# Patient Record
Sex: FEMALE | Race: WHITE | URBAN - METROPOLITAN AREA
[De-identification: names, ages, dates, MRNs, and addresses within clinical notes are randomized per-mention and may not be internally consistent; named-entity substitution may affect disease eponyms.]

---

## 2017-01-09 ENCOUNTER — FOLLOW UP ESTABLISHED (OUTPATIENT)
Dept: URBAN - METROPOLITAN AREA CLINIC 10 | Facility: CLINIC | Age: 82
End: 2017-01-09
Payer: MEDICARE

## 2017-01-09 PROCEDURE — 92012 INTRM OPH EXAM EST PATIENT: CPT | Performed by: OPHTHALMOLOGY

## 2017-01-09 RX ORDER — DORZOLAMIDE HYDROCHLORIDE AND TIMOLOL MALEATE 20; 5 MG/ML; MG/ML
SOLUTION OPHTHALMIC
Qty: 3 | Refills: 1 | Status: INACTIVE
Start: 2017-01-09 | End: 2017-09-25

## 2017-01-09 RX ORDER — LATANOPROST 50 UG/ML
0.005 % SOLUTION OPHTHALMIC
Qty: 3 | Refills: 1 | Status: INACTIVE
Start: 2017-01-09 | End: 2017-09-25

## 2017-01-09 ASSESSMENT — INTRAOCULAR PRESSURE
OS: 12
OD: 12

## 2017-05-08 ENCOUNTER — FOLLOW UP ESTABLISHED (OUTPATIENT)
Dept: URBAN - METROPOLITAN AREA CLINIC 10 | Facility: CLINIC | Age: 82
End: 2017-05-08
Payer: MEDICARE

## 2017-05-08 PROCEDURE — 92083 EXTENDED VISUAL FIELD XM: CPT | Performed by: OPHTHALMOLOGY

## 2017-05-08 PROCEDURE — 92012 INTRM OPH EXAM EST PATIENT: CPT | Performed by: OPHTHALMOLOGY

## 2017-05-08 ASSESSMENT — INTRAOCULAR PRESSURE
OD: 12
OS: 12

## 2017-08-21 PROBLEM — H40.9 GLAUCOMA, UNSPECIFIED GLAUCOMA TYPE, UNSPECIFIED LATERALITY: Status: ACTIVE | Noted: 2017-08-21

## 2017-09-25 ENCOUNTER — FOLLOW UP ESTABLISHED (OUTPATIENT)
Dept: URBAN - METROPOLITAN AREA CLINIC 10 | Facility: CLINIC | Age: 82
End: 2017-09-25
Payer: MEDICARE

## 2017-09-25 PROCEDURE — 92012 INTRM OPH EXAM EST PATIENT: CPT | Performed by: OPHTHALMOLOGY

## 2017-09-25 PROCEDURE — 92133 CPTRZD OPH DX IMG PST SGM ON: CPT | Performed by: OPHTHALMOLOGY

## 2017-09-25 RX ORDER — LATANOPROST 50 UG/ML
0.005 % SOLUTION OPHTHALMIC
Qty: 3 | Refills: 1 | Status: INACTIVE
Start: 2017-09-25 | End: 2018-08-22

## 2017-09-25 RX ORDER — DORZOLAMIDE HYDROCHLORIDE AND TIMOLOL MALEATE 20; 5 MG/ML; MG/ML
SOLUTION/ DROPS OPHTHALMIC
Qty: 3 | Refills: 1 | Status: INACTIVE
Start: 2017-09-25 | End: 2018-03-15

## 2017-09-25 ASSESSMENT — INTRAOCULAR PRESSURE
OD: 13
OS: 12

## 2018-03-02 ENCOUNTER — FOLLOW UP ESTABLISHED (OUTPATIENT)
Dept: URBAN - METROPOLITAN AREA CLINIC 10 | Facility: CLINIC | Age: 83
End: 2018-03-02
Payer: MEDICARE

## 2018-03-02 DIAGNOSIS — H40.1133 PRIMARY OPEN-ANGLE GLAUCOMA, BILATERAL, SEVERE STAGE: Primary | ICD-10-CM

## 2018-03-02 PROCEDURE — 92250 FUNDUS PHOTOGRAPHY W/I&R: CPT | Performed by: OPTOMETRIST

## 2018-03-02 PROCEDURE — 92014 COMPRE OPH EXAM EST PT 1/>: CPT | Performed by: OPTOMETRIST

## 2018-03-02 RX ORDER — BRINZOLAMIDE 10 MG/ML
1 % SUSPENSION/ DROPS OPHTHALMIC
Qty: 1 | Refills: 3 | Status: INACTIVE
Start: 2018-03-02 | End: 2018-03-09

## 2018-03-02 RX ORDER — TIMOLOL MALEATE 5 MG/ML
0.5 % SOLUTION/ DROPS OPHTHALMIC
Qty: 1 | Refills: 3 | Status: INACTIVE
Start: 2018-03-02 | End: 2019-05-09

## 2018-03-02 ASSESSMENT — INTRAOCULAR PRESSURE
OD: 13
OS: 13

## 2018-03-02 ASSESSMENT — VISUAL ACUITY
OD: 20/20
OS: 20/25

## 2019-05-09 ENCOUNTER — FOLLOW UP ESTABLISHED (OUTPATIENT)
Dept: URBAN - METROPOLITAN AREA CLINIC 10 | Facility: CLINIC | Age: 84
End: 2019-05-09
Payer: MEDICARE

## 2019-05-09 DIAGNOSIS — H31.091 CHORIORETINAL SCARS, RIGHT EYE: ICD-10-CM

## 2019-05-09 PROCEDURE — 92083 EXTENDED VISUAL FIELD XM: CPT | Performed by: OPTOMETRIST

## 2019-05-09 PROCEDURE — 92133 CPTRZD OPH DX IMG PST SGM ON: CPT | Performed by: OPTOMETRIST

## 2019-05-09 PROCEDURE — 92014 COMPRE OPH EXAM EST PT 1/>: CPT | Performed by: OPTOMETRIST

## 2019-05-09 PROCEDURE — 92015 DETERMINE REFRACTIVE STATE: CPT | Performed by: OPTOMETRIST

## 2019-05-09 RX ORDER — LATANOPROST 50 UG/ML
0.005 % SOLUTION OPHTHALMIC
Qty: 3 | Refills: 4 | Status: ACTIVE
Start: 2019-05-09

## 2019-05-09 RX ORDER — DORZOLAMIDE HYDROCHLORIDE AND TIMOLOL MALEATE 20; 5 MG/ML; MG/ML
SOLUTION/ DROPS OPHTHALMIC
Qty: 3 | Refills: 4 | Status: ACTIVE
Start: 2019-05-09

## 2019-05-09 RX ORDER — LATANOPROST 50 UG/ML
0.005 % SOLUTION OPHTHALMIC
Qty: 3 | Refills: 4 | Status: INACTIVE
Start: 2019-05-09 | End: 2019-05-09

## 2019-05-09 ASSESSMENT — KERATOMETRY
OD: 41.63
OS: 42.00

## 2019-05-09 ASSESSMENT — INTRAOCULAR PRESSURE
OS: 12
OD: 11

## 2019-05-09 ASSESSMENT — VISUAL ACUITY
OD: 20/20
OS: 20/25

## 2024-03-27 ENCOUNTER — ANESTHESIA EVENT (OUTPATIENT)
Dept: EMERGENCY DEPT | Facility: HOSPITAL | Age: 89
End: 2024-03-27
Payer: MEDICARE

## 2024-03-27 ENCOUNTER — APPOINTMENT (OUTPATIENT)
Dept: GENERAL RADIOLOGY | Facility: HOSPITAL | Age: 89
End: 2024-03-27
Attending: EMERGENCY MEDICINE
Payer: MEDICARE

## 2024-03-27 ENCOUNTER — ANESTHESIA (OUTPATIENT)
Dept: SURGERY | Facility: HOSPITAL | Age: 89
End: 2024-03-27
Payer: MEDICARE

## 2024-03-27 ENCOUNTER — ANESTHESIA EVENT (OUTPATIENT)
Dept: SURGERY | Facility: HOSPITAL | Age: 89
End: 2024-03-27
Payer: MEDICARE

## 2024-03-27 ENCOUNTER — ANESTHESIA (OUTPATIENT)
Dept: EMERGENCY DEPT | Facility: HOSPITAL | Age: 89
End: 2024-03-27
Payer: MEDICARE

## 2024-03-27 ENCOUNTER — APPOINTMENT (OUTPATIENT)
Dept: GENERAL RADIOLOGY | Facility: HOSPITAL | Age: 89
End: 2024-03-27
Attending: ORTHOPAEDIC SURGERY
Payer: MEDICARE

## 2024-03-27 ENCOUNTER — HOSPITAL ENCOUNTER (INPATIENT)
Facility: HOSPITAL | Age: 89
LOS: 8 days | Discharge: NURSING, REHAB OR LONG-TERM CARE | End: 2024-04-04
Attending: EMERGENCY MEDICINE | Admitting: STUDENT IN AN ORGANIZED HEALTH CARE EDUCATION/TRAINING PROGRAM
Payer: MEDICARE

## 2024-03-27 ENCOUNTER — APPOINTMENT (OUTPATIENT)
Dept: CT IMAGING | Facility: HOSPITAL | Age: 89
End: 2024-03-27
Attending: EMERGENCY MEDICINE
Payer: MEDICARE
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
## 2024-03-27 DIAGNOSIS — S72.141A CLOSED DISPLACED INTERTROCHANTERIC FRACTURE OF RIGHT FEMUR, INITIAL ENCOUNTER (HCC): Primary | ICD-10-CM

## 2024-03-27 PROBLEM — F03.90 DEMENTIA (HCC): Status: ACTIVE | Noted: 2024-03-27

## 2024-03-27 PROBLEM — I95.1 ORTHOSTATIC HYPOTENSION: Status: ACTIVE | Noted: 2024-03-27

## 2024-03-27 PROBLEM — S72.009A HIP FRACTURE (HCC): Status: ACTIVE | Noted: 2024-03-27

## 2024-03-27 PROBLEM — F32.A DEPRESSION: Status: ACTIVE | Noted: 2024-03-27

## 2024-03-27 LAB
ALBUMIN SERPL-MCNC: 3.2 G/DL (ref 3.4–5)
ALBUMIN/GLOB SERPL: 0.9 {RATIO} (ref 1–2)
ALP LIVER SERPL-CCNC: 69 U/L
ALT SERPL-CCNC: 20 U/L
ANION GAP SERPL CALC-SCNC: 5 MMOL/L (ref 0–18)
AST SERPL-CCNC: 12 U/L (ref 15–37)
BASOPHILS # BLD AUTO: 0.05 X10(3) UL (ref 0–0.2)
BASOPHILS NFR BLD AUTO: 0.5 %
BILIRUB SERPL-MCNC: 0.2 MG/DL (ref 0.1–2)
BILIRUB UR QL STRIP.AUTO: NEGATIVE
BUN BLD-MCNC: 21 MG/DL (ref 9–23)
CALCIUM BLD-MCNC: 9 MG/DL (ref 8.5–10.1)
CHLORIDE SERPL-SCNC: 107 MMOL/L (ref 98–112)
CO2 SERPL-SCNC: 28 MMOL/L (ref 21–32)
COLOR UR AUTO: YELLOW
CREAT BLD-MCNC: 1.28 MG/DL
EGFRCR SERPLBLD CKD-EPI 2021: 40 ML/MIN/1.73M2 (ref 60–?)
EOSINOPHIL # BLD AUTO: 0.69 X10(3) UL (ref 0–0.7)
EOSINOPHIL NFR BLD AUTO: 7.4 %
ERYTHROCYTE [DISTWIDTH] IN BLOOD BY AUTOMATED COUNT: 14.5 %
GLOBULIN PLAS-MCNC: 3.4 G/DL (ref 2.8–4.4)
GLUCOSE BLD-MCNC: 104 MG/DL (ref 70–99)
GLUCOSE UR STRIP.AUTO-MCNC: NORMAL MG/DL
HCT VFR BLD AUTO: 38 %
HGB BLD-MCNC: 12.1 G/DL
IMM GRANULOCYTES # BLD AUTO: 0.17 X10(3) UL (ref 0–1)
IMM GRANULOCYTES NFR BLD: 1.8 %
KETONES UR STRIP.AUTO-MCNC: NEGATIVE MG/DL
LEUKOCYTE ESTERASE UR QL STRIP.AUTO: 500
LYMPHOCYTES # BLD AUTO: 2.53 X10(3) UL (ref 1–4)
LYMPHOCYTES NFR BLD AUTO: 27.1 %
MCH RBC QN AUTO: 30.9 PG (ref 26–34)
MCHC RBC AUTO-ENTMCNC: 31.8 G/DL (ref 31–37)
MCV RBC AUTO: 97.2 FL
MONOCYTES # BLD AUTO: 0.71 X10(3) UL (ref 0.1–1)
MONOCYTES NFR BLD AUTO: 7.6 %
NEUTROPHILS # BLD AUTO: 5.2 X10 (3) UL (ref 1.5–7.7)
NEUTROPHILS # BLD AUTO: 5.2 X10(3) UL (ref 1.5–7.7)
NEUTROPHILS NFR BLD AUTO: 55.6 %
OSMOLALITY SERPL CALC.SUM OF ELEC: 293 MOSM/KG (ref 275–295)
PH UR STRIP.AUTO: 6.5 [PH] (ref 5–8)
PLATELET # BLD AUTO: 320 10(3)UL (ref 150–450)
POTASSIUM SERPL-SCNC: 4.2 MMOL/L (ref 3.5–5.1)
PROT SERPL-MCNC: 6.6 G/DL (ref 6.4–8.2)
PROT UR STRIP.AUTO-MCNC: 30 MG/DL
RBC # BLD AUTO: 3.91 X10(6)UL
RBC UR QL AUTO: NEGATIVE
SODIUM SERPL-SCNC: 140 MMOL/L (ref 136–145)
SP GR UR STRIP.AUTO: >1.03 (ref 1–1.03)
UROBILINOGEN UR STRIP.AUTO-MCNC: NORMAL MG/DL
WBC # BLD AUTO: 9.4 X10(3) UL (ref 4–11)

## 2024-03-27 PROCEDURE — 0QS636Z REPOSITION RIGHT UPPER FEMUR WITH INTRAMEDULLARY INTERNAL FIXATION DEVICE, PERCUTANEOUS APPROACH: ICD-10-PCS | Performed by: ORTHOPAEDIC SURGERY

## 2024-03-27 PROCEDURE — 72125 CT NECK SPINE W/O DYE: CPT | Performed by: EMERGENCY MEDICINE

## 2024-03-27 PROCEDURE — 73502 X-RAY EXAM HIP UNI 2-3 VIEWS: CPT | Performed by: EMERGENCY MEDICINE

## 2024-03-27 PROCEDURE — 76942 ECHO GUIDE FOR BIOPSY: CPT | Performed by: ANESTHESIOLOGY

## 2024-03-27 PROCEDURE — 3E0T3BZ INTRODUCTION OF ANESTHETIC AGENT INTO PERIPHERAL NERVES AND PLEXI, PERCUTANEOUS APPROACH: ICD-10-PCS | Performed by: ANESTHESIOLOGY

## 2024-03-27 PROCEDURE — 99223 1ST HOSP IP/OBS HIGH 75: CPT | Performed by: STUDENT IN AN ORGANIZED HEALTH CARE EDUCATION/TRAINING PROGRAM

## 2024-03-27 PROCEDURE — 76000 FLUOROSCOPY <1 HR PHYS/QHP: CPT | Performed by: ORTHOPAEDIC SURGERY

## 2024-03-27 PROCEDURE — 71045 X-RAY EXAM CHEST 1 VIEW: CPT | Performed by: EMERGENCY MEDICINE

## 2024-03-27 PROCEDURE — 70450 CT HEAD/BRAIN W/O DYE: CPT | Performed by: EMERGENCY MEDICINE

## 2024-03-27 PROCEDURE — 73501 X-RAY EXAM HIP UNI 1 VIEW: CPT | Performed by: ORTHOPAEDIC SURGERY

## 2024-03-27 DEVICE — ZNN CMN LAG SCREW 10.5X110
Type: IMPLANTABLE DEVICE | Site: HIP | Status: FUNCTIONAL
Brand: ZIMMER® NATURAL NAIL® SYSTEM

## 2024-03-27 DEVICE — IMPLANTABLE DEVICE
Type: IMPLANTABLE DEVICE | Site: HIP | Status: FUNCTIONAL
Brand: NATURAL NAIL®

## 2024-03-27 DEVICE — ZNN CMN NAIL 10MMX21.5CM 130R
Type: IMPLANTABLE DEVICE | Site: HIP | Status: FUNCTIONAL
Brand: ZIMMER® NATURAL NAIL® SYSTEM

## 2024-03-27 RX ORDER — ONDANSETRON 2 MG/ML
4 INJECTION INTRAMUSCULAR; INTRAVENOUS EVERY 6 HOURS PRN
Status: DISCONTINUED | OUTPATIENT
Start: 2024-03-27 | End: 2024-04-04

## 2024-03-27 RX ORDER — METOCLOPRAMIDE HYDROCHLORIDE 5 MG/ML
INJECTION INTRAMUSCULAR; INTRAVENOUS
Status: COMPLETED
Start: 2024-03-27 | End: 2024-03-27

## 2024-03-27 RX ORDER — DORZOLAMIDE HYDROCHLORIDE AND TIMOLOL MALEATE 20; 5 MG/ML; MG/ML
1 SOLUTION/ DROPS OPHTHALMIC
COMMUNITY
End: 2024-04-04

## 2024-03-27 RX ORDER — DEXAMETHASONE SODIUM PHOSPHATE 4 MG/ML
4 VIAL (ML) INJECTION ONCE
Status: COMPLETED | OUTPATIENT
Start: 2024-03-27 | End: 2024-03-27

## 2024-03-27 RX ORDER — HYDROMORPHONE HYDROCHLORIDE 1 MG/ML
0.2 INJECTION, SOLUTION INTRAMUSCULAR; INTRAVENOUS; SUBCUTANEOUS EVERY 5 MIN PRN
Status: DISCONTINUED | OUTPATIENT
Start: 2024-03-27 | End: 2024-03-28 | Stop reason: HOSPADM

## 2024-03-27 RX ORDER — HYDROMORPHONE HYDROCHLORIDE 1 MG/ML
0.4 INJECTION, SOLUTION INTRAMUSCULAR; INTRAVENOUS; SUBCUTANEOUS EVERY 5 MIN PRN
Status: DISPENSED | OUTPATIENT
Start: 2024-03-27 | End: 2024-03-27

## 2024-03-27 RX ORDER — MIDODRINE HYDROCHLORIDE 5 MG/1
5 TABLET ORAL 3 TIMES DAILY PRN
Status: DISCONTINUED | OUTPATIENT
Start: 2024-03-27 | End: 2024-03-29

## 2024-03-27 RX ORDER — ONDANSETRON 2 MG/ML
4 INJECTION INTRAMUSCULAR; INTRAVENOUS ONCE
Status: COMPLETED | OUTPATIENT
Start: 2024-03-27 | End: 2024-03-27

## 2024-03-27 RX ORDER — HYDROMORPHONE HYDROCHLORIDE 1 MG/ML
0.4 INJECTION, SOLUTION INTRAMUSCULAR; INTRAVENOUS; SUBCUTANEOUS EVERY 5 MIN PRN
Status: DISCONTINUED | OUTPATIENT
Start: 2024-03-27 | End: 2024-03-28 | Stop reason: HOSPADM

## 2024-03-27 RX ORDER — HYDROMORPHONE HYDROCHLORIDE 1 MG/ML
0.2 INJECTION, SOLUTION INTRAMUSCULAR; INTRAVENOUS; SUBCUTANEOUS EVERY 5 MIN PRN
Status: ACTIVE | OUTPATIENT
Start: 2024-03-27 | End: 2024-03-27

## 2024-03-27 RX ORDER — ATORVASTATIN CALCIUM 10 MG/1
1 TABLET, FILM COATED ORAL DAILY
COMMUNITY
Start: 2023-08-22

## 2024-03-27 RX ORDER — ONDANSETRON 2 MG/ML
4 INJECTION INTRAMUSCULAR; INTRAVENOUS EVERY 6 HOURS PRN
Status: DISCONTINUED | OUTPATIENT
Start: 2024-03-27 | End: 2024-03-28 | Stop reason: HOSPADM

## 2024-03-27 RX ORDER — ROCURONIUM BROMIDE 10 MG/ML
INJECTION, SOLUTION INTRAVENOUS AS NEEDED
Status: DISCONTINUED | OUTPATIENT
Start: 2024-03-27 | End: 2024-03-27 | Stop reason: SURG

## 2024-03-27 RX ORDER — ESCITALOPRAM OXALATE 10 MG/1
10 TABLET ORAL EVERY EVENING
Status: DISCONTINUED | OUTPATIENT
Start: 2024-03-27 | End: 2024-03-28

## 2024-03-27 RX ORDER — SODIUM CHLORIDE 9 MG/ML
INJECTION, SOLUTION INTRAVENOUS CONTINUOUS PRN
Status: DISCONTINUED | OUTPATIENT
Start: 2024-03-27 | End: 2024-03-27 | Stop reason: SURG

## 2024-03-27 RX ORDER — NALOXONE HYDROCHLORIDE 0.4 MG/ML
0.08 INJECTION, SOLUTION INTRAMUSCULAR; INTRAVENOUS; SUBCUTANEOUS AS NEEDED
Status: DISCONTINUED | OUTPATIENT
Start: 2024-03-27 | End: 2024-03-28 | Stop reason: HOSPADM

## 2024-03-27 RX ORDER — LIDOCAINE HYDROCHLORIDE 10 MG/ML
2 INJECTION, SOLUTION EPIDURAL; INFILTRATION; INTRACAUDAL; PERINEURAL AS NEEDED
Status: DISCONTINUED | OUTPATIENT
Start: 2024-03-27 | End: 2024-03-29 | Stop reason: ALTCHOICE

## 2024-03-27 RX ORDER — DEXAMETHASONE SODIUM PHOSPHATE 4 MG/ML
VIAL (ML) INJECTION AS NEEDED
Status: DISCONTINUED | OUTPATIENT
Start: 2024-03-27 | End: 2024-03-27

## 2024-03-27 RX ORDER — EPHEDRINE SULFATE 50 MG/ML
INJECTION INTRAVENOUS AS NEEDED
Status: DISCONTINUED | OUTPATIENT
Start: 2024-03-27 | End: 2024-03-27 | Stop reason: SURG

## 2024-03-27 RX ORDER — AMITRIPTYLINE HYDROCHLORIDE 25 MG/1
25 TABLET, FILM COATED ORAL NIGHTLY
Status: ON HOLD | COMMUNITY
Start: 2023-09-07 | End: 2024-03-27

## 2024-03-27 RX ORDER — MELATONIN
3 NIGHTLY PRN
Status: DISCONTINUED | OUTPATIENT
Start: 2024-03-27 | End: 2024-03-29

## 2024-03-27 RX ORDER — MIDODRINE HYDROCHLORIDE 5 MG/1
5 TABLET ORAL
COMMUNITY

## 2024-03-27 RX ORDER — HYDROMORPHONE HYDROCHLORIDE 1 MG/ML
0.4 INJECTION, SOLUTION INTRAMUSCULAR; INTRAVENOUS; SUBCUTANEOUS EVERY 2 HOUR PRN
Status: DISCONTINUED | OUTPATIENT
Start: 2024-03-27 | End: 2024-04-04

## 2024-03-27 RX ORDER — HYDROMORPHONE HYDROCHLORIDE 1 MG/ML
0.2 INJECTION, SOLUTION INTRAMUSCULAR; INTRAVENOUS; SUBCUTANEOUS EVERY 2 HOUR PRN
Status: DISCONTINUED | OUTPATIENT
Start: 2024-03-27 | End: 2024-04-04

## 2024-03-27 RX ORDER — DONEPEZIL HYDROCHLORIDE 5 MG/1
5 TABLET, FILM COATED ORAL NIGHTLY
COMMUNITY
Start: 2024-01-04 | End: 2025-01-03

## 2024-03-27 RX ORDER — SENNOSIDES 8.6 MG
17.2 TABLET ORAL NIGHTLY PRN
Status: DISCONTINUED | OUTPATIENT
Start: 2024-03-27 | End: 2024-04-04

## 2024-03-27 RX ORDER — SODIUM CHLORIDE, SODIUM LACTATE, POTASSIUM CHLORIDE, CALCIUM CHLORIDE 600; 310; 30; 20 MG/100ML; MG/100ML; MG/100ML; MG/100ML
INJECTION, SOLUTION INTRAVENOUS CONTINUOUS
Status: DISCONTINUED | OUTPATIENT
Start: 2024-03-27 | End: 2024-03-27

## 2024-03-27 RX ORDER — ECHINACEA PURPUREA EXTRACT 125 MG
1 TABLET ORAL
Status: DISCONTINUED | OUTPATIENT
Start: 2024-03-27 | End: 2024-04-04

## 2024-03-27 RX ORDER — DORZOLAMIDE HYDROCHLORIDE AND TIMOLOL MALEATE 20; 5 MG/ML; MG/ML
1 SOLUTION/ DROPS OPHTHALMIC 2 TIMES DAILY
Status: DISCONTINUED | OUTPATIENT
Start: 2024-03-27 | End: 2024-04-04

## 2024-03-27 RX ORDER — DEXAMETHASONE SODIUM PHOSPHATE 4 MG/ML
VIAL (ML) INJECTION AS NEEDED
Status: DISCONTINUED | OUTPATIENT
Start: 2024-03-27 | End: 2024-03-27 | Stop reason: SURG

## 2024-03-27 RX ORDER — ACETAMINOPHEN 10 MG/ML
1000 INJECTION, SOLUTION INTRAVENOUS ONCE
Status: COMPLETED | OUTPATIENT
Start: 2024-03-27 | End: 2024-03-27

## 2024-03-27 RX ORDER — POLYETHYLENE GLYCOL 3350 17 G/17G
17 POWDER, FOR SOLUTION ORAL DAILY PRN
Status: DISCONTINUED | OUTPATIENT
Start: 2024-03-27 | End: 2024-03-29

## 2024-03-27 RX ORDER — HYDROMORPHONE HYDROCHLORIDE 1 MG/ML
INJECTION, SOLUTION INTRAMUSCULAR; INTRAVENOUS; SUBCUTANEOUS
Status: COMPLETED
Start: 2024-03-27 | End: 2024-03-27

## 2024-03-27 RX ORDER — MEPERIDINE HYDROCHLORIDE 25 MG/ML
INJECTION INTRAMUSCULAR; INTRAVENOUS; SUBCUTANEOUS
Status: COMPLETED
Start: 2024-03-27 | End: 2024-03-27

## 2024-03-27 RX ORDER — BENZONATATE 100 MG/1
200 CAPSULE ORAL 3 TIMES DAILY PRN
Status: DISCONTINUED | OUTPATIENT
Start: 2024-03-27 | End: 2024-04-04

## 2024-03-27 RX ORDER — HYDROCODONE BITARTRATE AND ACETAMINOPHEN 5; 325 MG/1; MG/1
2 TABLET ORAL EVERY 4 HOURS PRN
Status: DISCONTINUED | OUTPATIENT
Start: 2024-03-27 | End: 2024-04-04

## 2024-03-27 RX ORDER — BISACODYL 10 MG
10 SUPPOSITORY, RECTAL RECTAL
Status: DISCONTINUED | OUTPATIENT
Start: 2024-03-27 | End: 2024-04-04

## 2024-03-27 RX ORDER — NALOXONE HYDROCHLORIDE 0.4 MG/ML
0.08 INJECTION, SOLUTION INTRAMUSCULAR; INTRAVENOUS; SUBCUTANEOUS AS NEEDED
Status: ACTIVE | OUTPATIENT
Start: 2024-03-27 | End: 2024-03-27

## 2024-03-27 RX ORDER — LIDOCAINE HYDROCHLORIDE 10 MG/ML
INJECTION, SOLUTION EPIDURAL; INFILTRATION; INTRACAUDAL; PERINEURAL AS NEEDED
Status: DISCONTINUED | OUTPATIENT
Start: 2024-03-27 | End: 2024-03-27 | Stop reason: SURG

## 2024-03-27 RX ORDER — SODIUM CHLORIDE, SODIUM LACTATE, POTASSIUM CHLORIDE, CALCIUM CHLORIDE 600; 310; 30; 20 MG/100ML; MG/100ML; MG/100ML; MG/100ML
INJECTION, SOLUTION INTRAVENOUS CONTINUOUS
Status: DISCONTINUED | OUTPATIENT
Start: 2024-03-27 | End: 2024-03-28 | Stop reason: HOSPADM

## 2024-03-27 RX ORDER — ONDANSETRON 2 MG/ML
INJECTION INTRAMUSCULAR; INTRAVENOUS AS NEEDED
Status: DISCONTINUED | OUTPATIENT
Start: 2024-03-27 | End: 2024-03-27 | Stop reason: SURG

## 2024-03-27 RX ORDER — LATANOPROST 50 UG/ML
1 SOLUTION/ DROPS OPHTHALMIC NIGHTLY
Status: DISCONTINUED | OUTPATIENT
Start: 2024-03-27 | End: 2024-04-04

## 2024-03-27 RX ORDER — DONEPEZIL HYDROCHLORIDE 5 MG/1
5 TABLET, FILM COATED ORAL NIGHTLY
Status: DISCONTINUED | OUTPATIENT
Start: 2024-03-27 | End: 2024-03-28

## 2024-03-27 RX ORDER — HYDROMORPHONE HYDROCHLORIDE 1 MG/ML
0.5 INJECTION, SOLUTION INTRAMUSCULAR; INTRAVENOUS; SUBCUTANEOUS ONCE
Status: COMPLETED | OUTPATIENT
Start: 2024-03-27 | End: 2024-03-27

## 2024-03-27 RX ORDER — HYDROMORPHONE HYDROCHLORIDE 1 MG/ML
0.6 INJECTION, SOLUTION INTRAMUSCULAR; INTRAVENOUS; SUBCUTANEOUS EVERY 5 MIN PRN
Status: DISCONTINUED | OUTPATIENT
Start: 2024-03-27 | End: 2024-03-28 | Stop reason: HOSPADM

## 2024-03-27 RX ORDER — ATORVASTATIN CALCIUM 10 MG/1
10 TABLET, FILM COATED ORAL NIGHTLY
Status: DISCONTINUED | OUTPATIENT
Start: 2024-03-27 | End: 2024-03-28 | Stop reason: SDUPTHER

## 2024-03-27 RX ORDER — AMITRIPTYLINE HYDROCHLORIDE 25 MG/1
25 TABLET, FILM COATED ORAL NIGHTLY
Status: DISCONTINUED | OUTPATIENT
Start: 2024-03-27 | End: 2024-04-04

## 2024-03-27 RX ORDER — ACETAMINOPHEN 10 MG/ML
INJECTION, SOLUTION INTRAVENOUS
Status: COMPLETED
Start: 2024-03-27 | End: 2024-03-27

## 2024-03-27 RX ORDER — LORATADINE 10 MG/1
10 CAPSULE, LIQUID FILLED ORAL
COMMUNITY

## 2024-03-27 RX ORDER — PHENYLEPHRINE HCL 10 MG/ML
VIAL (ML) INJECTION AS NEEDED
Status: DISCONTINUED | OUTPATIENT
Start: 2024-03-27 | End: 2024-03-27 | Stop reason: SURG

## 2024-03-27 RX ORDER — CITALOPRAM 20 MG/1
20 TABLET ORAL DAILY
COMMUNITY

## 2024-03-27 RX ORDER — SODIUM CHLORIDE, SODIUM LACTATE, POTASSIUM CHLORIDE, CALCIUM CHLORIDE 600; 310; 30; 20 MG/100ML; MG/100ML; MG/100ML; MG/100ML
INJECTION, SOLUTION INTRAVENOUS CONTINUOUS PRN
Status: DISCONTINUED | OUTPATIENT
Start: 2024-03-27 | End: 2024-03-27 | Stop reason: SURG

## 2024-03-27 RX ORDER — METOCLOPRAMIDE HYDROCHLORIDE 5 MG/ML
5 INJECTION INTRAMUSCULAR; INTRAVENOUS EVERY 8 HOURS PRN
Status: DISCONTINUED | OUTPATIENT
Start: 2024-03-27 | End: 2024-03-28

## 2024-03-27 RX ORDER — CEFAZOLIN SODIUM 1 G/3ML
INJECTION, POWDER, FOR SOLUTION INTRAMUSCULAR; INTRAVENOUS AS NEEDED
Status: DISCONTINUED | OUTPATIENT
Start: 2024-03-27 | End: 2024-03-27 | Stop reason: SURG

## 2024-03-27 RX ORDER — MAGNESIUM OXIDE 400 MG (241.3 MG MAGNESIUM) TABLET
1.5 TABLET DAILY
COMMUNITY

## 2024-03-27 RX ORDER — ENOXAPARIN SODIUM 100 MG/ML
30 INJECTION SUBCUTANEOUS DAILY
Status: DISCONTINUED | OUTPATIENT
Start: 2024-03-28 | End: 2024-03-28

## 2024-03-27 RX ORDER — ORPHENADRINE CITRATE 30 MG/ML
30 INJECTION INTRAMUSCULAR; INTRAVENOUS EVERY 12 HOURS
Status: DISCONTINUED | OUTPATIENT
Start: 2024-03-27 | End: 2024-03-29

## 2024-03-27 RX ORDER — ACETAMINOPHEN 325 MG/1
650 TABLET ORAL EVERY 4 HOURS PRN
Status: DISCONTINUED | OUTPATIENT
Start: 2024-03-27 | End: 2024-04-04

## 2024-03-27 RX ORDER — SODIUM CHLORIDE 9 MG/ML
10 INJECTION, SOLUTION INTRAMUSCULAR; INTRAVENOUS; SUBCUTANEOUS AS NEEDED
Status: DISCONTINUED | OUTPATIENT
Start: 2024-03-27 | End: 2024-03-29 | Stop reason: ALTCHOICE

## 2024-03-27 RX ORDER — METOCLOPRAMIDE HYDROCHLORIDE 5 MG/ML
5 INJECTION INTRAMUSCULAR; INTRAVENOUS EVERY 8 HOURS PRN
Status: DISCONTINUED | OUTPATIENT
Start: 2024-03-27 | End: 2024-03-28 | Stop reason: HOSPADM

## 2024-03-27 RX ORDER — HYDROMORPHONE HYDROCHLORIDE 1 MG/ML
0.6 INJECTION, SOLUTION INTRAMUSCULAR; INTRAVENOUS; SUBCUTANEOUS EVERY 5 MIN PRN
Status: ACTIVE | OUTPATIENT
Start: 2024-03-27 | End: 2024-03-27

## 2024-03-27 RX ORDER — MEPERIDINE HYDROCHLORIDE 25 MG/ML
12.5 INJECTION INTRAMUSCULAR; INTRAVENOUS; SUBCUTANEOUS AS NEEDED
Status: DISCONTINUED | OUTPATIENT
Start: 2024-03-27 | End: 2024-03-28 | Stop reason: HOSPADM

## 2024-03-27 RX ORDER — CARVEDILOL 3.12 MG/1
3.12 TABLET ORAL
COMMUNITY
End: 2024-04-04

## 2024-03-27 RX ORDER — ROPIVACAINE HYDROCHLORIDE 5 MG/ML
30 INJECTION, SOLUTION EPIDURAL; INFILTRATION; PERINEURAL AS NEEDED
Status: DISCONTINUED | OUTPATIENT
Start: 2024-03-27 | End: 2024-03-29 | Stop reason: ALTCHOICE

## 2024-03-27 RX ORDER — HYDROCODONE BITARTRATE AND ACETAMINOPHEN 5; 325 MG/1; MG/1
1 TABLET ORAL EVERY 4 HOURS PRN
Status: DISCONTINUED | OUTPATIENT
Start: 2024-03-27 | End: 2024-04-04

## 2024-03-27 RX ORDER — HYDROMORPHONE HYDROCHLORIDE 1 MG/ML
0.8 INJECTION, SOLUTION INTRAMUSCULAR; INTRAVENOUS; SUBCUTANEOUS EVERY 2 HOUR PRN
Status: DISCONTINUED | OUTPATIENT
Start: 2024-03-27 | End: 2024-04-04

## 2024-03-27 RX ORDER — MORPHINE SULFATE 4 MG/ML
4 INJECTION, SOLUTION INTRAMUSCULAR; INTRAVENOUS ONCE
Status: COMPLETED | OUTPATIENT
Start: 2024-03-27 | End: 2024-03-27

## 2024-03-27 RX ORDER — ALPRAZOLAM 0.25 MG/1
1 TABLET ORAL 3 TIMES DAILY PRN
COMMUNITY
Start: 2023-08-15 | End: 2024-04-04

## 2024-03-27 RX ADMIN — DEXAMETHASONE SODIUM PHOSPHATE 2 MG: 4 MG/ML VIAL (ML) INJECTION at 04:38:00

## 2024-03-27 RX ADMIN — ROCURONIUM BROMIDE 10 MG: 10 INJECTION, SOLUTION INTRAVENOUS at 21:05:00

## 2024-03-27 RX ADMIN — PHENYLEPHRINE HCL 100 MCG: 10 MG/ML VIAL (ML) INJECTION at 20:35:00

## 2024-03-27 RX ADMIN — EPHEDRINE SULFATE 10 MG: 50 INJECTION INTRAVENOUS at 21:21:00

## 2024-03-27 RX ADMIN — ONDANSETRON 4 MG: 2 INJECTION INTRAMUSCULAR; INTRAVENOUS at 21:21:00

## 2024-03-27 RX ADMIN — PHENYLEPHRINE HCL 100 MCG: 10 MG/ML VIAL (ML) INJECTION at 21:23:00

## 2024-03-27 RX ADMIN — DEXAMETHASONE SODIUM PHOSPHATE 4 MG: 4 MG/ML VIAL (ML) INJECTION at 20:43:00

## 2024-03-27 RX ADMIN — PHENYLEPHRINE HCL 100 MCG: 10 MG/ML VIAL (ML) INJECTION at 20:40:00

## 2024-03-27 RX ADMIN — SODIUM CHLORIDE: 9 INJECTION, SOLUTION INTRAVENOUS at 20:26:00

## 2024-03-27 RX ADMIN — ROCURONIUM BROMIDE 40 MG: 10 INJECTION, SOLUTION INTRAVENOUS at 20:23:00

## 2024-03-27 RX ADMIN — EPHEDRINE SULFATE 10 MG: 50 INJECTION INTRAVENOUS at 20:45:00

## 2024-03-27 RX ADMIN — CEFAZOLIN SODIUM 2 G: 1 INJECTION, POWDER, FOR SOLUTION INTRAMUSCULAR; INTRAVENOUS at 20:28:00

## 2024-03-27 RX ADMIN — PHENYLEPHRINE HCL 100 MCG: 10 MG/ML VIAL (ML) INJECTION at 20:32:00

## 2024-03-27 RX ADMIN — LIDOCAINE HYDROCHLORIDE 50 MG: 10 INJECTION, SOLUTION EPIDURAL; INFILTRATION; INTRACAUDAL; PERINEURAL at 20:22:00

## 2024-03-27 RX ADMIN — EPHEDRINE SULFATE 10 MG: 50 INJECTION INTRAVENOUS at 21:03:00

## 2024-03-27 RX ADMIN — SODIUM CHLORIDE, SODIUM LACTATE, POTASSIUM CHLORIDE, CALCIUM CHLORIDE: 600; 310; 30; 20 INJECTION, SOLUTION INTRAVENOUS at 20:18:00

## 2024-03-27 RX ADMIN — PHENYLEPHRINE HCL 100 MCG: 10 MG/ML VIAL (ML) INJECTION at 21:03:00

## 2024-03-27 NOTE — ED QUICK NOTES
Orders for admission, patient is aware of plan and ready to go upstairs. Any questions, please call ED RN Mary Lou at extension [phone].     Patient Covid vaccination status: Unvaccinated     COVID Test Ordered in ED: None    COVID Suspicion at Admission: N/A    Running Infusions:  None    Mental Status/LOC at time of transport: A&Ox4    Other pertinent information:   CIWA score: N/A   NIH score:  N/A

## 2024-03-27 NOTE — H&P
[facility]     Valeria Ramey Patient Status:  Emergency    1934 MRN [id number]   Location [facility] EMERGENCY DEPARTMENT Attending Mary Vela DO   Hosp Day # 0 PCP JOSIAH VASQUEZ MD     Chief Complaint: Mechanical fall    History of Present Illness: Valeria Ramey is a 89 year old female with past medical history of hypertension, depression, polyarthritis who presents for mechanical fall.    History obtained from daughter at bedside.  Patient notes that she was in normal state of health today when she was attempting to move around at home and assisted living and got tangled around her feet.  Fell on the right side, and had injury to the hip.  Daughter notes that she has been having multiple falls when she was living at home as well.  Patient currently denies any other nausea, vomiting, headaches, vision changes, abdominal pain, chest pain    Past Medical History:History reviewed. No pertinent past medical history.     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 She has never used smokeless tobacco. She reports current alcohol use. She reports that she does not use drugs.    Family History:   Family History   Problem Relation Age of Onset    Cancer Father 46        Hodgkin's Lymphoma    Other (Other) Mother         osteoporosis       Allergies: No Known Allergies    Medications:    No current facility-administered medications on file prior to encounter.     Current Outpatient Medications on File Prior to Encounter   Medication Sig Dispense Refill    ketoconazole 2 % External Cream Apply to ears BID as needed. 60 g 2    hydrocortisone 2.5 % External Cream Apply thin layer to AA of Lip 1-2  times daily x 1 week, hold, repeat PRN 30 g 0    hydrocortisone 2.5 % External Ointment Apply to AA BID for 2 weeks, then hold 2 weeks, repeat 30 g 2    clotrimazole-betamethasone 1-0.05 % External Cream Apply topically 2 (two) times daily.      Dorzolamide HCl-Timolol Mal 22.3-6.8 MG/ML Ophthalmic Solution 1 drop 2 (two) times daily.      latanoprost 0.005 % Ophthalmic Solution Place 1 drop into both eyes nightly.      aspirin 81 MG Oral Tab Take 81 mg by mouth daily.      Multiple Vitamin (MULTI-DAY OR) Take by mouth daily.      Omega-3 Fatty Acids (FISH OIL OR) Take by mouth daily.      TURMERIC OR Take by mouth 3 (three) times daily.      CRANBERRY CONCENTRATE OR Take by mouth daily.      Calcium Carbonate-Vitamin D (CALCIUM 600-D OR) Take by mouth 2 (two) times daily.         Review of Systems:   A comprehensive 14 point review of systems was completed.    Pertinent positives and negatives noted in the HPI.    Physical Exam:    /81   Pulse 72   Temp 97.5 °F (36.4 °C) (Oral)   Resp 10   Wt 150 lb (68 kg)   SpO2 99%   BMI 26.36 kg/m²   General: No acute distress. Alert and oriented x 3.  HEENT: Normocephalic atraumatic. Moist mucous membranes. EOM-I. PERRLA. Anicteric.  Neck: No lymphadenopathy. No JVD. No carotid bruits.  Respiratory: Clear to auscultation bilaterally. No wheezes. No rhonchi.  Cardiovascular: S1, S2. Regular rate and rhythm. No murmurs, rubs or gallops. Equal pulses.   Chest and Back: No tenderness or deformity.  Abdomen: Soft, nontender, nondistended.  Positive bowel sounds. No rebound, guarding or organomegaly.  Neurologic: No focal neurological deficits. CNII-XII grossly intact.  Musculoskeletal: Moves all extremities.  DP pulses intact bilaterally  Extremities: No edema or cyanosis.  Integument: No rashes or lesions.   Psychiatric: Appropriate mood and affect.    Diagnostic Data:      Labs:  Recent Labs   Lab 03/27/24  0212   WBC 9.4   HGB 12.1   MCV 97.2   .0       Recent  Labs   Lab 03/27/24  0211   *   BUN 21   CREATSERUM 1.28*   CA 9.0   ALB 3.2*      K 4.2      CO2 28.0   ALKPHO 69   AST 12*   ALT 20   BILT 0.2   TP 6.6       CrCl cannot be calculated (Unknown ideal weight.).    No results for input(s): \"PTP\", \"INR\" in the last 168 hours.    No results for input(s): \"TROP\", \"CK\" in the last 168 hours.    Imaging: Imaging data reviewed in Epic.  No results found.      ASSESSMENT / PLAN:     # Right hip fracture   - Primary management per orthopedic surgery   - s/p femoral block in ER    - PT/OT/SW following OR   - Pain control, bowel regimen    # Dementia - Donepizil  # Depression - Elavil, lexapro  # Orthostatic hypotension - midodrine PRN      Code Status: Not on file    Plan of care discussed with patient, ED physician    Wayne Seals MD  3/27/2024                Supplementary Documentation:      MDM : Patient's ER labs, imaging reviewed.  A.m. labs ordered.  ER management discussed with ED physician, decision made for patient to be admitted to the hospital for further medical management.

## 2024-03-27 NOTE — CONSULTS
ORTHO CONSULT NOTE    Patient Identification:        Name: Valeria Ramey  Age: 89 year old  Sex: female  :  1934  MRN: [id number]                                                  HPI:        Valeria Ramey is a 89 year old year old woman who presented to the ED after a fall onto the right hip.  She was unable to bear weight on the hip after the fall.  The patient developed immediate pain in the hip after the fall.  The patient was evaulated in the ER and was found to have a hip fracture. The patient was admitted to the hospitalist service and orthopaedics was consulted for management of the fracture. The patient currently complains of pain in the hip.  No numbness or tingling.  No chest pain or shortness of breath.      Problem List:  Patient Active Problem List   Diagnosis    Glaucoma, unspecified glaucoma type, unspecified laterality    Hip fracture (HCC)    Closed displaced intertrochanteric fracture of right femur (HCC)    Depression    Orthostatic hypotension    Dementia (HCC)    Closed displaced intertrochanteric fracture of right femur, initial encounter (HCC)    Fall, initial encounter         Past Medical History:  Past Medical History:   Diagnosis Date    Anxiety state     Cancer (HCC)     skin cancer on lip     Cataract     Depression     Glaucoma     High cholesterol     Osteoporosis     Polyarthritis        Past Surgical History:  Past Surgical History:   Procedure Laterality Date    APPENDECTOMY      CATARACT            x 8    OTHER SURGICAL HISTORY      s/p adrenal adenoma    SKIN SURGERY  2019    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   3/26/2024    dorzolamide-timolol 2-0.5 % Ophthalmic Solution Place 1 drop into both eyes 2 times daily after a meal.   3/26/2024    ALPRAZolam 0.25 MG  Oral Tab Take 1 tablet (0.25 mg total) by mouth 3 (three) times daily as needed for Sleep.       atorvastatin 10 MG Oral Tab Take 1 tablet (10 mg total) by mouth daily.   3/26/2024    carvedilol 3.125 MG Oral Tab Take 1 tablet (3.125 mg total) by mouth daily as needed (If systolic Blood pressure over 150).       donepezil 5 MG Oral Tab Take 1 tablet (5 mg total) by mouth at bedtime.   3/26/2024    Loratadine 10 MG Oral Cap Take 10 mg by mouth daily as needed.       melatonin 1 MG Oral Tab Take 1.5 tablets (1.5 mg total) by mouth daily.   3/26/2024    midodrine 5 MG Oral Tab Take 1 tablet (5 mg total) by mouth daily as needed (If systolic Blood pressure less than 120).       citalopram 20 MG Oral Tab Take 1 tablet (20 mg total) by mouth daily.   3/26/2024    latanoprost 0.005 % Ophthalmic Solution Place 1 drop into both eyes daily.   3/26/2024    aspirin 325 MG Oral Tab Take 1 tablet (325 mg total) by mouth daily.   3/26/2024    Calcium Carbonate-Vitamin D (CALCIUM 600-D OR) Take by mouth 2 (two) times daily.   3/26/2024       Current Meds:    [COMPLETED] morphINE PF 4 MG/ML injection 4 mg  4 mg Intravenous Once    [COMPLETED] ondansetron (Zofran) 4 MG/2ML injection 4 mg  4 mg Intravenous Once    [COMPLETED] HYDROmorphone (Dilaudid) 1 MG/ML injection 0.5 mg  0.5 mg Intravenous Once    orphenadrine citrate (Norflex) 30 mg/mL injection 30 mg  30 mg Intravenous Q12H    lidocaine PF (Xylocaine-MPF) 1% injection  2 mL Injection PRN    ropivacaine (Naropin) 0.5% injection  30 mL Injection PRN    EPINEPHrine (Adrenalin) 1 MG/ML injection (Allergic Reaction Kit) 1 mg  1 mg Injection PRN    sodium chloride 0.9% PF injection 10 mL  10 mL Injection PRN    [COMPLETED] dexamethasone (Decadron) 4 MG/ML injection 4 mg  4 mg Intravenous Once    lactated ringers infusion   Intravenous Continuous    lactated ringers IV bolus 500 mL  500 mL Intravenous Once PRN    [] atropine 0.1 MG/ML injection 0.5 mg  0.5 mg Intravenous PRN     [] naloxone (Narcan) 0.4 MG/ML injection 0.08 mg  0.08 mg Intravenous PRN    [] fentaNYL (Sublimaze) 50 mcg/mL injection 25 mcg  25 mcg Intravenous Q5 Min PRN    Or    [] fentaNYL (Sublimaze) 50 mcg/mL injection 50 mcg  50 mcg Intravenous Q5 Min PRN    [] HYDROmorphone (Dilaudid) 1 MG/ML injection 0.2 mg  0.2 mg Intravenous Q5 Min PRN    Or    [] HYDROmorphone (Dilaudid) 1 MG/ML injection 0.4 mg  0.4 mg Intravenous Q5 Min PRN    Or    [] HYDROmorphone (Dilaudid) 1 MG/ML injection 0.6 mg  0.6 mg Intravenous Q5 Min PRN    dorzolamide-timolol (Cosopt) 2-0.5 % ophthalmic solution 1 drop  1 drop Both Eyes BID    latanoprost (Xalatan) 0.005 % ophthalmic solution 1 drop  1 drop Both Eyes Nightly    amitriptyline (Elavil) tab 25 mg  25 mg Oral Nightly    atorvastatin (Lipitor) tab 10 mg  10 mg Oral Nightly    donepezil (Aricept) tab 5 mg  5 mg Oral Nightly    escitalopram (Lexapro) tab 10 mg  10 mg Oral QPM    midodrine (ProAmatine) tab 5 mg  5 mg Oral TID PRN    melatonin tab 3 mg  3 mg Oral Nightly PRN    polyethylene glycol (PEG 3350) (Miralax) 17 g oral packet 17 g  17 g Oral Daily PRN    sennosides (Senokot) tab 17.2 mg  17.2 mg Oral Nightly PRN    bisacodyl (Dulcolax) 10 MG rectal suppository 10 mg  10 mg Rectal Daily PRN    ondansetron (Zofran) 4 MG/2ML injection 4 mg  4 mg Intravenous Q6H PRN    metoclopramide (Reglan) 5 mg/mL injection 5 mg  5 mg Intravenous Q8H PRN    benzonatate (Tessalon) cap 200 mg  200 mg Oral TID PRN    guaiFENesin (Robitussin) 100 MG/5 ML oral liquid 200 mg  200 mg Oral Q4H PRN    glycerin-hypromellose- (Artifical Tears) 0.2-0.2-1 % ophthalmic solution 1 drop  1 drop Both Eyes QID PRN    sodium chloride (Saline Mist) 0.65 % nasal solution 1 spray  1 spray Each Nare Q3H PRN    [START ON 3/28/2024] enoxaparin (Lovenox) 30 MG/0.3ML SUBQ injection 30 mg  30 mg Subcutaneous Daily    acetaminophen (Tylenol) tab 650 mg  650 mg Oral Q4H PRN    Or     HYDROcodone-acetaminophen (Norco) 5-325 MG per tab 1 tablet  1 tablet Oral Q4H PRN    Or    HYDROcodone-acetaminophen (Norco) 5-325 MG per tab 2 tablet  2 tablet Oral Q4H PRN    HYDROmorphone (Dilaudid) 1 MG/ML injection 0.2 mg  0.2 mg Intravenous Q2H PRN    Or    HYDROmorphone (Dilaudid) 1 MG/ML injection 0.4 mg  0.4 mg Intravenous Q2H PRN    Or    HYDROmorphone (Dilaudid) 1 MG/ML injection 0.8 mg  0.8 mg Intravenous Q2H PRN    clonidine/epinephrine/ropivacaine/ketorolac in 0.9% NaCl 60 mL pain cocktail syringe for hip arthroplasty   Infiltration Once (Intra-Op)         Allergies:  Allergies   Allergen Reactions    Other SWELLING     Catgut sutures       Social History:   Social History     Tobacco Use    Smoking status: Never    Smokeless tobacco: Never   Substance Use Topics    Alcohol use: Yes     Comment: occ       Family History:  Family History   Problem Relation Age of Onset    Cancer Father 46        Hodgkin's Lymphoma    Other (Other) Mother         osteoporosis       Review of Systems:      Negative for fever, chills, nausea, vomiting, chest pain, shortness of breath, headache, dizziness, vision changes, or slurred speech.  All other pertinent positives and negatives as noted above in HPI.      Vitals:   Blood pressure 108/58, pulse 72, temperature 97.4 °F (36.3 °C), temperature source Oral, resp. rate 14, height 5' (1.524 m), weight 150 lb (68 kg), SpO2 96%.    I/O:     Intake/Output Summary (Last 24 hours) at 3/27/2024 1637  Last data filed at 3/27/2024 1555  Gross per 24 hour   Intake --   Output 50 ml   Net -50 ml       Physical Exam:        General - awake, alert, and oriented x 1    right lower extremity:  Extremity held in externally rotated position  Slightly shortened compared to contralateral extremity  Tender to palpation over greater trochanter  Able to dorsiflex ankle and move toes  Pain with any IR/ER of hip  Unable to test ROM due to pain  Sensation grossly intact to light touch  throughout  Distal pulses intact  No calf swelling  Matias's sign negative  Compartments soft  No signs or symptoms of DVT or compartment syndrome    Exam of the contralateral hip is normal:  No atrophy, erythema, ecchymosis, or gross deformity noted  No tenderness to palpation  Full active range of motion  5/5 strength in hip flexion, abduction, and adduction  Stinchfield and straight leg raise negative  GENE negative  Sensation grossly intact to light tough throughout  Skin and distal pulses intact      Labs:      Lab Results   Component Value Date    WBC 9.4 03/27/2024    HGB 12.1 03/27/2024    HCT 38.0 03/27/2024    .0 03/27/2024    CREATSERUM 1.28 03/27/2024    BUN 21 03/27/2024     03/27/2024    K 4.2 03/27/2024     03/27/2024    CO2 28.0 03/27/2024     03/27/2024    CA 9.0 03/27/2024    ALB 3.2 03/27/2024    ALKPHO 69 03/27/2024    BILT 0.2 03/27/2024    TP 6.6 03/27/2024    AST 12 03/27/2024    ALT 20 03/27/2024         Diagnostic Studies:      AP of the pelvis and lateral of the right hip were reviewed from the ED.  There is an intertrochanteric fracture of the hip with mild comminution at the fracture site.  The fracture is displaced. There are no periosteal reactions or medullary lesions seen.      Assessment:     right hip intertrochanteric fracture     Plan:      I have discussed the nature of intertrochanteric fractures with the patient and family.  This fracture will benefit from intramedullary fixation.  The surgical procedure will be scheduled once all medical and any necessary cardiac clearances have been obtained.    The risks, benefits, and alternatives of hip fracture surgery were discussed extensively.  The risks include but are not limited to infection, DVT, PE, bleeding and blood loss, damage to nerves or blood vessels, malunion or nonunion, dislocation, continued pain, hip stiffness/loss of motion, hardware irritation, and the possibility of future arthrosis of the  hip.  The risks of anesthesia including heart attack, stroke, and even death were discussed.  The typical post-operative course and rehab plan were discussed as well.  Activity restrictions following the surgery were emphasized and the patient expressed understanding.  All the patient's questions were answered.  A consent form was signed and placed on the chart.  The patient will receive perioperative antibiotics.  The patient will be placed on DVT prophylaxis after surgery.    Per the Secondary Fracture Prevention: Consensus Clinical Recommendations from a Multistakeholder Coalition, it was communicated that the patient's fracture likely means they have osteoporosis and are at high risk for breaking more bones, especially over the next 1 to 2 years. Additionally, it was expressed that their injury means they may suffer declines in mobility or independence--for example, have to use a walker, cane, or wheelchair, or move from their home to a residential facility, or stop participating in favorite activities (two thirds to one half do not regain prior ambulatory function with over 10% not being to ambulate at all). The odds of dying prematurely was again discussed. Emphasized the need to treat the scope of this problem through medical management.    Minimize use of medications associated w falls while in house and after discharge.    Patient will need close follow up w pcp and endocrinologist within a week of discharge given this fragility fracture. Patients who have had repair of a hip fracture or are hospitalized for a vertebral fracture can begin taking oral anti-osteoporosis pharmacotherapy in the hospital.    Initiate a daily supplement of at least 800 IU vitamin D per day for people aged 65 years or older with a hip or vertebral fracture. Initiate a daily calcium supplement for people aged 65 years or older with a hip or vertebral fracture who are unable to achieve an intake of 1200 mg/d of calcium from food  sources.      Admit to hospitalist, appreciate risk stratification and medical optimization  Discussed conservative vs operative treatment, patient would benefit from operative intervention  NWB RLE  Npo, hold anticoagulation for possible OR today   Plan for Right hip IMN   Discussed risks, benefits and alternatives to treatment  Pt marked, consented and all questions answered   Proceed with surgery as planned      Electronically Signed By: Ruben Marley MD, 3/27/2024, 4:37 PM      Ruben Marley MD  3/27/2024

## 2024-03-27 NOTE — ANESTHESIA POSTPROCEDURE EVALUATION
[facility]    Valeria Ramey Patient Status:  Emergency   Age/Gender 89 year old female MRN [id number]   Location [facility] EMERGENCY DEPARTMENT Attending Mary Vela DO   Hosp Day # 0 PCP JOSIAH VASQUEZ MD       Anesthesia Post-op Note        Procedure Summary       Date: 03/27/24 Room / Location:     Anesthesia Start: 0437 Anesthesia Stop: 0512    Procedure: ANESTHESIA NERVE BLOCK Diagnosis:     Scheduled Providers:  Anesthesiologist: Donovan Lockett MD    Anesthesia Type: regional ASA Status: 2 - Emergent            Anesthesia Type: regional    Vitals Value Taken Time   /87 03/27/24 0512   Temp  03/27/24 0512   Pulse 72 03/27/24 0512   Resp 25 03/27/24 0512   SpO2 96 % 03/27/24 0512   Vitals shown include unfiled device data.    Patient Location: Other: (ER pod A9)    Anesthesia Type: regional    Airway Patency: patent and extubated    Postop Pain Control: adequate    Mental Status: preanesthetic baseline    Nausea/Vomiting: none    Cardiopulmonary/Hydration status: stable euvolemic    Complications: no apparent anesthesia related complications    Postop vital signs: stable    Dental Exam: Unchanged from Preop    Patient to be discharged from PACU when criteria met.

## 2024-03-27 NOTE — CM/SW NOTE
03/27/24 1000   CM/SW Referral Data   Referral Source Social Work (self-referral)   Reason for Referral Discharge planning   Informant Daughter;Clinical Staff Member;EMR   Patient Info   Advanced directives? Yes   Patient's Current Mental Status at Time of Assessment Confused or unable to complete assessment   Patient's Home Environment Assisted Living   Post Acute Care Provider Upon Admission   ([facility])   Patient Status Prior to Admission   Independent with ADLs and Mobility No   Services in place prior to admission DME/Supplies at home   Type of DME/Supplies Rollator Walker   Discharge Needs   Anticipated D/C needs Subacute rehab;Transportation services       Patient is an 89 y/o woman with history of dementia currently admitted s/p fall with hip fracture.  Plan for OR later today.  Met with pt's dtr/JOSH Scott at bedside to discuss DC planning.  Pt resides at the [facility] in their assisted living.  Pt normally ambulates with a rollator walker.  Discussed anticipated need for AC at DC and pt's dtr in agreement.  Reviewed plan to send referrals post op to determine accepting facilities.  Reviewed Medicare coverage for AC including need for medically necessary 3 midnight inpatient hospital stay.  Pt was made inpatient status on 3/27 and would need to have a medical need to remain in the hospital until 3/30 in order to have Medicare coverage for AC.  If pt does not meet this criteria, pt could elect to pay privately for NH.    Patient's daughter shared concerns about family conflicts and family members who may create disruption during pt's hospital admission.  Pt's dtr Sonia is HCPOA.  There are seven other siblings: Bhanu ([place]), Rena ([facility]), Walt ([place]), Melinda ([place]), Xena (local), Michael ([place]), and Fernando (local).  Pt's dtr stated that she does not want information shared with Fernando or Melinda and expressed concern about Fernando possibly visiting and creating altercations/conflicts.   Encouraged her to create password with pt's RN.  Asked for HCPOA paperwork so that this can be added to pt's chart.  She sent paperwork via email.    Updated pt's RN and public safety regarding dtr's concerns.  Received HCPOA forms and will add to pt's chart.      Await post op MD and therapy recommendations for further DC Planning.  / to remain available for support and/or discharge planning.     Yovana Hardy, [facility]  Discharge Planner  [phone]

## 2024-03-27 NOTE — PROGRESS NOTES
Ortho Brief Note    Called regarding patient with right intertrochanteric femur fracture. Images reviewed and confirm.       Admit to hospitalist, appreciate risk stratification and medical optimization  Patient would benefit from operative intervention  NWB RLE  Npo, hold anticoagulation for possible OR today   Plan for right hip IMN  Will see and assess and discuss treatment plan.    Electronically Signed By: Ruben Marley MD, 3/27/2024, 8:05 AM

## 2024-03-27 NOTE — ED INITIAL ASSESSMENT (HPI)
Pt arrives via EMS from [facility] after an unwitnessed fall. Per report, pt was getting up to the bathroom, had a mechanical fall aroun 1 am. Pt hit the door with her head. Pt arrives with c-collar in placed.  Pt with c/o right hip pain.

## 2024-03-27 NOTE — ANESTHESIA PROCEDURE NOTES
Regional Block    Date/Time: 3/27/2024 4:37 AM    Performed by: Donovan Lockett MD  Authorized by: Donovan Lockett MD      General Information and Staff    Start Time:  3/27/2024 4:37 AM  End Time:  3/27/2024 4:55 AM  Anesthesiologist:  Donovan Lockett MD  Performed by:  Anesthesiologist  Patient Location:  ED      Site Identification: real time ultrasound guided and image stored and retrievable    Block site/laterality marked before start: site marked  Reason for Block: procedure for pain and at request of ED Physician    Preanesthetic Checklist: 2 patient identifers, IV checked, site marked, risks and benefits discussed, monitors and equipment checked, pre-op evaluation, timeout performed, anesthesia consent, sterile technique used, no prohibitive neurological deficits and no local skin infection at insertion site      Procedure Details    Patient Position:  Supine  Prep: ChloraPrep    Monitoring:  Cardiac monitor, continuous pulse ox and blood pressure cuff  Block Type:  Femoral  Laterality:  Right  Injection Technique:  Single-shot    Needle    Needle Type:  Short-bevel and echogenic  Needle Gauge:  20 G  Needle Length:  100 mm  Needle Localization:  Ultrasound guidance, nerve stimulator and anatomical landmarks  Reason for Ultrasound Use: appropriate spread of the medication was noted in real time and no ultrasound evidence of intravascular and/or intraneural injection    Nerve Stimulator: 0.5 amps  Muscle Twitch Response: quadriceps response      Assessment    Injection Assessment:  Good spread noted, negative aspiration for heme, negative resistance, no pain on injection, incremental injection, local visualized surrounding nerve on ultrasound and low pressure  Heart Rate Change: No    - Patient tolerated block procedure well without evidence of immediate block related complications.     Medications  3/27/2024 4:37 AM      Additional Comments    Medication:  Ropivacaine 0.5% 30mL PLUS 2 MG DECADRON

## 2024-03-27 NOTE — ANESTHESIA PREPROCEDURE EVALUATION
PRE-OP EVALUATION    Patient Name: Valeria Ramey    Admit Diagnosis: No admission diagnoses are documented for this encounter.    Pre-op Diagnosis: * No surgery found *        Anesthesia Procedure: ANESTHESIA NERVE BLOCK    * Surgery not found *    Pre-op vitals reviewed.  Temp: 97.5 °F (36.4 °C)  Pulse: 72  Resp: 10  BP: 132/81  SpO2: 99 %  Body mass index is 26.36 kg/m².    Current medications reviewed.  Hospital Medications:   [COMPLETED] morphINE PF 4 MG/ML injection 4 mg  4 mg Intravenous Once    [COMPLETED] ondansetron (Zofran) 4 MG/2ML injection 4 mg  4 mg Intravenous Once    [COMPLETED] HYDROmorphone (Dilaudid) 1 MG/ML injection 0.5 mg  0.5 mg Intravenous Once    orphenadrine citrate (Norflex) 30 mg/mL injection 30 mg  30 mg Intravenous Q12H    lidocaine PF (Xylocaine-MPF) 1% injection  2 mL Injection PRN    ropivacaine (Naropin) 0.5% injection  30 mL Injection PRN    EPINEPHrine (Adrenalin) 1 MG/ML injection (Allergic Reaction Kit) 1 mg  1 mg Injection PRN    sodium chloride 0.9% PF injection 10 mL  10 mL Injection PRN       Outpatient Medications:   (Not in a hospital admission)      Allergies: Patient has no known allergies.      Anesthesia Evaluation    Patient summary reviewed.    Anesthetic Complications           GI/Hepatic/Renal    Negative GI/hepatic/renal ROS.                             Cardiovascular    Negative cardiovascular ROS.                                                   Endo/Other    Negative endo/other ROS.                              Pulmonary    Negative pulmonary ROS.                       Neuro/Psych    Negative neuro/psych ROS.                                  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  Lab Results   Component Value Date    WBC 9.4 03/27/2024    RBC 3.91 03/27/2024    HGB 12.1 03/27/2024    HCT 38.0 03/27/2024    MCV 97.2 03/27/2024    MCH 30.9 03/27/2024    MCHC 31.8 03/27/2024    RDW 14.5 03/27/2024    .0 03/27/2024     Lab Results   Component Value Date     03/27/2024    K 4.2 03/27/2024     03/27/2024    CO2 28.0 03/27/2024    BUN 21 03/27/2024    CREATSERUM 1.28 (H) 03/27/2024     (H) 03/27/2024    CA 9.0 03/27/2024            Airway      Mallampati: II  Mouth opening: 3 FB  TM distance: 4 - 6 cm  Neck ROM: full Cardiovascular    Cardiovascular exam normal.         Dental    Dentition appears grossly intact         Pulmonary    Pulmonary exam normal.                 Other findings              ASA: 2 and emergent  Plan: regional  NPO status verified and patient meets guidelines.          Plan/risks discussed with: patient          Right femoral nerve block in bedside      Present on Admission:  **None**

## 2024-03-27 NOTE — OPERATIVE REPORT
OPERATIVE REPORT    Facility:  [facility]    Patient Name:  Valeria Ramey    Age/Gender:  89 year old female  :  1934    MRN:  [id number]    Date of Operation:  3/27/2024    Preoperative Diagnosis:  RIGHT INTERTROCHANTERIC FEMUR FRACTURE    Postoperative Diagnosis:  RIGHT INTERTROCHANTERIC FEMUR FRACTURE    Procedure Performed:  RIGHT FEMUR INTRAMEDULLARY NAILING    Surgeon:  KM KIRBY M.D.    Assistant:  Gretta Pitts SA    Anesthesia:  GENERAL    Estimated Blood Loss:  100cc    Drain:  NONE    Complications:  NONE    Implants:   1.  Jayesh Natural short cephalomedullary nail, 130 degree, 10 mm x 21.5 cm   2.  Lag screw 10.5 mm x 110 mm    Indications for Procedure:  Valeria Ramey is a 89 year old female with a right intertrochanteric femur fracture.  Medical clearance was obtained and surgery was scheduled.    Description of Procedure:  The patient was taken to the operating room after the correct surgical site was marked in the preop holding area.  The patient was placed under anesthesia and placed in a supine position on the [facility] orthopaedic table.  Preoperative antibiotics were given.  All bony prominences were padded.  A reduction was obtained on the orthopaedic table verified by fluoroscopy.  The right hip was prepped and draped in usual sterile surgical fashion.  An incision was made proximal to the greater trochanter and a guidewire was used to locate the tip of the trochanter and advanced into the intramedullary canal and confirmed on fluoroscopy (AP and lateral views). The opening reamer was used to open the proximal femur. A short nail was selected given the relatively stable fracture pattern.  The nail was passed using imaging.  A guidepin was placed into the femoral head using the insertion handle guide and verified with flouroscopy.  Measurements were taken from the guidewire and a lag screw was placed after reaming. Position of the screw was verified on AP ans lateral views.   The locking screw was placed into the top of the nail. Next using the insertion guide from the handle the distal locking screw was placed through a lateral incision. The depth was measured form the drill guide and appropriate length screw placed and confirmed length and position with fluoroscopy. The insertion handle removed.  Fluoroscopy verified reduction and position of the implants.  Wounds were irrigated and closed with 0 vicryl, 2-0 vicryl and staples. No drain used.  The surgical assistant was present for the entire procedure and assisted with the approach, exposure, definitive surgery and closure.  The patient left the operating room in stable condition.  There were no complications.    mobilize with PT, WBAT on operative extremity with walker  pain controlled with meds  Lovenox for DVT prophylaxis for 2 weeks, then transition to ECASA 81mg BID for 4 weeks   24 hours abx    KM KIRBY M.D.

## 2024-03-27 NOTE — ED PROVIDER NOTES
Patient Seen in: [facility] Emergency Department      History     Chief Complaint   Patient presents with    Fall     Stated Complaint: Unwitnessed fall    Subjective:   HPI    Patient is an 89-year-old female presenting to the ED with right hip pain status post fall.  The history is obtained from patient who is a good historian.  The patient is awake, alert, and oriented x 3.  States that she typically ambulates with assistance of a cane or a walker.  She was tried to go to the bathroom in the middle of the night when she believes that she lost her footing due to her slippers which caused her to fall.  She denies any syncope or LOC.  She did strike her head.  No anticoagulation.  She has severe pain in her right hip that is constant, worse with any subtle movement, nonradiating, without identified alleviating factors.  No distal loss of sensation or weakness.  No previous hip injury or surgery.  Patient arrived with a cervical collar in place.    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 by MM    TONSILLECTOMY                  Social History     Socioeconomic History    Marital status:     Number of children: 8   Occupational History    Occupation: Homeworker   Tobacco Use    Smoking status: Never    Smokeless tobacco: Never   Vaping Use    Vaping Use: Never used   Substance and Sexual Activity    Alcohol use: Yes     Comment: occ    Drug use: No     Social Determinants of Health     Food Insecurity: No Food Insecurity (3/27/2024)    Food Insecurity     Food Insecurity: Never true   Transportation Needs:  No Transportation Needs (3/27/2024)    Transportation Needs     Lack of Transportation: No   Housing Stability: Low Risk  (3/27/2024)    Housing Stability     Housing Instability: No              Review of Systems    Positive for stated complaint: Unwitnessed fall  Other systems are as noted in HPI.  Constitutional and vital signs reviewed.      All other systems reviewed and negative except as noted above.    Physical Exam     ED Triage Vitals [03/27/24 0200]   /87   Pulse 63   Resp 18   Temp 97.5 °F (36.4 °C)   Temp src Oral   SpO2 96 %   O2 Device None (Room air)       Current:/65 (BP Location: Left arm)   Pulse 86   Temp 97.9 °F (36.6 °C) (Oral)   Resp 17   Ht 152.4 cm (5')   Wt 77.8 kg   SpO2 93%   BMI 33.50 kg/m²         Physical Exam  Vitals and nursing note reviewed.   Constitutional:       General: She is in acute distress.      Appearance: Normal appearance. She is well-developed. She is not ill-appearing or toxic-appearing.      Comments: Patient appears uncomfortable due to pain.   HENT:      Head: Normocephalic and atraumatic.      Right Ear: External ear normal.      Left Ear: External ear normal.      Mouth/Throat:      Mouth: Mucous membranes are moist.      Pharynx: Oropharynx is clear.   Eyes:      Conjunctiva/sclera: Conjunctivae normal.   Cardiovascular:      Rate and Rhythm: Normal rate and regular rhythm.      Pulses:           Dorsalis pedis pulses are 2+ on the right side.        Posterior tibial pulses are detected w/ Doppler on the right side.      Heart sounds: Normal heart sounds.   Pulmonary:      Effort: Pulmonary effort is normal.      Breath sounds: Normal breath sounds.   Abdominal:      General: Abdomen is flat. Bowel sounds are normal. There is no distension.      Tenderness: There is no abdominal tenderness.   Musculoskeletal:         General: Deformity present.      Right lower leg: No edema.      Left lower leg: No edema.      Comments: Right lower extremity  is shortened and internally rotated.   Skin:     General: Skin is warm.      Capillary Refill: Capillary refill takes less than 2 seconds.      Findings: No rash.   Neurological:      General: No focal deficit present.      Mental Status: She is alert and oriented to person, place, and time.      GCS: GCS eye subscore is 4. GCS verbal subscore is 5. GCS motor subscore is 6.      Sensory: No sensory deficit.      Motor: No weakness.   Psychiatric:         Mood and Affect: Mood normal.         Behavior: Behavior normal.               ED Course     Labs Reviewed   COMP METABOLIC PANEL (14) - Abnormal; Notable for the following components:       Result Value    Glucose 104 (*)     Creatinine 1.28 (*)     eGFR-Cr 40 (*)     AST 12 (*)     Albumin 3.2 (*)     A/G Ratio 0.9 (*)     All other components within normal limits   URINALYSIS WITH CULTURE REFLEX - Abnormal; Notable for the following components:    Clarity Urine Turbid (*)     Spec Gravity >1.030 (*)     Protein Urine 30 (*)     Nitrite Urine 2+ (*)     Leukocyte Esterase Urine 500 (*)     WBC Urine 11-20 (*)     RBC Urine 3-5 (*)     Squamous Epi. Cells Few (*)     All other components within normal limits   COMP METABOLIC PANEL (14) - Abnormal; Notable for the following components:    Glucose 193 (*)     BUN 26 (*)     Creatinine 1.33 (*)     Calcium, Total 8.0 (*)     Calculated Osmolality 300 (*)     eGFR-Cr 38 (*)     Alkaline Phosphatase 52 (*)     Total Protein 5.5 (*)     Albumin 2.6 (*)     A/G Ratio 0.9 (*)     All other components within normal limits   PHOSPHORUS - Abnormal; Notable for the following components:    Phosphorus 5.0 (*)     All other components within normal limits   LACTIC ACID, PLASMA - Abnormal; Notable for the following components:    Lactic Acid 4.5 (*)     All other components within normal limits   PROTHROMBIN TIME (PT) - Abnormal; Notable for the following components:    PT 15.0 (*)     All other components within normal limits    LACTIC ACID REFLEX POST POSTIVE - Abnormal; Notable for the following components:    Lactic Acid 4.4 (*)     All other components within normal limits   LACTIC ACID REFLEX POST POSITIVE EXH8733 - Abnormal; Notable for the following components:    Lactic Acid 4.8 (*)     All other components within normal limits   HEMOGLOBIN - Abnormal; Notable for the following components:    HGB 10.9 (*)     All other components within normal limits   CBC, PLATELET; NO DIFFERENTIAL - Abnormal; Notable for the following components:    WBC 17.5 (*)     RBC 3.32 (*)     HGB 10.1 (*)     HCT 29.1 (*)     .0 (*)     All other components within normal limits   COMP METABOLIC PANEL (14) - Abnormal; Notable for the following components:    Glucose 106 (*)     BUN 28 (*)     Creatinine 1.07 (*)     Calcium, Total 7.7 (*)     eGFR-Cr 50 (*)     Alkaline Phosphatase 46 (*)     Total Protein 5.1 (*)     Albumin 2.4 (*)     Globulin  2.7 (*)     A/G Ratio 0.9 (*)     All other components within normal limits   CBC, PLATELET; NO DIFFERENTIAL - Abnormal; Notable for the following components:    WBC 14.9 (*)     RBC 3.17 (*)     HGB 9.5 (*)     HCT 30.5 (*)     .0 (*)     All other components within normal limits   COMP METABOLIC PANEL (14) - Abnormal; Notable for the following components:    CO2 19.0 (*)     Calcium, Total 7.9 (*)     Total Protein 5.2 (*)     Albumin 2.1 (*)     A/G Ratio 0.7 (*)     All other components within normal limits   POCT GLUCOSE - Abnormal; Notable for the following components:    POC Glucose 192 (*)     All other components within normal limits   URINE CULTURE, ROUTINE - Abnormal; Notable for the following components:    Urine Culture >100,000 CFU/ML Escherichia coli (*)     All other components within normal limits   CBC W/ DIFFERENTIAL - Abnormal; Notable for the following components:    WBC 23.4 (*)     RBC 2.44 (*)     HGB 7.5 (*)     HCT 24.1 (*)     Neutrophil Absolute Prelim 20.60 (*)      Neutrophil Absolute 20.60 (*)     All other components within normal limits   MAGNESIUM - Normal   PROCALCITONIN - Normal    Narrative:     Resulted by: batch: K, PBNP, PHOS, CO2, CL, NA, MG, ALB, TBIL, ALT, AST, ALP, CA, CREA, BUN, GLUC,    PRO BETA NATRIURETIC PEPTIDE - Normal   ED/MRSA SCREEN BY PCR-CC - Normal   CBC WITH DIFFERENTIAL WITH PLATELET    Narrative:     The following orders were created for panel order CBC With Differential With Platelet.  Procedure                               Abnormality         Status                     ---------                               -----------         ------                     CBC W/ DIFFERENTIAL[[id number]]                              Final result                 Please view results for these tests on the individual orders.   CBC WITH DIFFERENTIAL WITH PLATELET    Narrative:     The following orders were created for panel order CBC With Differential With Platelet.  Procedure                               Abnormality         Status                     ---------                               -----------         ------                     CBC W/ DIFFERENTIAL[[id number]]          Abnormal            Final result                 Please view results for these tests on the individual orders.   EMERGENCY RELEASE AND TRANSFUSION OF BLOOD PRODUCTS   TYPE AND SCREEN    Narrative:     The following orders were created for panel order Type and screen.  Procedure                               Abnormality         Status                     ---------                               -----------         ------                     ABORH (Blood Type)[[id number]]                               Final result               Antibody Screen[[id number]]                                  Final result                 Please view results for these tests on the individual orders.   ABORH (BLOOD TYPE)   ANTIBODY SCREEN   PREPARE RBC   RAINBOW DRAW LAVENDER   RAINBOW DRAW LIGHT GREEN   RAINBOW DRAW BLUE    BLOOD CULTURE   BLOOD CULTURE    Narrative:     Aerobic Bottle Volume - 1 mL    Comment -  Less than optimal blood volume collected, which may yield a false-negative result. Adequate volume is correlated with increased recovery rates. Lower volumes may adversely affect recovery and/or detection times. Clinical correlation is   recommended.   URINE CULTURE, ROUTINE   CBC W/ DIFFERENTIAL                      MDM      History obtained from patient and EMS.     Differential diagnosis includes intracranial hemorrhage, cervical spine fracture, hip fracture, pelvis fracture.    Previous records reviewed.  At office visit February 13, 2024 for hypertension as well as MDD and polyarthritis.    Testing considered and ordered includes chest x-ray as well as pelvis and right hip x-ray.  CT brain as well as CT cervical spine was ordered given associated head injury as well as severe pain with inability to clear her cervical spine by Nexus criteria.  Patient did arrive with cervical collar in place.    I reviewed all results.  CBC unremarkable.  CMP reviewed with slight elevation in creatinine and decrease in GFR when compared to previous test results from February 13, 2024 open the patient's BUN was 20 with a creatinine of 1.03 and GFR of 52.    I personally reviewed the radiographs and my individual interpretation shows right intertrochanteric hip fracture with displacement, comminuted  I also reviewed the official report which shows     CT HEAD WITHOUT IV CONTRAST  CT CERVICAL SPINE WITHOUT IV CONTRAST        IMPRESSION:  CT HEAD:  -No evidence of acute intracranial abnormality.  -No acute calvarial fracture.       CT CERVICAL SPINE:  -No evidence of acute fracture or malalignment of the cervical spine.  AP VIEW OF THE CHEST    IMPRESSION    COMPARISONS: None    The lung volumes with probable tortuous aorta.  Difficult to characterize aorta by chest x-ray.  Mild bibasilar atelectasis.  Vertebroplasties lower thoracic spine      No pneumothorax or pleural effusion.  No acute bony abnormality.      3 VIEWS OF THE RIGHT HIP/PELVIS    Mildly comminuted and moderately displaced right hip intertrochanteric fracture with varus angulation.  No definite dislocation.  No other fractures or diastasis.  Background of mild to moderate degenerative changes in the hips bilaterally.    Others who assisted in patient's care included hospitalist as well as orthopedic surgery, notified from ED    Interventions in care included IV pain management with morphine and subsequent Dilaudid.  Patient is also describing intense muscle spasm therefore muscle relaxant, Norflex, was also ordered.  Patient was given Zofran as antiemetic.  Given persistent pain with hip fracture, anesthesia was consulted for femoral nerve block.    I discussed all results with patient and daughter at bedside as well as need for hospitalization and surgical intervention.        Admission disposition: 3/27/2024  4:55 AM                                     Medical Decision Making      Disposition and Plan     Clinical Impression:  1. Closed displaced intertrochanteric fracture of right femur, initial encounter ([facility])    2. Fall, initial encounter         Disposition:  Admit  3/27/2024  4:55 am    Follow-up:  No follow-up provider specified.        Medications Prescribed:  Current Discharge Medication List                            Hospital Problems       Present on Admission  Date Reviewed: 10/12/2020            ICD-10-CM Noted POA    * (Principal) Closed displaced intertrochanteric fracture of right femur, initial encounter ([facility]) S72.141A 3/27/2024 Unknown    Closed displaced intertrochanteric fracture of right femur ([facility]) S72.141A 3/27/2024 Unknown    Dementia ([facility]) F03.90 3/27/2024 Unknown    Depression F32.A 3/27/2024 Unknown    Fall, initial encounter W19.XXXA 3/27/2024 Unknown    Hip fracture ([facility]) S72.009A 3/27/2024 Unknown    Orthostatic hypotension I95.1 3/27/2024 Unknown

## 2024-03-28 ENCOUNTER — APPOINTMENT (OUTPATIENT)
Dept: CV DIAGNOSTICS | Facility: HOSPITAL | Age: 89
End: 2024-03-28
Payer: MEDICARE

## 2024-03-28 ENCOUNTER — APPOINTMENT (OUTPATIENT)
Dept: GENERAL RADIOLOGY | Facility: HOSPITAL | Age: 89
End: 2024-03-28
Attending: INTERNAL MEDICINE
Payer: MEDICARE

## 2024-03-28 LAB
ALBUMIN SERPL-MCNC: 2.6 G/DL (ref 3.4–5)
ALBUMIN/GLOB SERPL: 0.9 {RATIO} (ref 1–2)
ALP LIVER SERPL-CCNC: 52 U/L
ALT SERPL-CCNC: 17 U/L
ANION GAP SERPL CALC-SCNC: 8 MMOL/L (ref 0–18)
ANTIBODY SCREEN: NEGATIVE
AST SERPL-CCNC: 16 U/L (ref 15–37)
BASOPHILS # BLD AUTO: 0.02 X10(3) UL (ref 0–0.2)
BASOPHILS NFR BLD AUTO: 0.1 %
BILIRUB SERPL-MCNC: 0.2 MG/DL (ref 0.1–2)
BUN BLD-MCNC: 26 MG/DL (ref 9–23)
CALCIUM BLD-MCNC: 8 MG/DL (ref 8.5–10.1)
CHLORIDE SERPL-SCNC: 108 MMOL/L (ref 98–112)
CO2 SERPL-SCNC: 24 MMOL/L (ref 21–32)
CREAT BLD-MCNC: 1.33 MG/DL
EGFRCR SERPLBLD CKD-EPI 2021: 38 ML/MIN/1.73M2 (ref 60–?)
EOSINOPHIL # BLD AUTO: 0.01 X10(3) UL (ref 0–0.7)
EOSINOPHIL NFR BLD AUTO: 0 %
ERYTHROCYTE [DISTWIDTH] IN BLOOD BY AUTOMATED COUNT: 14.3 %
GLOBULIN PLAS-MCNC: 2.9 G/DL (ref 2.8–4.4)
GLUCOSE BLD-MCNC: 192 MG/DL (ref 70–99)
GLUCOSE BLD-MCNC: 193 MG/DL (ref 70–99)
HCT VFR BLD AUTO: 24.1 %
HGB BLD-MCNC: 10.9 G/DL
HGB BLD-MCNC: 7.5 G/DL
IMM GRANULOCYTES # BLD AUTO: 0.36 X10(3) UL (ref 0–1)
IMM GRANULOCYTES NFR BLD: 1.5 %
INR BLD: 1.17 (ref 0.8–1.2)
LACTATE SERPL-SCNC: 4.4 MMOL/L (ref 0.4–2)
LACTATE SERPL-SCNC: 4.5 MMOL/L (ref 0.4–2)
LACTATE SERPL-SCNC: 4.8 MMOL/L (ref 0.4–2)
LYMPHOCYTES # BLD AUTO: 1.6 X10(3) UL (ref 1–4)
LYMPHOCYTES NFR BLD AUTO: 6.8 %
MAGNESIUM SERPL-MCNC: 2 MG/DL (ref 1.6–2.6)
MCH RBC QN AUTO: 30.7 PG (ref 26–34)
MCHC RBC AUTO-ENTMCNC: 31.1 G/DL (ref 31–37)
MCV RBC AUTO: 98.8 FL
MONOCYTES # BLD AUTO: 0.82 X10(3) UL (ref 0.1–1)
MONOCYTES NFR BLD AUTO: 3.5 %
MRSA DNA SPEC QL NAA+PROBE: NEGATIVE
NEUTROPHILS # BLD AUTO: 20.6 X10 (3) UL (ref 1.5–7.7)
NEUTROPHILS # BLD AUTO: 20.6 X10(3) UL (ref 1.5–7.7)
NEUTROPHILS NFR BLD AUTO: 88.1 %
NT-PROBNP SERPL-MCNC: 164 PG/ML (ref ?–450)
OSMOLALITY SERPL CALC.SUM OF ELEC: 300 MOSM/KG (ref 275–295)
PHOSPHATE SERPL-MCNC: 5 MG/DL (ref 2.5–4.9)
PLATELET # BLD AUTO: 215 10(3)UL (ref 150–450)
POTASSIUM SERPL-SCNC: 4.9 MMOL/L (ref 3.5–5.1)
PROCALCITONIN SERPL-MCNC: 0.06 NG/ML (ref ?–0.16)
PROT SERPL-MCNC: 5.5 G/DL (ref 6.4–8.2)
PROTHROMBIN TIME: 15 SECONDS (ref 11.6–14.8)
RBC # BLD AUTO: 2.44 X10(6)UL
RH BLOOD TYPE: POSITIVE
SODIUM SERPL-SCNC: 140 MMOL/L (ref 136–145)
WBC # BLD AUTO: 23.4 X10(3) UL (ref 4–11)

## 2024-03-28 PROCEDURE — 93306 TTE W/DOPPLER COMPLETE: CPT

## 2024-03-28 PROCEDURE — 99291 CRITICAL CARE FIRST HOUR: CPT

## 2024-03-28 PROCEDURE — 99291 CRITICAL CARE FIRST HOUR: CPT | Performed by: INTERNAL MEDICINE

## 2024-03-28 PROCEDURE — 71045 X-RAY EXAM CHEST 1 VIEW: CPT | Performed by: INTERNAL MEDICINE

## 2024-03-28 PROCEDURE — 99233 SBSQ HOSP IP/OBS HIGH 50: CPT | Performed by: INTERNAL MEDICINE

## 2024-03-28 PROCEDURE — 30233N1 TRANSFUSION OF NONAUTOLOGOUS RED BLOOD CELLS INTO PERIPHERAL VEIN, PERCUTANEOUS APPROACH: ICD-10-PCS | Performed by: INTERNAL MEDICINE

## 2024-03-28 RX ORDER — SODIUM CHLORIDE 9 MG/ML
INJECTION, SOLUTION INTRAVENOUS ONCE
Status: COMPLETED | OUTPATIENT
Start: 2024-03-28 | End: 2024-03-28

## 2024-03-28 RX ORDER — SENNOSIDES 8.6 MG
17.2 TABLET ORAL NIGHTLY
Status: DISCONTINUED | OUTPATIENT
Start: 2024-03-28 | End: 2024-04-04

## 2024-03-28 RX ORDER — DOXEPIN HYDROCHLORIDE 50 MG/1
1 CAPSULE ORAL DAILY
Status: DISCONTINUED | OUTPATIENT
Start: 2024-03-28 | End: 2024-04-04

## 2024-03-28 RX ORDER — ESCITALOPRAM OXALATE 10 MG/1
10 TABLET ORAL DAILY
Status: DISCONTINUED | OUTPATIENT
Start: 2024-03-28 | End: 2024-04-04

## 2024-03-28 RX ORDER — ENOXAPARIN SODIUM 100 MG/ML
30 INJECTION SUBCUTANEOUS DAILY
Status: DISCONTINUED | OUTPATIENT
Start: 2024-03-28 | End: 2024-04-01

## 2024-03-28 RX ORDER — ONDANSETRON 2 MG/ML
INJECTION INTRAMUSCULAR; INTRAVENOUS
Status: COMPLETED
Start: 2024-03-28 | End: 2024-03-28

## 2024-03-28 RX ORDER — HYDROMORPHONE HYDROCHLORIDE 1 MG/ML
INJECTION, SOLUTION INTRAMUSCULAR; INTRAVENOUS; SUBCUTANEOUS
Status: DISCONTINUED
Start: 2024-03-28 | End: 2024-03-28 | Stop reason: WASHOUT

## 2024-03-28 RX ORDER — CEFAZOLIN SODIUM/WATER 2 G/20 ML
2 SYRINGE (ML) INTRAVENOUS EVERY 8 HOURS
Status: DISCONTINUED | OUTPATIENT
Start: 2024-03-28 | End: 2024-03-28

## 2024-03-28 RX ORDER — MIDODRINE HYDROCHLORIDE 5 MG/1
5 TABLET ORAL 3 TIMES DAILY
Status: DISCONTINUED | OUTPATIENT
Start: 2024-03-28 | End: 2024-03-29

## 2024-03-28 RX ORDER — METOCLOPRAMIDE HYDROCHLORIDE 5 MG/ML
5 INJECTION INTRAMUSCULAR; INTRAVENOUS EVERY 8 HOURS PRN
Status: DISCONTINUED | OUTPATIENT
Start: 2024-03-28 | End: 2024-03-29

## 2024-03-28 RX ORDER — ONDANSETRON 2 MG/ML
4 INJECTION INTRAMUSCULAR; INTRAVENOUS ONCE
Status: COMPLETED | OUTPATIENT
Start: 2024-03-28 | End: 2024-03-28

## 2024-03-28 RX ORDER — ONDANSETRON 2 MG/ML
4 INJECTION INTRAMUSCULAR; INTRAVENOUS EVERY 6 HOURS PRN
Status: DISCONTINUED | OUTPATIENT
Start: 2024-03-28 | End: 2024-03-29 | Stop reason: ALTCHOICE

## 2024-03-28 RX ORDER — ATORVASTATIN CALCIUM 10 MG/1
10 TABLET, FILM COATED ORAL DAILY
Status: DISCONTINUED | OUTPATIENT
Start: 2024-03-28 | End: 2024-04-04

## 2024-03-28 NOTE — ANESTHESIA POSTPROCEDURE EVALUATION
[facility]    Valeria Ramey Patient Status:  Inpatient   Age/Gender 89 year old female MRN [id number]   Location [facility] SURGERY Attending Wayne Seals, *   Hosp Day # 0 PCP JOSIAH VASQUEZ MD       Anesthesia Post-op Note    RIGHT HIP/FEMUR INTRAMEDULLARY NAIL    Procedure Summary       Date: 03/27/24 Room / Location:  MAIN OR 12 /  MAIN OR    Anesthesia Start: 2016 Anesthesia Stop: 2143    Procedure: RIGHT HIP/FEMUR INTRAMEDULLARY NAIL (Right: Hip) Diagnosis:       Hip fracture (HCC)      (Hip fracture (HCC) [S72.009A])    Surgeons: Ruben Marley MD Anesthesiologist: Rocael Wahl MD    Anesthesia Type: general ASA Status: 3            Anesthesia Type: general    Vitals Value Taken Time   /73 03/27/24 2144   Temp 98.7 °F (37.1 °C) 03/27/24 2144   Pulse 91 03/27/24 2144   Resp 16 03/27/24 2144   SpO2 96 % 03/27/24 2144       Patient Location: PACU    Anesthesia Type: general    Airway Patency: patent and extubated    Postop Pain Control: adequate    Mental Status: mildly sedated but able to meaningfully participate in the post-anesthesia evaluation    Nausea/Vomiting: none    Cardiopulmonary/Hydration status: stable euvolemic    Complications: no apparent anesthesia related complications    Postop vital signs: stable    Dental Exam: Unchanged from Preop    Patient to be discharged from PACU when criteria met.

## 2024-03-28 NOTE — CONSULTS
ICU  Critical Care APRN Progress Note    NAME: Valeria Ramey - ROOM:  PACU/ PACU Pool - MRN: [id number] - Age: 89 year old - :1934    History Of Present Illness:  Valeria Ramey is a 89 year old female with PMHx significant for HTN, depression, polyarthritis who presented to the ED yesterday after a fall to the right hip. ED work up revealed hip fracture for which she was taken to the OR and is now POD #0 s/p right femur intramedullary nailing. Patient became hypotensive in PACU requiring initiation of norepinephrine and she was brought to the ICU for closer monitoring.    PMH:  Past Medical History:   Diagnosis Date    Anxiety state     Cancer (HCC)     skin cancer on lip 2019    Cataract     Depression     Glaucoma     High cholesterol     Osteoporosis     Polyarthritis        Social Hx:  Social History     Socioeconomic History    Marital status:     Number of children: 8   Occupational History    Occupation: Homeworker   Tobacco Use    Smoking status: Never    Smokeless tobacco: Never   Vaping Use    Vaping Use: Never used   Substance and Sexual Activity    Alcohol use: Yes     Comment: occ    Drug use: No     Social Determinants of Health     Food Insecurity: No Food Insecurity (3/27/2024)    Food Insecurity     Food Insecurity: Never true   Transportation Needs: No Transportation Needs (3/27/2024)    Transportation Needs     Lack of Transportation: No   Housing Stability: Low Risk  (3/27/2024)    Housing Stability     Housing Instability: No       Family Hx:  Family History   Problem Relation Age of Onset    Cancer Father 46        Hodgkin's Lymphoma    Other (Other) Mother         osteoporosis         Review of Systems:   A comprehensive 10 point review of systems was completed.  Pertinent positives and negatives noted in the HPI.    Current Facility-Administered Medications   Medication Dose Route Frequency    [MAR Hold] orphenadrine citrate (Norflex) 30 mg/mL injection 30 mg  30 mg  Intravenous Q12H    [MAR Hold] lidocaine PF (Xylocaine-MPF) 1% injection  2 mL Injection PRN    [MAR Hold] ropivacaine (Naropin) 0.5% injection  30 mL Injection PRN    [MAR Hold] EPINEPHrine (Adrenalin) 1 MG/ML injection (Allergic Reaction Kit) 1 mg  1 mg Injection PRN    [MAR Hold] sodium chloride 0.9% PF injection 10 mL  10 mL Injection PRN    lactated ringers infusion   Intravenous Continuous    [MAR Hold] dorzolamide-timolol (Cosopt) 2-0.5 % ophthalmic solution 1 drop  1 drop Both Eyes BID    [MAR Hold] latanoprost (Xalatan) 0.005 % ophthalmic solution 1 drop  1 drop Both Eyes Nightly    [MAR Hold] amitriptyline (Elavil) tab 25 mg  25 mg Oral Nightly    [MAR Hold] atorvastatin (Lipitor) tab 10 mg  10 mg Oral Nightly    [MAR Hold] donepezil (Aricept) tab 5 mg  5 mg Oral Nightly    [MAR Hold] escitalopram (Lexapro) tab 10 mg  10 mg Oral QPM    [MAR Hold] midodrine (ProAmatine) tab 5 mg  5 mg Oral TID PRN    [MAR Hold] melatonin tab 3 mg  3 mg Oral Nightly PRN    [MAR Hold] polyethylene glycol (PEG 3350) (Miralax) 17 g oral packet 17 g  17 g Oral Daily PRN    [MAR Hold] sennosides (Senokot) tab 17.2 mg  17.2 mg Oral Nightly PRN    [MAR Hold] bisacodyl (Dulcolax) 10 MG rectal suppository 10 mg  10 mg Rectal Daily PRN    [MAR Hold] ondansetron (Zofran) 4 MG/2ML injection 4 mg  4 mg Intravenous Q6H PRN    [MAR Hold] metoclopramide (Reglan) 5 mg/mL injection 5 mg  5 mg Intravenous Q8H PRN    [MAR Hold] benzonatate (Tessalon) cap 200 mg  200 mg Oral TID PRN    [MAR Hold] guaiFENesin (Robitussin) 100 MG/5 ML oral liquid 200 mg  200 mg Oral Q4H PRN    [MAR Hold] glycerin-hypromellose- (Artifical Tears) 0.2-0.2-1 % ophthalmic solution 1 drop  1 drop Both Eyes QID PRN    [MAR Hold] sodium chloride (Saline Mist) 0.65 % nasal solution 1 spray  1 spray Each Nare Q3H PRN    [MAR Hold] enoxaparin (Lovenox) 30 MG/0.3ML SUBQ injection 30 mg  30 mg Subcutaneous Daily    [MAR Hold] acetaminophen (Tylenol) tab 650 mg  650 mg  Oral Q4H PRN    Or    [MAR Hold] HYDROcodone-acetaminophen (Norco) 5-325 MG per tab 1 tablet  1 tablet Oral Q4H PRN    Or    [MAR Hold] HYDROcodone-acetaminophen (Norco) 5-325 MG per tab 2 tablet  2 tablet Oral Q4H PRN    [MAR Hold] HYDROmorphone (Dilaudid) 1 MG/ML injection 0.2 mg  0.2 mg Intravenous Q2H PRN    Or    [MAR Hold] HYDROmorphone (Dilaudid) 1 MG/ML injection 0.4 mg  0.4 mg Intravenous Q2H PRN    Or    [MAR Hold] HYDROmorphone (Dilaudid) 1 MG/ML injection 0.8 mg  0.8 mg Intravenous Q2H PRN    lactated ringers infusion   Intravenous Continuous    atropine 0.1 MG/ML injection 0.5 mg  0.5 mg Intravenous PRN    naloxone (Narcan) 0.4 MG/ML injection 0.08 mg  0.08 mg Intravenous PRN    fentaNYL (Sublimaze) 50 mcg/mL injection 25 mcg  25 mcg Intravenous Q5 Min PRN    Or    fentaNYL (Sublimaze) 50 mcg/mL injection 50 mcg  50 mcg Intravenous Q5 Min PRN    HYDROmorphone (Dilaudid) 1 MG/ML injection 0.2 mg  0.2 mg Intravenous Q5 Min PRN    Or    HYDROmorphone (Dilaudid) 1 MG/ML injection 0.4 mg  0.4 mg Intravenous Q5 Min PRN    Or    HYDROmorphone (Dilaudid) 1 MG/ML injection 0.6 mg  0.6 mg Intravenous Q5 Min PRN    ondansetron (Zofran) 4 MG/2ML injection 4 mg  4 mg Intravenous Q6H PRN    metoclopramide (Reglan) 5 mg/mL injection 5 mg  5 mg Intravenous Q8H PRN    meperidine (Demerol) 25 MG/ML injection 12.5 mg  12.5 mg Intravenous PRN    norepinephrine (Levophed) 4 mg/250mL infusion premix  0.5-8 mcg/min Intravenous Continuous       OBJECTIVE  Vitals:  BP (!) 82/50   Pulse 115   Temp 98.5 °F (36.9 °C)   Resp 24   Ht 152.4 cm (5')   Wt 150 lb (68 kg)   SpO2 98%   BMI 29.29 kg/m²                Physical Exam:    General Appearance: Alert, confused  Neck: No JVD, neck supple, no adenopathy, trachea midline, no carotid bruits  Lungs: Clear to auscultation bilaterally, respirations unlabored  Heart: Regular rate and rhythm, S1 and S2 normal, no murmur, rub or gallop  Abdomen: Soft, non-tender, bowel sounds active  all four quadrants, no masses, no organomegaly  Extremities: Extremities normal, atraumatic, no cyanosis or edema, capillary refill <3 sec.    Pulses: 2+ and symmetric all extremities  Skin: Pale, R hip dressing clean dry and intact    Data this admission:  XR HIP W OR WO PELVIS 1 VIEW, RIGHT (CPT=73501)    Result Date: 3/27/2024  CONCLUSION:  The patient is status post open reduction internal fixation of a right femur inter trochanteric fracture.  A intramedullary nail/right and compression screw noted.  Postsurgical changes are noted the soft tissues.   LOCATION:  [facility]   Dictated by (CST): Uriah Agosto MD on 3/27/2024 at 10:35 PM     Finalized by (CST): Uriah Agosto MD on 3/27/2024 at 10:36 PM       XR FLUOROSCOPY C-ARM TIME LESS THAN 1 HOUR (CPT=76000)    Result Date: 3/27/2024  CONCLUSION:  The patient is status post open reduction internal fixation of a right femoral inter trochanteric fracture.  Postsurgical changes are noted in the soft tissues.   LOCATION:  [facility]    Dictated by (CST): Uriah Agosto MD on 3/27/2024 at 9:48 PM     Finalized by (CST): Uriah Agosto MD on 3/27/2024 at 9:49 PM       CT SPINE CERVICAL (CPT=72125)    Result Date: 3/27/2024  CONCLUSION:  No evidence of acute fracture of the cervical spine.  There are moderate to severe multilevel degenerative changes present.  If there is persistent clinical concern then recommend follow-up imaging including MRI.  A preliminary report was provided by [facility].     LOCATION:  [facility]   Dictated by (CST): Matthew Urena MD on 3/27/2024 at 8:21 AM     Finalized by (CST): Matthew Urena MD on 3/27/2024 at 8:25 AM       XR CHEST AP PORTABLE  (CPT=71045)    Result Date: 3/27/2024  CONCLUSION:  1. Shallow inspiratory film without definite focal consolidation.  Limited due to technique.  Further evaluation can be obtained with CT of the chest as clinically indicated.  [facility] M.D. notified of these findings with [facility] radi[facility]  [facility]    LOCATION:  [facility]      Dictated by (CST): Eliana Chew MD on 3/27/2024 at 8:19 AM     Finalized by (CST): Eliana Chew MD on 3/27/2024 at 8:20 AM       XR HIP W OR WO PELVIS 2 OR 3 VIEWS, RIGHT (CPT=73502)    Result Date: 3/27/2024  CONCLUSION:  1. Comminuted displaced right intratrochanteric fracture.  ED M.D. notified of these findings with [facility]    LOCATION:  [facility]   Dictated by (CST): Eliana Chew MD on 3/27/2024 at 8:18 AM     Finalized by (CST): Eliana Chew MD on 3/27/2024 at 8:19 AM       CT BRAIN OR HEAD (22389)    Result Date: 3/27/2024  CONCLUSION:  No significant midline shift or mass effect.  No acute intracranial hemorrhage.  Moderate chronic microvascular ischemic changes are likely present within the white matter.  If there is persistent clinical concern then consider MRI.     LOCATION:  [facility]   Dictated by (CST): Matthew Urena MD on 3/27/2024 at 6:08 AM     Finalized by (CST): Matthew Urena MD on 3/27/2024 at 6:09 AM         Labs:  Lab Results   Component Value Date    WBC 9.4 03/27/2024    HGB 12.1 03/27/2024    HCT 38.0 03/27/2024    .0 03/27/2024    CREATSERUM 1.28 03/27/2024    BUN 21 03/27/2024     03/27/2024    K 4.2 03/27/2024     03/27/2024    CO2 28.0 03/27/2024     03/27/2024    CA 9.0 03/27/2024    ALB 3.2 03/27/2024    ALKPHO 69 03/27/2024    BILT 0.2 03/27/2024    TP 6.6 03/27/2024    AST 12 03/27/2024    ALT 20 03/27/2024           Assessment/Plan:  Shock, septic vs hemorrhagic  - Vasopressors to maintain MAP >65  - Resume home midodrine  - Lactic acid 4.5, trend for clearance  - Pct normal  - Order blood cultures  - TTE pending  - Zosyn (3/28- )    Right hip femur fracture POD #0 s/p Right femur intramedullary nailing  - EBL 100cc  - PT/OT, WBAT with walker  - Pain control    Acute blood loss anemia  - Reported EBL ~100cc  - No post-op labs available however labs upon arrival to the ICU  with Hgb 7.5, down from 12.1 with ongoing hypotension  - Will notify ortho surgeon  - Transfuse 2u PRBCs    UTI/leukocytosis  - Final cultures pending  - Afebrile  - Pct normal  - Will give additional IVF bolus now  - Zosyn (3/28- )    HTN  - Home meds on hold in setting of shock    Depression  - Home meds    F/E/N  - IVF  - Replete electrolytes per protocol  - NPO    Proph  - subcutaneous lovenox when ok for surgery- held for now due to hgb drop  - SCDs  - PT/OT    Dispo  - DNAR select (POLST form sent from nursing home)  - ICU monitoring    Plan of care discussed with intensivist on-call, Dr. Barrera    A total of 35 minutes of critical care time (exclusive of billable procedures) was administered. This involved direct patient intervention, complex decision making, and/or extensive discussions (>50% face to face time) with the patient, family, and clinical staff.    Yisel Cameron [facility]  Critical Care  [id number]      INTENSIVIST ADDENDUM      I agree with critical care APN note above. I have independently seen & evaluated the patient.  I agree with the management plan outlined above with the following changes/additions:      CXR 3/27      Plan  Hypotension post op. ?sepsis vs hypovolemia.   Continue with pressors as needed and IVF.   Echo pending.   Abx zosyn 3/28-  pending cx.   Possibly to floor , ortho wth tele if does not require pressors by end of today. Pulm will follow as needed out of ICU.     35 mins spent in critical care time, reviewing data, and discussion with family and treatment team.     On call Intensivist 03/28/24      My best regards,         Silvino Barrera MD  [facility] Pulmonary, Critical Care and Sleep Medicine

## 2024-03-28 NOTE — PROGRESS NOTES
Progress Note    Patient: Valeria Ramey  Medical record #: [id number]    Valeria Ramey is a 89 year old y/o female who is POD #1 IM nailing for right intertrochanteric femur fracture.  The patient's main complaint today is surgical pain at the operative site.  The pain is controlled.  The patient denies chest pain or shortness of breath. Transferred to ICU post op for hypotension.     Hospital Problem List:  Principal Problem:    Closed displaced intertrochanteric fracture of right femur, initial encounter ([facility])  Active Problems:    Hip fracture (HCC)    Closed displaced intertrochanteric fracture of right femur (HCC)    Depression    Orthostatic hypotension    Dementia ([facility])    Fall, initial encounter      Current Medications:   sodium chloride 0.9 % IV bolus 500 mL  500 mL Intravenous Once PRN    sennosides (Senokot) tab 17.2 mg  17.2 mg Oral Nightly    ondansetron (Zofran) 4 MG/2ML injection 4 mg  4 mg Intravenous Q6H PRN    metoclopramide (Reglan) 5 mg/mL injection 5 mg  5 mg Intravenous Q8H PRN    multivitamin (Tab-A-Carlos/Beta Carotene) tab 1 tablet  1 tablet Oral Daily    [Held by provider] enoxaparin (Lovenox) 30 MG/0.3ML SUBQ injection 30 mg  30 mg Subcutaneous Daily    [COMPLETED] nitroGLYCERIN (Nitrobid) 2 % ointment 1 inch  1 inch Topical Once    [COMPLETED] lactated ringers IV bolus 1,000 mL  1,000 mL Intravenous Once    [COMPLETED] sodium chloride 0.9% infusion   Intravenous Once    norepinephrine (Levophed) 4 mg/250mL infusion premix  0.5-30 mcg/min Intravenous Continuous    [COMPLETED] ondansetron (Zofran) 4 MG/2ML injection 4 mg  4 mg Intravenous Once    atorvastatin (Lipitor) tab 10 mg  10 mg Oral Daily    escitalopram (Lexapro) tab 10 mg  10 mg Oral Daily    midodrine (ProAmatine) tab 5 mg  5 mg Oral TID    cefTRIAXone (Rocephin) 1 g in D5W 100 mL IVPB-ADD  1 g Intravenous Q24H    [COMPLETED] sodium chloride 0.9 % IV bolus 500 mL  500 mL Intravenous Once    [COMPLETED] morphINE PF 4 MG/ML  injection 4 mg  4 mg Intravenous Once    [COMPLETED] ondansetron (Zofran) 4 MG/2ML injection 4 mg  4 mg Intravenous Once    [COMPLETED] HYDROmorphone (Dilaudid) 1 MG/ML injection 0.5 mg  0.5 mg Intravenous Once    orphenadrine citrate (Norflex) 30 mg/mL injection 30 mg  30 mg Intravenous Q12H    lidocaine PF (Xylocaine-MPF) 1% injection  2 mL Injection PRN    ropivacaine (Naropin) 0.5% injection  30 mL Injection PRN    EPINEPHrine (Adrenalin) 1 MG/ML injection (Allergic Reaction Kit) 1 mg  1 mg Injection PRN    sodium chloride 0.9% PF injection 10 mL  10 mL Injection PRN    [COMPLETED] dexamethasone (Decadron) 4 MG/ML injection 4 mg  4 mg Intravenous Once    [COMPLETED] lactated ringers IV bolus 500 mL  500 mL Intravenous Once PRN    [] atropine 0.1 MG/ML injection 0.5 mg  0.5 mg Intravenous PRN    [] naloxone (Narcan) 0.4 MG/ML injection 0.08 mg  0.08 mg Intravenous PRN    [] fentaNYL (Sublimaze) 50 mcg/mL injection 25 mcg  25 mcg Intravenous Q5 Min PRN    Or    [] fentaNYL (Sublimaze) 50 mcg/mL injection 50 mcg  50 mcg Intravenous Q5 Min PRN    [] HYDROmorphone (Dilaudid) 1 MG/ML injection 0.2 mg  0.2 mg Intravenous Q5 Min PRN    Or    [] HYDROmorphone (Dilaudid) 1 MG/ML injection 0.4 mg  0.4 mg Intravenous Q5 Min PRN    Or    [] HYDROmorphone (Dilaudid) 1 MG/ML injection 0.6 mg  0.6 mg Intravenous Q5 Min PRN    dorzolamide-timolol (Cosopt) 2-0.5 % ophthalmic solution 1 drop  1 drop Both Eyes BID    latanoprost (Xalatan) 0.005 % ophthalmic solution 1 drop  1 drop Both Eyes Nightly    amitriptyline (Elavil) tab 25 mg  25 mg Oral Nightly    midodrine (ProAmatine) tab 5 mg  5 mg Oral TID PRN    melatonin tab 3 mg  3 mg Oral Nightly PRN    polyethylene glycol (PEG 3350) (Miralax) 17 g oral packet 17 g  17 g Oral Daily PRN    sennosides (Senokot) tab 17.2 mg  17.2 mg Oral Nightly PRN    bisacodyl (Dulcolax) 10 MG rectal suppository 10 mg  10 mg Rectal Daily PRN     ondansetron (Zofran) 4 MG/2ML injection 4 mg  4 mg Intravenous Q6H PRN    benzonatate (Tessalon) cap 200 mg  200 mg Oral TID PRN    guaiFENesin (Robitussin) 100 MG/5 ML oral liquid 200 mg  200 mg Oral Q4H PRN    glycerin-hypromellose- (Artifical Tears) 0.2-0.2-1 % ophthalmic solution 1 drop  1 drop Both Eyes QID PRN    sodium chloride (Saline Mist) 0.65 % nasal solution 1 spray  1 spray Each Nare Q3H PRN    acetaminophen (Tylenol) tab 650 mg  650 mg Oral Q4H PRN    Or    HYDROcodone-acetaminophen (Norco) 5-325 MG per tab 1 tablet  1 tablet Oral Q4H PRN    Or    HYDROcodone-acetaminophen (Norco) 5-325 MG per tab 2 tablet  2 tablet Oral Q4H PRN    HYDROmorphone (Dilaudid) 1 MG/ML injection 0.2 mg  0.2 mg Intravenous Q2H PRN    Or    HYDROmorphone (Dilaudid) 1 MG/ML injection 0.4 mg  0.4 mg Intravenous Q2H PRN    Or    HYDROmorphone (Dilaudid) 1 MG/ML injection 0.8 mg  0.8 mg Intravenous Q2H PRN    [COMPLETED] clonidine/epinephrine/ropivacaine/ketorolac in 0.9% NaCl 60 mL pain cocktail syringe for hip arthroplasty   Infiltration Once (Intra-Op)    [] lactated ringers IV bolus 500 mL  500 mL Intravenous Once PRN    [COMPLETED] HYDROmorphone (Dilaudid) 1 MG/ML injection        [COMPLETED] metoclopramide (Reglan) 5 mg/mL injection        [COMPLETED] acetaminophen (Ofirmev) 10 mg/mL infusion premix 1,000 mg  1,000 mg Intravenous Once    [COMPLETED] acetaminophen (Ofirmev) 10 mg/mL infusion premix             Input/Output:    Intake/Output Summary (Last 24 hours) at 3/28/2024 1242  Last data filed at 3/28/2024 0956  Gross per 24 hour   Intake 4047.77 ml   Output 150 ml   Net 3897.77 ml       Vital signs:  Blood pressure (!) 124/93, pulse 76, temperature 97.3 °F (36.3 °C), resp. rate 21, height 5' (1.524 m), weight 171 lb 8.3 oz (77.8 kg), SpO2 92%.    Physical Exam:     right lower extremity:  Dressing: clean and dry  Able to dorsiflex ankle and move toes  Sensation grossly intact to light touch  throughout  Distal pulses intact  No calf swelling  Matias's sign negative  Compartments soft  No signs or symptoms of DVT or compartment syndrome  Leg lengths equal    Labs:   Lab Results   Component Value Date    WBC 23.4 03/28/2024    HGB 10.9 03/28/2024    HCT 24.1 03/28/2024    .0 03/28/2024    CREATSERUM 1.33 03/28/2024    BUN 26 03/28/2024     03/28/2024    K 4.9 03/28/2024     03/28/2024    CO2 24.0 03/28/2024     03/28/2024    CA 8.0 03/28/2024    ALB 2.6 03/28/2024    ALKPHO 52 03/28/2024    BILT 0.2 03/28/2024    TP 5.5 03/28/2024    AST 16 03/28/2024    ALT 17 03/28/2024    INR 1.17 03/28/2024    MG 2.0 03/28/2024    PHOS 5.0 03/28/2024         Assessment: 89 year old  female POD #1 IM nailing for right intertrochanteric femur fracture    Plan:    Hgb - 10.9 (after 2 units - prev 7.5)  - acute blood loss anemia, consistent with post op changes, stable/asymptomatic  Appreciate ICU/hospitalist assistance - defer transfer to floor to them, but ok from ortho standpoint   Mobilize with PT if stable, WBAT on operative extremity with walker  Pain controlled with meds  Typically recommend Lovenox for DVT prophylaxis for 2 weeks, then transition to ECASA 81mg BID daily 4 weeks  TEDs, SCDs, IS  Ice and elevation  Lester/sutures out in 14 days  Disposition: plan transfer to rehab when stable vs home with home health    Follow up with Dr. Marley - 2 weeks     Followed by [facility] for medical conditions to include Principal Problem:    Closed displaced intertrochanteric fracture of right femur, initial encounter ([facility])  Active Problems:    Hip fracture (HCC)    Closed displaced intertrochanteric fracture of right femur (HCC)    Depression    Orthostatic hypotension    Dementia (HCC)    Fall, initial encounter          Signed:  Ruben Marley MD  3/28/2024  12:42 PM

## 2024-03-28 NOTE — PROGRESS NOTES
Progress Note     Valeria Ramey Patient Status:  Inpatient    1934 MRN [id number]   Location [facility] 4SW-A Attending Kesha Torres,    Hosp Day # 1 PCP JOSIAH VASQUEZ MD     Chief Complaint: fall    Subjective:   S: Patient reports doing well, pain controlled.  2 dtrs at bedside report pt not eating/drinking very well prior to admission.   Off pressor but getting bolus IVF.      Review of Systems:   10 point ROS completed and was negative, except for pertinent positive and negatives stated in subjective.    Objective:   Vital signs:  Temp:  [97.3 °F (36.3 °C)-98.7 °F (37.1 °C)] 97.3 °F (36.3 °C)  Pulse:  [] 76  Resp:  [12-42] 21  BP: ()/() 124/93  SpO2:  [91 %-100 %] 92 %    Wt Readings from Last 3 Encounters:   24 171 lb 8.3 oz (77.8 kg)   10/12/20 135 lb (61.2 kg)   18 146 lb 12.8 oz (66.6 kg)       Intake/Output:    Intake/Output Summary (Last 24 hours) at 3/28/2024 1133  Last data filed at 3/28/2024 0956  Gross per 24 hour   Intake 4047.77 ml   Output 150 ml   Net 3897.77 ml         Physical Exam:    General: No acute distress. Alert , oriented to persons/self/events  Respiratory: Clear to auscultation bilaterally. No wheezes. No rhonchi.  On 2-3L NC  Cardiovascular: S1, S2. Regular rate and rhythm. 70-80s  Abdomen: Soft, nontender, nondistended.   Neurologic: No focal neurological deficits.   Musculoskeletal: R hip bandages present  Extremities: No edema.    Results:   Diagnostic Data:      Labs:    Selected labs - last 24 hours:  Endo  Lytes  Renal   Glu 193  Na 140 Ca 8.0  BUN 26   POC Gluc  192  K 4.9 PO4 5.0  Cr 1.33   A1c -  Cl 108 Mg 2.0  eGFR 38   TSH -  CO2 24.0         LFT  CBC  Other   AST 16  WBC 23.4  PTT - Procal 0.06   ALT 17  Hb 10.9  INR 1.17 CRP -   APk 52  Hct 24.1  Trop - D dim -   T nabil 0.2  .0  pBNP 164  BNP -  - Ferritin  -   Prot 5.5    CK  - Lactate  4.8   Alb 2.6    LDL  - COVID  -     Imaging: Imaging data reviewed in  Epic.    Medications:    sennosides  17.2 mg Oral Nightly    multivitamin  1 tablet Oral Daily    [Held by provider] enoxaparin  30 mg Subcutaneous Daily    atorvastatin  10 mg Oral Daily    escitalopram  10 mg Oral Daily    midodrine  5 mg Oral TID    cefTRIAXone  1 g Intravenous Q24H    sodium chloride  500 mL Intravenous Once    orphenadrine citrate  30 mg Intravenous Q12H    dorzolamide-timolol  1 drop Both Eyes BID    latanoprost  1 drop Both Eyes Nightly    amitriptyline  25 mg Oral Nightly       Assessment & Plan:   ASSESSMENT / PLAN:     # Right hip fracture 2/2 fall   - s/p intramedullary nailing   - s/p femoral block in ER    - PT/OT/SW    - Pain control, bowel regimen   - likely rehab at [facility]     # Hypotension/dehydration   - off pressor, on midodrine, given IVF    # Lactic acidosis  # Possible UTI   - given IVF boluses given   - f/u on urine cx, on abx IV    # Anemia, blood loss expected due to fx   - PRBCs given, repeat hgb >10, monitor    # Hypoxia, wean o2, IS  # Leukocytosis, reactive  # CKD 2-3, stable  # Dementia, Donepizil  # Depression, Elavil, lexapro     Quality:  DVT Mechanical Prophylaxis:   SCDs, Early ambuation  DVT Pharmacologic Prophylaxis   Medication    [Held by provider] enoxaparin (Lovenox) 30 MG/0.3ML SUBQ injection 30 mg              Code Status: DNAR/Selective Treatment  Kirk: External urinary catheter in place    Plan of care discussed with pt, RN.    ERIN Herron  11:48 AM      Supplementary Documentation:

## 2024-03-28 NOTE — ANESTHESIA PROCEDURE NOTES
Airway  Date/Time: 3/27/2024 8:24 PM  Urgency: elective    Difficult airway    General Information and Staff    Patient location during procedure: OR  Anesthesiologist: Rocael Wahl MD  Performed: anesthesiologist   Performed by: Rocael Wahl MD  Authorized by: Rocael Wahl MD      Indications and Patient Condition  Indications for airway management: anesthesia  Sedation level: deep  Preoxygenated: yes  Patient position: sniffing  Mask difficulty assessment: 1 - vent by mask    Final Airway Details  Final airway type: endotracheal airway      Successful airway: ETT  Cuffed: yes   Successful intubation technique: Video laryngoscopy  Endotracheal tube insertion site: oral  Blade: GlideScope  Blade size: #3  ETT size (mm): 7.5    Cormack-Lehane Classification: grade IIA - partial view of glottis  Placement verified by: capnometry   Measured from: lips  ETT to lips (cm): 19  Number of attempts at approach: 1

## 2024-03-28 NOTE — PHYSICAL THERAPY NOTE
PHYSICAL THERAPY EVALUATION - INPATIENT     Room Number: 457/457-A  Evaluation Date: 3/28/2024  Type of Evaluation: Initial  Physician Order: PT Eval and Treat    Presenting Problem: R femur fracture  Co-Morbidities : dementia  Reason for Therapy: Mobility Dysfunction and Discharge Planning    PHYSICAL THERAPY ASSESSMENT   Patient is currently functioning below baseline with bed mobility, transfers, and gait.  Prior to admission, patient's baseline is ambulating with RW.  Patient is requiring maximum assist as a result of the following impairments: decreased functional strength, decreased endurance/aerobic capacity, pain, and medical status.  Physical Therapy will continue to follow for duration of hospitalization.    Patient will benefit from continued skilled PT Services to promote return to prior level of function and safety with continuous assistance and gradual rehabilitative therapy .    PLAN  PT Treatment Plan: Bed mobility;Body mechanics;Endurance;Energy conservation;Patient education;Family education;Neuromuscular re-educate;Gait training;Range of motion;Stair training;Transfer training;Balance training  Rehab Potential : Good  Frequency (Obs): 3-5x/week  Number of Visits to Meet Established Goals: 10      CURRENT GOALS    Goal #1 Patient is able to demonstrate supine - sit EOB @ level: supervision     Goal #2 Patient is able to demonstrate transfers Sit to/from Stand at assistance level: supervision     Goal #3 Patient is able to ambulate 50 feet with assist device: walker - rolling at assistance level: supervision     Goal #4    Goal #5    Goal #6    Goal Comments: Goals established on 3/28/2024      PHYSICAL THERAPY MEDICAL/SOCIAL HISTORY  History related to current admission: Patient is a 89 year old female admitted on 3/27/2024 from home for fall.  Pt diagnosed with R femur fracture, s/p IM nailing 3/27/24.      HOME SITUATION  Type of Home: Assisted living facility   Home Layout: One level                 Lives With: Alone             Prior Level of [place]: Pt reports ambulating to dining room for meals with RW.  Pt is able to get self out of bed in the morning.    SUBJECTIVE  \"It hurts a little here.\"        OBJECTIVE  Precautions: Bed/chair alarm  Fall Risk: High fall risk    WEIGHT BEARING RESTRICTION  Weight Bearing Restriction: R lower extremity        R Lower Extremity: Weight Bearing as Tolerated       PAIN ASSESSMENT  Rating: Unable to rate  Location: R groin  Management Techniques: Activity promotion;Body mechanics;Relaxation;Repositioning    COGNITION  Following Commands:  follows one step commands without difficulty    RANGE OF MOTION AND STRENGTH ASSESSMENT  Upper extremity ROM and strength are within functional limits     Lower extremity ROM is within functional limits except R hip    Lower extremity strength is within functional limits except R hip- noted buckling when attempting to weight bear.      BALANCE  Static Sitting: Fair -  Dynamic Sitting: Poor +  Static Standing: Poor +  Dynamic Standing: Dependent    ADDITIONAL TESTS                                    ACTIVITY TOLERANCE                         O2 WALK       NEUROLOGICAL FINDINGS                        AM-PAC '6-Clicks' INPATIENT SHORT FORM - BASIC MOBILITY  How much difficulty does the patient currently have...  Patient Difficulty: Turning over in bed (including adjusting bedclothes, sheets and blankets)?: A Little   Patient Difficulty: Sitting down on and standing up from a chair with arms (e.g., wheelchair, bedside commode, etc.): A Lot   Patient Difficulty: Moving from lying on back to sitting on the side of the bed?: A Little   How much help from another person does the patient currently need...   Help from Another: Moving to and from a bed to a chair (including a wheelchair)?: A Lot   Help from Another: Need to walk in hospital room?: Total   Help from Another: Climbing 3-5 steps with a railing?: Total       AM-PAC  Score:  Raw Score: 12   Approx Degree of Impairment: 68.66%   Standardized Score (AM-PAC Scale): 35.33   CMS Modifier (G-Code): CL    FUNCTIONAL ABILITY STATUS  Gait Assessment        Skilled Therapy Provided     Bed Mobility:  Rolling: supervision  Supine to sit: mod A   Sit to supine: NT     Transfer Mobility:  Sit to stand: mod A to RW   Stand to sit: mod A from RW  Gait = Upon placing wt through RLE, noted immediate buckling.    Stand pivot transfer completed with max A x2 and buckling present.    Therapist's Comments: Pt encouraged to be OOB to chair.  RN aware of rec for 2 to pivot to/from bed.    Exercise/Education Provided:  Bed mobility  Body mechanics  Functional activity tolerated  Gait training  Transfer training    Patient End of Session: Up in chair;Needs met;Call light within reach;RN aware of session/findings;All patient questions and concerns addressed;Family present;Discussed recommendations with /      Patient Evaluation Complexity Level:  History Moderate - 1 or 2 personal factors and/or co-morbidities   Examination of body systems Moderate - addressing a total of 3 or more elements   Clinical Presentation Moderate - Evolving   Clinical Decision Making Moderate - Evolving       PT Session Time: 25 minutes    Therapeutic Activity: 10 minutes

## 2024-03-28 NOTE — CDS QUERY
CLINICAL DOCUMENTATION CLARIFICATION FORM    Dear ERIN Herron:  Please clarify the following regarding the documented diagnosis of Dementia -   Dementia Severity:      (  x ) Mild                              (   ) Severe      (   ) Moderate                    (  )  Other    _____________________________________________________________________  DOCUMENTATION FROM THE MEDICAL RECORD  H&P: right hip fracture; dementia; depression; orthostatic hypotension  3/28 pn: right hip fracture 2/2 fall; hypotension/dehydration; lactic acidosis possible uti; ; anemia - blood loss expected d/t fracture; hypoxia; leukocytosis; ckd-3; dementia; depression;    Treatment:  Donepizil          PLEASE DOCUMENT ANY ADDITIONAL DIAGNOSES AND/OR SPECIFICITY IN THE PROGRESS NOTES AND/OR DISCHARGE SUMMARY.  If you have any questions, please contact Clinical :  Ilana Cordero RN [phone]  Thank You  THIS FORM IS A PERMANENT PART OF THE MEDICAL RECORD

## 2024-03-28 NOTE — OCCUPATIONAL THERAPY NOTE
OCCUPATIONAL THERAPY EVALUATION - INPATIENT     Room Number: 457/457-A  Evaluation Date: 3/28/2024  Type of Evaluation: Initial  Presenting Problem: 3/27 R femur IM nailing    Physician Order: IP Consult to Occupational Therapy  Reason for Therapy: ADL/IADL Dysfunction and Discharge Planning    OCCUPATIONAL THERAPY ASSESSMENT   Patient is currently functioning below baseline with toileting, bathing, upper body dressing, lower body dressing, grooming, bed mobility, transfers, stating sitting balance, dynamic sitting balance, static standing balance, dynamic standing balance, maintaining seated position, and functional standing tolerance. Prior to admission, patient's baseline is Mod I for all ADLs except for min A for showering.  Patient is requiring maximum assist as a result of the following impairments: decreased functional strength, decreased functional reach, decreased endurance, pain, impaired standing balance, and impaired coordination. Occupational Therapy will continue to follow for duration of hospitalization.    Patient will benefit from continued skilled OT Services to promote return to prior level of function and safety with continuous assistance and gradual rehabilitative therapy       History Related to Current Admission: Patient is a 89 year old female admitted on 3/27/2024 with Presenting Problem: 3/27 R femur IM nailing. Co-Morbidities : dementia    WEIGHT BEARING RESTRICTION  Weight Bearing Restriction: R lower extremity        R Lower Extremity: Weight Bearing as Tolerated       Recommendations for nursing staff:   Transfers: Max A x2 or total lift  Toileting location: bed level or commode    EVALUATION SESSION:  Patient Start of Session: supine in bed for session  FUNCTIONAL TRANSFER ASSESSMENT  Sit to Stand: Edge of Bed  Edge of Bed: Maximum Assist (buckling og RLE)    BED MOBILITY  Rolling: Minimal Assist  Supine to Sit : Minimal Assist  Scooting: Min A to EOB    BALANCE ASSESSMENT  Static  Sitting: Contact Guard Assist  Sitting Bilateral: Minimal Assist  Static Standing: Maximum Assist  Standing Bilateral: Dependent (max A x2 to pivot to chair with stand pivot t/f 2/2 knee buckling)    FUNCTIONAL ADL ASSESSMENT  Grooming Seated: Supervision (to wash face)  LB Dressing Seated: Maximum Assist (for socks and brief over feet, taken off when sitting back down after pt max A to stand for safety)      ACTIVITY TOLERANCE: vitals stable                         O2 SATURATIONS       COGNITION  Overall Cognitive Status:  WFL - within functional limits    Upper Extremity   ROM: within functional limits   Strength: within functional limits   Coordination  Gross motor: WNL  Fine motor: WNL  Sensation: Light touch:  intact    EDUCATION PROVIDED  Patient: Role of Occupational Therapy; Plan of Care  Patient's Response to Education: Verbalized Understanding; Returned Demonstration    Equipment used: TBD  Demonstrates functional use, Would benefit from additional trial      Therapist comments: Pt reported fatigue at home, pt educated on work simplification and energy conservation for at home to assist with independence with fatigue at home.    Patient End of Session: Up in chair;Needs met;Call light within reach;All patient questions and concerns addressed;SCDs in place;Alarm set;Family present    OCCUPATIONAL PROFILE    HOME SITUATION  Type of Home: Assisted living facility  Home Layout: One level  Lives With: Alone    Toilet and Equipment: Comfort height toilet  Shower/Tub and Equipment: Walk-in shower       Occupation/Status: retired  Hand Dominance: Right          Prior Level of Function: Prior to admission, patient's baseline is Mod I for all ADLs except for min A for showering.    SUBJECTIVE   Pt stated, \"I am in pain here only when I move, no pain just laying here.\"    PAIN ASSESSMENT  Ratin  Location: no pain at this time when lying in bed       OBJECTIVE     Fall Risk: High fall  risk      ASSESSMENTS    AM-PAC ‘6-Clicks’ Inpatient Daily Activity Short Form  -   Putting on and taking off regular lower body clothing?: A Lot  -   Bathing (including washing, rinsing, drying)?: A Lot  -   Toileting, which includes using toilet, bedpan or urinal? : A Lot  -   Putting on and taking off regular upper body clothing?: A Little  -   Taking care of personal grooming such as brushing teeth?: A Little  -   Eating meals?: A Little    AM-PAC Score:  Score: 15  Approx Degree of Impairment: 56.46%  Standardized Score (AM-PAC Scale): 34.69    ADDITIONAL TESTS     NEUROLOGICAL FINDINGS      COGNITION ASSESSMENTS       PLAN  OT Treatment Plan: Balance activities;Energy conservation/work simplification techniques;IADL training;ADL training;Continued evaluation;Compensatory technique education;Equipment eval/education;Patient/Family training;Patient/Family education;Endurance training;UE strengthening/ROM;Functional transfer training  Rehab Potential : Good  Frequency: 3-5x/week  Number of Visits to Meet Established Goals: 5    ADL Goals   Patient will perform grooming: with setup  Patient will perform upper body dressing:  with supervision  Patient will perform lower body dressing:  with mod assist    Functional Transfer Goals  Patient will transfer from supine to sit:  with supervision  Patient will transfer from sit to stand:  with mod assist      Patient Evaluation Complexity Level:   Occupational Profile/Medical History MODERATE - Expanded review of history including review of medical or therapy record   Specific performance deficits impacting engagement in ADL/IADL MODERATE  3 - 5 performance deficits   Client Assessment/Performance Deficits MODERATE - Comorbidities and min to mod modifications of tasks    Clinical Decision Making MODERATE - Analysis of occupational profile, detailed assessments, several treatment options    Overall Complexity MODERATE     OT Session Time: 25 minutes  Self-Care Home  Management: 0 minutes  Therapeutic Activity: 15 minutes  Neuromuscular Re-education: 0 minutes  Therapeutic Exercise: 0 minutes  Cognitive Skills: 0 minutes  Sensory Integrative: 0 minutes  Orthotic Management and Trainin minutes  Can add/delete any of these

## 2024-03-28 NOTE — PLAN OF CARE
Assumed care of patient after shift report. ECHO done. Lactic acid 4.8, 500 ml bolus administered per MAR. Recheck hgb after blood transfusion 10.9. Up in chair x1 with PT/OT. Attempted to get into chair a 2nd time this afternoon, but patient felt too weak. Minimal report of pain today. PRN tylenol x2 and scheduled Norflex given. Diminished urine output, 1 occurrence in brief. Bladder scan showed 53 ml.

## 2024-03-28 NOTE — PROGRESS NOTES
[facility]   part of [facility]     Hospitalist Progress Note     Valeria Ramey Patient Status:  Inpatient    1934 MRN [id number]   Location [facility] 4SW-A Attending Kesha Torres,    Hosp Day # 1 PCP JOSIAH VASQUEZ MD     Chief Complaint: Fall    Subjective:     Patient feels okay today  Postoperatively blood pressure low and transferred to ICU    Objective:    Review of Systems:   A comprehensive review of systems was completed; pertinent positive and negatives stated in subjective.    Vital signs:  Temp:  [97.3 °F (36.3 °C)-98.7 °F (37.1 °C)] 97.3 °F (36.3 °C)  Pulse:  [] 76  Resp:  [12-42] 21  BP: ()/() 124/93  SpO2:  [91 %-100 %] 92 %    Physical Exam:    General: No acute distress  Respiratory: No wheezes, no rhonchi  Cardiovascular: S1, S2, regular rate and rhythm  Abdomen: Soft, Non-tender, non-distended, positive bowel sounds  Neuro: No new focal deficits.   Extremities: No edema      Diagnostic Data:    Labs:  Recent Labs   Lab 24  02124  02124  0226 24  0958   WBC 9.4 23.4*  --   --    HGB 12.1 7.5*  --  10.9*   MCV 97.2 98.8  --   --    .0 215.0  --   --    INR  --   --  1.17  --        Recent Labs   Lab 24   * 193*   BUN 21 26*   CREATSERUM 1.28* 1.33*   CA 9.0 8.0*   ALB 3.2* 2.6*    140   K 4.2 4.9    108   CO2 28.0 24.0   ALKPHO 69 52*   AST 12* 16   ALT 20 17   BILT 0.2 0.2   TP 6.6 5.5*       Estimated Creatinine Clearance: 20.6 mL/min (A) (based on SCr of 1.33 mg/dL (H)).    No results for input(s): \"TROP\", \"TROPHS\", \"CK\" in the last 168 hours.    Recent Labs   Lab 24  0226   PTP 15.0*   INR 1.17                  Microbiology    No results found for this visit on 24.      Imaging: Reviewed in Epic.    Medications:    sennosides  17.2 mg Oral Nightly    multivitamin  1 tablet Oral Daily    [Held by provider] enoxaparin  30 mg Subcutaneous Daily    atorvastatin   10 mg Oral Daily    escitalopram  10 mg Oral Daily    midodrine  5 mg Oral TID    cefTRIAXone  1 g Intravenous Q24H    orphenadrine citrate  30 mg Intravenous Q12H    dorzolamide-timolol  1 drop Both Eyes BID    latanoprost  1 drop Both Eyes Nightly    amitriptyline  25 mg Oral Nightly       Assessment & Plan:      #Right hip fracture s/p Right femur intramedullary nailing 3/27  -pain management per Ortho  -PT/OT    #Hemorrhagic Shock 2/2 blood loss +/- infectious process  -monitor for any ongoing bleeding  -PRBC transfusion support as needed  -Vasopressor supper per critical care  -Midodrine, taking PRN PTA  -Blood cultures infectious workup   -U/A with evidence of infection - Rocephin     #Acute Blood Loss Anemia  -venofer     #Hx of essential HTN  -hold Coreg, patient reports she was prescribed but never took     #Dementia - Donepizil  #Depression - Elavil, lexapro  #Orthostatic hypotension - midodrine PRN      Kesha Torres DO    Supplementary Documentation:     Quality:  DVT Mechanical Prophylaxis:   SCDs, Early ambuation  DVT Pharmacologic Prophylaxis   Medication    [Held by provider] enoxaparin (Lovenox) 30 MG/0.3ML SUBQ injection 30 mg                Code Status: DNAR/Selective Treatment  Kirk: External urinary catheter in place    At this point Ms. Ramey is expected to be discharge to: TBD    The 21st Century Cures Act makes medical notes like these available to patients in the interest of transparency. Please be advised this is a medical document. Medical documents are intended to carry relevant information, facts as evident, and the clinical opinion of the practitioner. The medical note is intended as peer to peer communication and may appear blunt or direct. It is written in medical language and may contain abbreviations or verbiage that are unfamiliar.             **Certification      PHYSICIAN Certification of Need for Inpatient Hospitalization - Initial Certification    Patient will require  inpatient services that will reasonably be expected to span two midnight's based on the clinical documentation in H+P.   Based on patients current state of illness, I anticipate that, after discharge, patient will require TBD.

## 2024-03-28 NOTE — CM/SW NOTE
Notified by PT that they anticipate need for gradual rehabilitative therapy at DC[facility]. [facility] is from [facility]. CM will continue to update facility, pt, and family with dc planning progress.     PASRR completed in preparation for potential DC to SNF.    CM/SW will remain available for DC planning and/or support.     ALY TorresN, CMSRN    [id number]

## 2024-03-28 NOTE — ANESTHESIA PREPROCEDURE EVALUATION
PRE-OP EVALUATION    Patient Name: Valeria Ramey    Admit Diagnosis: Fall, initial encounter [W19.XXXA]  Closed displaced intertrochanteric fracture of right femur, initial encounter (Formerly [facility]) [S72.141A]    Pre-op Diagnosis: Hip fracture (Formerly [facility]) [S72.009A]    RIGHT HIP/FEMUR INTRAMEDULLARY NAIL    Anesthesia Procedure: RIGHT HIP/FEMUR INTRAMEDULLARY NAIL (Right)    Surgeon(s) and Role:     * Ruben Marley MD - Primary    Pre-op vitals reviewed.  Temp: 97.4 °F (36.3 °C)  Pulse: 72  Resp: 14  BP: 108/58  SpO2: 96 %  Body mass index is 29.29 kg/m².    Current medications reviewed.  Hospital Medications:  • [COMPLETED] morphINE PF 4 MG/ML injection 4 mg  4 mg Intravenous Once   • [COMPLETED] ondansetron (Zofran) 4 MG/2ML injection 4 mg  4 mg Intravenous Once   • [COMPLETED] HYDROmorphone (Dilaudid) 1 MG/ML injection 0.5 mg  0.5 mg Intravenous Once   • [MAR Hold] orphenadrine citrate (Norflex) 30 mg/mL injection 30 mg  30 mg Intravenous Q12H   • [MAR Hold] lidocaine PF (Xylocaine-MPF) 1% injection  2 mL Injection PRN   • [MAR Hold] ropivacaine (Naropin) 0.5% injection  30 mL Injection PRN   • [MAR Hold] EPINEPHrine (Adrenalin) 1 MG/ML injection (Allergic Reaction Kit) 1 mg  1 mg Injection PRN   • [MAR Hold] sodium chloride 0.9% PF injection 10 mL  10 mL Injection PRN   • [COMPLETED] dexamethasone (Decadron) 4 MG/ML injection 4 mg  4 mg Intravenous Once   • lactated ringers infusion   Intravenous Continuous   • lactated ringers IV bolus 500 mL  500 mL Intravenous Once PRN   • [] atropine 0.1 MG/ML injection 0.5 mg  0.5 mg Intravenous PRN   • [] naloxone (Narcan) 0.4 MG/ML injection 0.08 mg  0.08 mg Intravenous PRN   • [] fentaNYL (Sublimaze) 50 mcg/mL injection 25 mcg  25 mcg Intravenous Q5 Min PRN    Or   • [] fentaNYL (Sublimaze) 50 mcg/mL injection 50 mcg  50 mcg Intravenous Q5 Min PRN   • [] HYDROmorphone (Dilaudid) 1 MG/ML injection 0.2 mg  0.2 mg Intravenous Q5 Min PRN    Or   •  [] HYDROmorphone (Dilaudid) 1 MG/ML injection 0.4 mg  0.4 mg Intravenous Q5 Min PRN    Or   • [] HYDROmorphone (Dilaudid) 1 MG/ML injection 0.6 mg  0.6 mg Intravenous Q5 Min PRN   • [MAR Hold] dorzolamide-timolol (Cosopt) 2-0.5 % ophthalmic solution 1 drop  1 drop Both Eyes BID   • [MAR Hold] latanoprost (Xalatan) 0.005 % ophthalmic solution 1 drop  1 drop Both Eyes Nightly   • [MAR Hold] amitriptyline (Elavil) tab 25 mg  25 mg Oral Nightly   • [MAR Hold] atorvastatin (Lipitor) tab 10 mg  10 mg Oral Nightly   • [MAR Hold] donepezil (Aricept) tab 5 mg  5 mg Oral Nightly   • [MAR Hold] escitalopram (Lexapro) tab 10 mg  10 mg Oral QPM   • [MAR Hold] midodrine (ProAmatine) tab 5 mg  5 mg Oral TID PRN   • [MAR Hold] melatonin tab 3 mg  3 mg Oral Nightly PRN   • [MAR Hold] polyethylene glycol (PEG 3350) (Miralax) 17 g oral packet 17 g  17 g Oral Daily PRN   • [MAR Hold] sennosides (Senokot) tab 17.2 mg  17.2 mg Oral Nightly PRN   • [MAR Hold] bisacodyl (Dulcolax) 10 MG rectal suppository 10 mg  10 mg Rectal Daily PRN   • [MAR Hold] ondansetron (Zofran) 4 MG/2ML injection 4 mg  4 mg Intravenous Q6H PRN   • [MAR Hold] metoclopramide (Reglan) 5 mg/mL injection 5 mg  5 mg Intravenous Q8H PRN   • [MAR Hold] benzonatate (Tessalon) cap 200 mg  200 mg Oral TID PRN   • [MAR Hold] guaiFENesin (Robitussin) 100 MG/5 ML oral liquid 200 mg  200 mg Oral Q4H PRN   • [MAR Hold] glycerin-hypromellose- (Artifical Tears) 0.2-0.2-1 % ophthalmic solution 1 drop  1 drop Both Eyes QID PRN   • [MAR Hold] sodium chloride (Saline Mist) 0.65 % nasal solution 1 spray  1 spray Each Nare Q3H PRN   • [MAR Hold] enoxaparin (Lovenox) 30 MG/0.3ML SUBQ injection 30 mg  30 mg Subcutaneous Daily   • [MAR Hold] acetaminophen (Tylenol) tab 650 mg  650 mg Oral Q4H PRN    Or   • [MAR Hold] HYDROcodone-acetaminophen (Norco) 5-325 MG per tab 1 tablet  1 tablet Oral Q4H PRN    Or   • [MAR Hold] HYDROcodone-acetaminophen (Norco) 5-325 MG per tab  2 tablet  2 tablet Oral Q4H PRN   • [MAR Hold] HYDROmorphone (Dilaudid) 1 MG/ML injection 0.2 mg  0.2 mg Intravenous Q2H PRN    Or   • [MAR Hold] HYDROmorphone (Dilaudid) 1 MG/ML injection 0.4 mg  0.4 mg Intravenous Q2H PRN    Or   • [MAR Hold] HYDROmorphone (Dilaudid) 1 MG/ML injection 0.8 mg  0.8 mg Intravenous Q2H PRN   • clonidine/epinephrine/ropivacaine/ketorolac in 0.9% NaCl 60 mL pain cocktail syringe for hip arthroplasty   Infiltration Once (Intra-Op)       Outpatient Medications:     Medications Prior to Admission   Medication Sig Dispense Refill Last Dose   • CRANBERRY EXTRACT OR Take 1 capsule by mouth 2 (two) times daily.   3/26/2024   • dorzolamide-timolol 2-0.5 % Ophthalmic Solution Place 1 drop into both eyes 2 times daily after a meal.   3/26/2024   • ALPRAZolam 0.25 MG Oral Tab Take 1 tablet (0.25 mg total) by mouth 3 (three) times daily as needed for Sleep.      • atorvastatin 10 MG Oral Tab Take 1 tablet (10 mg total) by mouth daily.   3/26/2024   • carvedilol 3.125 MG Oral Tab Take 1 tablet (3.125 mg total) by mouth daily as needed (If systolic Blood pressure over 150).      • donepezil 5 MG Oral Tab Take 1 tablet (5 mg total) by mouth at bedtime.   3/26/2024   • Loratadine 10 MG Oral Cap Take 10 mg by mouth daily as needed.      • melatonin 1 MG Oral Tab Take 1.5 tablets (1.5 mg total) by mouth daily.   3/26/2024   • midodrine 5 MG Oral Tab Take 1 tablet (5 mg total) by mouth daily as needed (If systolic Blood pressure less than 120).      • citalopram 20 MG Oral Tab Take 1 tablet (20 mg total) by mouth daily.   3/26/2024   • latanoprost 0.005 % Ophthalmic Solution Place 1 drop into both eyes daily.   3/26/2024   • aspirin 325 MG Oral Tab Take 1 tablet (325 mg total) by mouth daily.   3/26/2024   • Calcium Carbonate-Vitamin D (CALCIUM 600-D OR) Take by mouth 2 (two) times daily.   3/26/2024       Allergies: Other      Anesthesia Evaluation    Patient summary reviewed.    Anesthetic  Complications           GI/Hepatic/Renal                                 Cardiovascular                                                       Endo/Other                                  Pulmonary                           Neuro/Psych  Comment: dementia    (+) depression               (+) psychiatric history               Past Surgical History:   Procedure Laterality Date   • APPENDECTOMY     • CATARACT     •       x 8   • OTHER SURGICAL HISTORY      s/p adrenal adenoma   • SKIN SURGERY  2019    MMS of SCC to Left Upper Cutaneous Lip by MM   • TONSILLECTOMY       Social History     Socioeconomic History   • Marital status:    • Number of children: 8   Occupational History   • Occupation: Homeworker   Tobacco Use   • Smoking status: Never   • Smokeless tobacco: Never   Vaping Use   • Vaping Use: Never used   Substance and Sexual Activity   • Alcohol use: Yes     Comment: occ   • Drug use: No     History   Drug Use No     Available pre-op labs reviewed.  Lab Results   Component Value Date    WBC 9.4 2024    RBC 3.91 2024    HGB 12.1 2024    HCT 38.0 2024    MCV 97.2 2024    MCH 30.9 2024    MCHC 31.8 2024    RDW 14.5 2024    .0 2024     Lab Results   Component Value Date     2024    K 4.2 2024     2024    CO2 28.0 2024    BUN 21 2024    CREATSERUM 1.28 (H) 2024     (H) 2024    CA 9.0 2024            Airway      Mallampati: II  Mouth opening: >3 FB  TM distance: > 6 cm  Neck ROM: full Cardiovascular    Cardiovascular exam normal.  Rhythm: regular  Rate: normal     Dental             Pulmonary    Pulmonary exam normal.                 Other findings          ASA: 3   Plan: general  NPO status verified and patient meets guidelines.          Plan/risks discussed with: patient, significant other and child/children            Present on Admission:  **None**

## 2024-03-28 NOTE — PROGRESS NOTES
Called about patient. Reportedly hypotensive in the PACU requiring pressors. Sent to the ICU from the PACU. Per RN, thigh is soft. Dressings are clean and intact, minimal drainage on proximal dressing. Will order 2 units pRBCs stat.     Madelyn Ramirez MD

## 2024-03-29 LAB
ALBUMIN SERPL-MCNC: 2.4 G/DL (ref 3.4–5)
ALBUMIN/GLOB SERPL: 0.9 {RATIO} (ref 1–2)
ALP LIVER SERPL-CCNC: 46 U/L
ALT SERPL-CCNC: 15 U/L
ANION GAP SERPL CALC-SCNC: 4 MMOL/L (ref 0–18)
AST SERPL-CCNC: 23 U/L (ref 15–37)
BILIRUB SERPL-MCNC: 0.4 MG/DL (ref 0.1–2)
BLOOD TYPE BARCODE: 6200
BUN BLD-MCNC: 28 MG/DL (ref 9–23)
CALCIUM BLD-MCNC: 7.7 MG/DL (ref 8.5–10.1)
CHLORIDE SERPL-SCNC: 110 MMOL/L (ref 98–112)
CO2 SERPL-SCNC: 24 MMOL/L (ref 21–32)
CREAT BLD-MCNC: 1.07 MG/DL
EGFRCR SERPLBLD CKD-EPI 2021: 50 ML/MIN/1.73M2 (ref 60–?)
ERYTHROCYTE [DISTWIDTH] IN BLOOD BY AUTOMATED COUNT: 15.9 %
GLOBULIN PLAS-MCNC: 2.7 G/DL (ref 2.8–4.4)
GLUCOSE BLD-MCNC: 106 MG/DL (ref 70–99)
HCT VFR BLD AUTO: 29.1 %
HGB BLD-MCNC: 10.1 G/DL
MCH RBC QN AUTO: 30.4 PG (ref 26–34)
MCHC RBC AUTO-ENTMCNC: 34.7 G/DL (ref 31–37)
MCV RBC AUTO: 87.7 FL
OSMOLALITY SERPL CALC.SUM OF ELEC: 292 MOSM/KG (ref 275–295)
PLATELET # BLD AUTO: 149 10(3)UL (ref 150–450)
PLATELETS.RETICULATED NFR BLD AUTO: 1.8 % (ref 0–7)
POTASSIUM SERPL-SCNC: 4.5 MMOL/L (ref 3.5–5.1)
PROT SERPL-MCNC: 5.1 G/DL (ref 6.4–8.2)
RBC # BLD AUTO: 3.32 X10(6)UL
SODIUM SERPL-SCNC: 138 MMOL/L (ref 136–145)
UNIT VOLUME: 350 ML
WBC # BLD AUTO: 17.5 X10(3) UL (ref 4–11)

## 2024-03-29 PROCEDURE — 99232 SBSQ HOSP IP/OBS MODERATE 35: CPT | Performed by: INTERNAL MEDICINE

## 2024-03-29 RX ORDER — DONEPEZIL HYDROCHLORIDE 5 MG/1
5 TABLET, FILM COATED ORAL NIGHTLY
Status: DISCONTINUED | OUTPATIENT
Start: 2024-03-29 | End: 2024-04-04

## 2024-03-29 RX ORDER — MIDODRINE HYDROCHLORIDE 5 MG/1
5 TABLET ORAL 3 TIMES DAILY PRN
Status: DISCONTINUED | OUTPATIENT
Start: 2024-03-29 | End: 2024-04-04

## 2024-03-29 RX ORDER — POLYETHYLENE GLYCOL 3350 17 G/17G
17 POWDER, FOR SOLUTION ORAL DAILY
Status: DISCONTINUED | OUTPATIENT
Start: 2024-03-29 | End: 2024-04-04

## 2024-03-29 NOTE — PLAN OF CARE
Received pt at the change of shift. Pt A&Ox4, follows commands. Pt placed on 2LNC tolerating well. Ice packs placed on hip are. Old drainage on right hip. Pain medication given see eMAR. Purewick in place. NSR on tele. No complaints of shortness of breath. Daughter at bedside and updated on plan of care. Patient updated on plan of care. Transfer orders in place. Call light within reach. See flowsheets for further information.

## 2024-03-29 NOTE — PROGRESS NOTES
[facility]   part of [facility]     Hospitalist Progress Note     Valeria Ramey Patient Status:  Inpatient    1934 MRN [id number]   Location [facility] 4SW-A Attending Kesha Torres,    Hosp Day # 2 PCP JOSIAH VASQUEZ MD     Chief Complaint: Fall    Subjective:     Complaining of Left rib pain  Did not sleep well and confused     Objective:    Review of Systems:   A comprehensive review of systems was completed; pertinent positive and negatives stated in subjective.    Vital signs:  Temp:  [97.2 °F (36.2 °C)-98.3 °F (36.8 °C)] 97.7 °F (36.5 °C)  Pulse:  [76-99] 99  Resp:  [16-26] 16  BP: (107-147)/(43-95) 147/67  SpO2:  [89 %-96 %] 95 %    Physical Exam:    General: No acute distress, a little confused today   Respiratory: No wheezes, no rhonchi  Cardiovascular: S1, S2, regular rate and rhythm  Abdomen: Soft, Non-tender, non-distended, positive bowel sounds  Neuro: No new focal deficits.   Extremities: No edema      Diagnostic Data:    Labs:  Recent Labs   Lab 24  0958 24  0423   WBC 9.4 23.4*  --   --  17.5*   HGB 12.1 7.5*  --  10.9* 10.1*   MCV 97.2 98.8  --   --  87.7   .0 215.0  --   --  149.0*   INR  --   --  1.17  --   --        Recent Labs   Lab 24  0423   * 193* 106*   BUN 21 26* 28*   CREATSERUM 1.28* 1.33* 1.07*   CA 9.0 8.0* 7.7*   ALB 3.2* 2.6* 2.4*    140 138   K 4.2 4.9 4.5    108 110   CO2 28.0 24.0 24.0   ALKPHO 69 52* 46*   AST 12* 16 23   ALT 20 17 15   BILT 0.2 0.2 0.4   TP 6.6 5.5* 5.1*       Estimated Creatinine Clearance: 25.6 mL/min (A) (based on SCr of 1.07 mg/dL (H)).    No results for input(s): \"TROP\", \"TROPHS\", \"CK\" in the last 168 hours.    Recent Labs   Lab 24  0226   PTP 15.0*   INR 1.17                  Microbiology    Hospital Encounter on 24   1. Blood Culture     Status: None (Preliminary result)    Collection Time: 24   3:25 AM    Specimen: Blood,peripheral   Result Value Ref Range    Blood Culture Result No Growth 1 Day N/A         Imaging: Reviewed in Epic.    Medications:    polyethylene glycol (PEG 3350)  17 g Oral Daily    melatonin  10 mg Oral Nightly    donepezil  5 mg Oral Nightly    sennosides  17.2 mg Oral Nightly    multivitamin  1 tablet Oral Daily    enoxaparin  30 mg Subcutaneous Daily    atorvastatin  10 mg Oral Daily    escitalopram  10 mg Oral Daily    cefTRIAXone  1 g Intravenous Q24H    orphenadrine citrate  30 mg Intravenous Q12H    dorzolamide-timolol  1 drop Both Eyes BID    latanoprost  1 drop Both Eyes Nightly    amitriptyline  25 mg Oral Nightly       Assessment & Plan:      #Right hip fracture s/p Right femur intramedullary nailing 3/27  -pain management per Ortho  -PT/OT    #Hemorrhagic Shock 2/2 blood loss, doubt septic etiology   -monitor for any ongoing bleeding  -PRBC transfusion support as needed  -Vasopressors off  -Midodrine, taking PRN PTA  -Blood cultures   -U/A with evidence of infection - Rocephin     #Acute Blood Loss Anemia  -as above    #Hx of essential HTN  -hold Coreg, patient reports she was prescribed but never took     #Dementia with hospital Delirium   -Donepizil  -Nightly PRN Seroquel       #Depression - Elavil, lexapro  #Orthostatic hypotension - midodrine PRN      Kesha Torres DO    Supplementary Documentation:     Quality:  DVT Mechanical Prophylaxis:   SCDs, Early ambuation  DVT Pharmacologic Prophylaxis   Medication    enoxaparin (Lovenox) 30 MG/0.3ML SUBQ injection 30 mg                Code Status: DNAR/Selective Treatment  Kirk: External urinary catheter in place    At this point Ms. Ramey is expected to be discharge to: TBD    The 21st Century Cures Act makes medical notes like these available to patients in the interest of transparency. Please be advised this is a medical document. Medical documents are intended to carry relevant information, facts as evident, and the  clinical opinion of the practitioner. The medical note is intended as peer to peer communication and may appear blunt or direct. It is written in medical language and may contain abbreviations or verbiage that are unfamiliar.             **Certification      PHYSICIAN Certification of Need for Inpatient Hospitalization - Initial Certification    Patient will require inpatient services that will reasonably be expected to span two midnight's based on the clinical documentation in H+P.   Based on patients current state of illness, I anticipate that, after discharge, patient will require TBD.

## 2024-03-29 NOTE — PROGRESS NOTES
NURSING ADMISSION NOTE      Patient admitted via  Bed  Oriented to room.  Safety precautions initiated.  Bed in low position.  Call light in reach.

## 2024-03-29 NOTE — PROGRESS NOTES
ICU  Critical Care APRN Progress Note    NAME: Valeria Ramey - ROOM:  PACU/ PACU Pool - MRN: [id number] - Age: 89 year old - :1934    Subjective:  No acute events overnight.  Pain controlled.        OBJECTIVE  Vitals:  /58 (BP Location: Left arm)   Pulse 81   Temp 97.2 °F (36.2 °C) (Temporal)   Resp 24   Ht 152.4 cm (5')   Wt 171 lb 8.3 oz (77.8 kg)   SpO2 96%   BMI 33.50 kg/m²                Physical Exam:    General Appearance: Alert, confused  Neck: No JVD, neck supple, no adenopathy, trachea midline, no carotid bruits  Lungs: Clear to auscultation bilaterally, respirations unlabored  Heart: Regular rate and rhythm, S1 and S2 normal, no murmur, rub or gallop  Abdomen: Soft, non-tender, bowel sounds active all four quadrants, no masses, no organomegaly  Extremities: Extremities normal, atraumatic, no cyanosis or edema, capillary refill <3 sec.    Pulses: 2+ and symmetric all extremities  Skin: Pale, R hip dressing clean dry and intact    Data this admission:  XR HIP W OR WO PELVIS 1 VIEW, RIGHT (CPT=73501)    Result Date: 3/27/2024  CONCLUSION:  The patient is status post open reduction internal fixation of a right femur inter trochanteric fracture.  A intramedullary nail/right and compression screw noted.  Postsurgical changes are noted the soft tissues.   LOCATION:  [facility]   Dictated by (CST): Uriah Agosto MD on 3/27/2024 at 10:35 PM     Finalized by (CST): Uriah Agosto MD on 3/27/2024 at 10:36 PM       XR FLUOROSCOPY C-ARM TIME LESS THAN 1 HOUR (CPT=76000)    Result Date: 3/27/2024  CONCLUSION:  The patient is status post open reduction internal fixation of a right femoral inter trochanteric fracture.  Postsurgical changes are noted in the soft tissues.   LOCATION:  [facility]    Dictated by (CST): Uriah Agosto MD on 3/27/2024 at 9:48 PM     Finalized by (CST): Uriah Agosto MD on 3/27/2024 at 9:49 PM       CT SPINE CERVICAL (CPT=72125)    Result Date:  3/27/2024  CONCLUSION:  No evidence of acute fracture of the cervical spine.  There are moderate to severe multilevel degenerative changes present.  If there is persistent clinical concern then recommend follow-up imaging including MRI.  A preliminary report was provided by [facility].     LOCATION:  [facility]   Dictated by (CST): Matthew Urena MD on 3/27/2024 at 8:21 AM     Finalized by (CST): Matthew Urena MD on 3/27/2024 at 8:25 AM       XR CHEST AP PORTABLE  (CPT=71045)    Result Date: 3/27/2024  CONCLUSION:  1. Shallow inspiratory film without definite focal consolidation.  Limited due to technique.  Further evaluation can be obtained with CT of the chest as clinically indicated.  [facility] M.D. notified of these findings with preliminary radiology report from [facility] services.    LOCATION:  [facility]      Dictated by (CST): Eliana Chew MD on 3/27/2024 at 8:19 AM     Finalized by (CST): Eliana Chew MD on 3/27/2024 at 8:20 AM       XR HIP W OR WO PELVIS 2 OR 3 VIEWS, RIGHT (CPT=73502)    Result Date: 3/27/2024  CONCLUSION:  1. Comminuted displaced right intratrochanteric fracture.  ED M.D. notified of these findings with preliminary radiology report from [facility] services.    LOCATION:  [facility]   Dictated by (CST): Eliana Chew MD on 3/27/2024 at 8:18 AM     Finalized by (CST): Eliana Chew MD on 3/27/2024 at 8:19 AM       CT BRAIN OR HEAD (62077)    Result Date: 3/27/2024  CONCLUSION:  No significant midline shift or mass effect.  No acute intracranial hemorrhage.  Moderate chronic microvascular ischemic changes are likely present within the white matter.  If there is persistent clinical concern then consider MRI.     LOCATION:  [facility]   Dictated by (CST): Matthew Urena MD on 3/27/2024 at 6:08 AM     Finalized by (CST): Matthew Urena MD on 3/27/2024 at 6:09 AM         Labs:  Lab Results   Component Value Date    WBC 17.5 03/29/2024    HGB 10.1 03/29/2024    HCT 29.1 03/29/2024    .0 03/29/2024    CREATSERUM 1.07 03/29/2024     BUN 28 03/29/2024     03/29/2024    K 4.5 03/29/2024     03/29/2024    CO2 24.0 03/29/2024     03/29/2024    CA 7.7 03/29/2024    ALB 2.4 03/29/2024    ALKPHO 46 03/29/2024    BILT 0.4 03/29/2024    TP 5.1 03/29/2024    AST 23 03/29/2024    ALT 15 03/29/2024           Assessment/Plan:  Shock, septic vs hemorrhagic  - Vasopressors to maintain MAP >65, currently off  - continue home midodrine  - Pct normal  - blood cultures NGTD  - TTE EF 60-65%  - Rocephin (3/28- )    Right hip femur fracture POD #1 s/p Right femur intramedullary nailing  - EBL 100cc  - PT/OT, WBAT with walker  - Pain control    Acute blood loss anemia  - Reported EBL ~100cc  - notify ortho surgeon if patient with signs of bleeding  - Transfuse for hgb <7    UTI/leukocytosis  - Final cultures pending  - Afebrile  - Pct normal  - Rocephin    HTN  - Home meds on hold in setting of shock    Depression  - Home meds    F/E/N  -regular diet  - Replete electrolytes per protocol    Proph  - subcutaneous lovenox   - SCDs  - PT/OT    Dispo  - DNAR select (POLST form sent from nursing home)  - may transfer to ortho floor    Plan of care discussed with intensivist on-call, Dr. Ari Velasquez, [facility]  Critical Care NP  Phone [phone]    Pulmonary Critical Care Physician Attestation    I have personally seen and examined the patient.  I have reviewed and agree with Xiao RASOCN's documentation with the following highlights/changes.    Shock improved and hemodynamically stable off pressors. Continue ceftriaxone for E coli UTI, blood cultures NGTD. Stable for transfer to floor, pulmonary will follow as needed out of the ICU.     Mayo Chapman MD  Pulmonary & Critical Care Medicine  [facility]

## 2024-03-29 NOTE — PHYSICAL THERAPY NOTE
PHYSICAL THERAPY TREATMENT NOTE - INPATIENT    Room Number: 386/386-A     Session: 1     Number of Visits to Meet Established Goals: 10    Presenting Problem: R femur fracture  Co-Morbidities : dementia    PHYSICAL THERAPY MEDICAL/SOCIAL HISTORY  History related to current admission: Patient is a 89 year old female admitted on 3/27/2024 from home for fall.  Pt diagnosed with R femur fracture, s/p IM nailing 3/27/24.        HOME SITUATION  Type of Home: Assisted living facility   Home Layout: One level     Lives With: Alone     Prior Level of [place]: Pt reports ambulating to dining room for meals with RW.  Pt is able to get self out of bed in the morning.    ASSESSMENT   Patient demonstrates limited progress this session, goals  remain in progress.    Patient continues to function below baseline with bed mobility, transfers, and gait.  Contributing factors to remaining limitations include pain.  Next session anticipate patient to progress bed mobility and transfers.  Physical Therapy will continue to follow patient for duration of hospitalization.    Patient continues to benefit from continued skilled PT services: to promote return to prior level of function and safety with continuous assistance and gradual rehabilitative therapy .    PLAN  PT Treatment Plan: Bed mobility;Body mechanics;Endurance;Energy conservation;Patient education;Family education;Neuromuscular re-educate;Gait training;Range of motion;Stair training;Transfer training;Balance training  Rehab Potential : Good  Frequency (Obs): 3-5x/week    CURRENT GOALS     Goal #1 Patient is able to demonstrate supine - sit EOB @ level: supervision      Goal #2 Patient is able to demonstrate transfers Sit to/from Stand at assistance level: supervision      Goal #3 Patient is able to ambulate 50 feet with assist device: walker - rolling at assistance level: supervision      Goal #4     Goal #5     Goal #6     Goal Comments: Goals established on  3/28/2024  3/29/2024 all goals ongoing     SUBJECTIVE  \"You need to do things slower\"    OBJECTIVE  Precautions: Bed/chair alarm    WEIGHT BEARING RESTRICTION  Weight Bearing Restriction: R lower extremity        R Lower Extremity: Weight Bearing as Tolerated       PAIN ASSESSMENT   Rating: Unable to rate  Location: R hip, L upper back mm  Management Techniques: Activity promotion;Body mechanics;Relaxation;Repositioning    BALANCE                                                                                                                       Static Sitting: Fair -  Dynamic Sitting: Poor +           Static Standing: Poor +  Dynamic Standing: Dependent    ACTIVITY TOLERANCE                         O2 WALK         AM-PAC '6-Clicks' INPATIENT SHORT FORM - BASIC MOBILITY  How much difficulty does the patient currently have...  Patient Difficulty: Turning over in bed (including adjusting bedclothes, sheets and blankets)?: A Little   Patient Difficulty: Sitting down on and standing up from a chair with arms (e.g., wheelchair, bedside commode, etc.): A Lot   Patient Difficulty: Moving from lying on back to sitting on the side of the bed?: A Little   How much help from another person does the patient currently need...   Help from Another: Moving to and from a bed to a chair (including a wheelchair)?: Total   Help from Another: Need to walk in hospital room?: Total   Help from Another: Climbing 3-5 steps with a railing?: Total       AM-PAC Score:  Raw Score: 11   Approx Degree of Impairment: 72.57%   Standardized Score (AM-PAC Scale): 33.86   CMS Modifier (G-Code): CL    FUNCTIONAL ABILITY STATUS  Gait Assessment   Functional Mobility/Gait Assessment  Gait Assistance: Not tested  Distance (ft): 0    Skilled Therapy Provided    Bed Mobility:  Rolling: NT   Supine<>Sit: Max A   Sit<>Supine: Max A x 2     Transfer Mobility:  Sit<>Stand: Mod A x 2   Stand<>Sit: Mod A x 2   Gait: NT    Therapist's Comments: Pt able to  complete two partial stands to RW with Max  A x 2.  Writer obtained Sandy Steady lift - Pt able to stand with Mod A x 1 person, and able to stand ~60 seconds (letting go of lift at one point to scratch her arm) during bed linen change.      Pt returned to supine - d/t transfer to ortho/spine unit shortly.     Pt encouraged to get up to chair for all meals.       THERAPEUTIC EXERCISES  Lower Extremity Ankle pumps  Glut sets  Knee extension     Upper Extremity Elbow flex/ext and  - open/close     Position Sitting     Repetitions      Sets   0     Patient End of Session: In bed;With  staff;Needs met;Call light within reach;RN aware of session/findings;All patient questions and concerns addressed;Alarm set;Family present    PT Session Time: 25 minutes  Gait Trainin minutes  Therapeutic Activity: 15 minutes  Therapeutic Exercise: 0 minutes   Neuromuscular Re-education: 8 minutes

## 2024-03-29 NOTE — CONGREGATE LIVING REVIEW
Formerly [facility] Living Authorization    The [facility] Review Committee has reviewed this case and the patient IS APPROVED for discharge to a facility for Short Term Skilled once the following procedure is followed:     - The physician discharge instructions (contained within the DYAN note for SNF) must inlcude the below appropriate and approved COVID instructions to the facility    For questions regarding CLRC approval process, please contact the CM assigned to the case.  For questions regarding RN discharge workflow, please contact the unit Clinical Leader.

## 2024-03-29 NOTE — PLAN OF CARE
Patient alert and oriented x4, forgetful at times. VSS on RA. Tele in NSR. Pain controlled with PO PRN medications. Denies n/t. X3 aquacels to right hip, CDI. SCDs in place, ankle pumps encouraged, IS encouraged. Pt up to chair with brett steady. Tolerating diet, no c/o n/v. Voiding freely with purewick in place.     Plan: discharge to [facility] when cleared by all services    Update 1857: Pts heart rate 180s while sitting and resting. Laid pt down and HR went down to 90s-low 100s. EKG done and showed NSR. Per Dr. Renteria notify if occurs again.

## 2024-03-29 NOTE — PLAN OF CARE
Received pt at shift change on 2LNC alert and oriented. On assessment pt was able to follow commands, but had some confusion in regard to her breakfast, pt was reoriented. VSS,afebrile,complains of hip/back pain. See mar for medication administration and flowsheets for more information. Daughter at bedside updated on patients plan of care. Transfer orders in and report called at 1145. Pt moved to 386. Safety and comfort maintained throughout shift, all lines and drains intact.  Problem: PAIN - ADULT  Goal: Verbalizes/displays adequate comfort level or patient's stated pain goal  Description: INTERVENTIONS:  - Encourage pt to monitor pain and request assistance  - Assess pain using appropriate pain scale  - Administer analgesics based on type and severity of pain and evaluate response  - Implement non-pharmacological measures as appropriate and evaluate response  - Consider cultural and social influences on pain and pain management  - Manage/alleviate anxiety  - Utilize distraction and/or relaxation techniques  - Monitor for opioid side effects  - Notify MD/LIP if interventions unsuccessful or patient reports new pain  - Anticipate increased pain with activity and pre-medicate as appropriate  Outcome: Progressing     Problem: RISK FOR INFECTION - ADULT  Goal: Absence of fever/infection during anticipated neutropenic period  Description: INTERVENTIONS  - Monitor WBC  - Administer growth factors as ordered  - Implement neutropenic guidelines  Outcome: Progressing     Problem: SAFETY ADULT - FALL  Goal: Free from fall injury  Description: INTERVENTIONS:  - Assess pt frequently for physical needs  - Identify cognitive and physical deficits and behaviors that affect risk of falls.  - Kouts fall precautions as indicated by assessment.  - Educate pt/family on patient safety including physical limitations  - Instruct pt to call for assistance with activity based on assessment  - Modify environment to reduce risk of  injury  - Provide assistive devices as appropriate  - Consider OT/PT consult to assist with strengthening/mobility  - Encourage toileting schedule  Outcome: Progressing     Problem: DISCHARGE PLANNING  Goal: Discharge to home or other facility with appropriate resources  Description: INTERVENTIONS:  - Identify barriers to discharge w/pt and caregiver  - Include patient/family/discharge partner in discharge planning  - Arrange for needed discharge resources and transportation as appropriate  - Identify discharge learning needs (meds, wound care, etc)  - Arrange for interpreters to assist at discharge as needed  - Consider post-discharge preferences of patient/family/discharge partner  - Complete POLST form as appropriate  - Assess patient's ability to be responsible for managing their own health  - Refer to Case Management Department for coordinating discharge planning if the patient needs post-hospital services based on physician/LIP order or complex needs related to functional status, cognitive ability or social support system  Outcome: Progressing

## 2024-03-29 NOTE — CM/SW NOTE
Call received from Alison with [facility] this am. Update on pt's status and recommendations for AC. Discussed that AC facility is TBD. Met w/ pt and her dtrs to provide AC choice list. Pt's dtr/HCPOA not at bedside yet this am. Pt's dtr Xena asks for SW to follow up when HCPLORY Scott arrives.     1220: Met w/ pt and family to follow up re: AC choice. Pt in the process of transferring to Ortho/Spine room 386. Will follow up w/ pt/family once they are able to review options. Pt's dtr Sonia reports that they have already completed a POLST form and that [facility] should have a copy. Will reach out to Alison and request POLST faxed to add to pt's EMR.    1500: Followed up w/ pt/family to obtain AC choice. Pt/family would like [facility]. Facility reserved in [place].     CM/SW will remain available for DC planning and/or support.     ALY TorresN, CMSRN    [id number]

## 2024-03-30 LAB
ALBUMIN SERPL-MCNC: 2.1 G/DL (ref 3.4–5)
ALBUMIN/GLOB SERPL: 0.7 {RATIO} (ref 1–2)
ALP LIVER SERPL-CCNC: 55 U/L
ALT SERPL-CCNC: 14 U/L
ANION GAP SERPL CALC-SCNC: 9 MMOL/L (ref 0–18)
AST SERPL-CCNC: 23 U/L (ref 15–37)
BILIRUB SERPL-MCNC: 0.6 MG/DL (ref 0.1–2)
BUN BLD-MCNC: 14 MG/DL (ref 9–23)
CALCIUM BLD-MCNC: 7.9 MG/DL (ref 8.5–10.1)
CHLORIDE SERPL-SCNC: 109 MMOL/L (ref 98–112)
CO2 SERPL-SCNC: 19 MMOL/L (ref 21–32)
CREAT BLD-MCNC: 0.69 MG/DL
EGFRCR SERPLBLD CKD-EPI 2021: 83 ML/MIN/1.73M2 (ref 60–?)
ERYTHROCYTE [DISTWIDTH] IN BLOOD BY AUTOMATED COUNT: 15.9 %
GLOBULIN PLAS-MCNC: 3.1 G/DL (ref 2.8–4.4)
GLUCOSE BLD-MCNC: 98 MG/DL (ref 70–99)
HCT VFR BLD AUTO: 30.5 %
HGB BLD-MCNC: 9.5 G/DL
MCH RBC QN AUTO: 30 PG (ref 26–34)
MCHC RBC AUTO-ENTMCNC: 31.1 G/DL (ref 31–37)
MCV RBC AUTO: 96.2 FL
OSMOLALITY SERPL CALC.SUM OF ELEC: 284 MOSM/KG (ref 275–295)
PLATELET # BLD AUTO: 147 10(3)UL (ref 150–450)
POTASSIUM SERPL-SCNC: 4.5 MMOL/L (ref 3.5–5.1)
PROT SERPL-MCNC: 5.2 G/DL (ref 6.4–8.2)
RBC # BLD AUTO: 3.17 X10(6)UL
SODIUM SERPL-SCNC: 137 MMOL/L (ref 136–145)
WBC # BLD AUTO: 14.9 X10(3) UL (ref 4–11)

## 2024-03-30 PROCEDURE — 99233 SBSQ HOSP IP/OBS HIGH 50: CPT | Performed by: HOSPITALIST

## 2024-03-30 RX ORDER — CALCIUM CARBONATE 500 MG/1
1000 TABLET, CHEWABLE ORAL 3 TIMES DAILY PRN
Status: DISCONTINUED | OUTPATIENT
Start: 2024-03-30 | End: 2024-04-04

## 2024-03-30 NOTE — PLAN OF CARE
Pt A&Ox3, forgetful at times. VSS on 2L O2 NC. IV abx. 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 Aquacel dsg x3 dry and intact, old drainage. Repositioned frequently for comfort. Pain well managed with [place] prn. WBAT, PT/OT to see. Plan to discharge to [facility] once medically cleared. Dtr at bedside. Call light within reach. Will continue to monitor.

## 2024-03-30 NOTE — PROGRESS NOTES
[facility]   part of [facility]     Hospitalist Progress Note     Valeria Ramey Patient Status:  Inpatient    1934 MRN [id number]   Location [place] 4SW-A Attending Kesha Torres,    Hosp Day # 3 PCP JOSIAH VASQUEZ MD     Chief Complaint: Fall    Subjective:     No acute events, pain controlled.    Objective:    Review of Systems:   A comprehensive review of systems was completed; pertinent positive and negatives stated in subjective.    Vital signs:  Temp:  [97.4 °F (36.3 °C)-98.7 °F (37.1 °C)] 97.9 °F (36.6 °C)  Pulse:  [] 86  Resp:  [14-33] 17  BP: (101-142)/(41-65) 132/65  SpO2:  [87 %-98 %] 93 %    Physical Exam:    General: No acute distress, a little confused today   Respiratory: No wheezes, no rhonchi  Cardiovascular: S1, S2, regular rate and rhythm  Abdomen: Soft, Non-tender, non-distended, positive bowel sounds  Neuro: No new focal deficits.   Extremities: No edema      Diagnostic Data:    Labs:  Recent Labs   Lab 246 24  0958 24  0459   WBC 9.4 23.4*  --   --  17.5* 14.9*   HGB 12.1 7.5*  --  10.9* 10.1* 9.5*   MCV 97.2 98.8  --   --  87.7 96.2   .0 215.0  --   --  149.0* 147.0*   INR  --   --  1.17  --   --   --        Recent Labs   Lab 24  0459   * 106* 98   BUN 26* 28* 14   CREATSERUM 1.33* 1.07* 0.69   CA 8.0* 7.7* 7.9*   ALB 2.6* 2.4* 2.1*    138 137   K 4.9 4.5 4.5    110 109   CO2 24.0 24.0 19.0*   ALKPHO 52* 46* 55   AST 16 23 23   ALT 17 15 14   BILT 0.2 0.4 0.6   TP 5.5* 5.1* 5.2*       Estimated Creatinine Clearance: 39.7 mL/min (based on SCr of 0.69 mg/dL).    No results for input(s): \"TROP\", \"TROPHS\", \"CK\" in the last 168 hours.    Recent Labs   Lab 24  0226   PTP 15.0*   INR 1.17                  Microbiology    Hospital Encounter on 24   1. Blood Culture     Status: None (Preliminary result)    Collection Time:  03/28/24  3:25 AM    Specimen: Blood,peripheral   Result Value Ref Range    Blood Culture Result No Growth 2 Days N/A   2. Urine Culture, Routine     Status: Abnormal    Collection Time: 03/27/24  3:58 PM    Specimen: Urine, clean catch   Result Value Ref Range    Urine Culture >100,000 CFU/ML Escherichia coli (A) N/A       Susceptibility    Escherichia coli -  (no method available)     Ampicillin 4 Sensitive      Cefazolin <=4 Sensitive      Ciprofloxacin <=0.25 Sensitive      Gentamicin <=1 Sensitive      Meropenem <=0.25 Sensitive      Levofloxacin <=0.12 Sensitive      Nitrofurantoin <=16 Sensitive      Piperacillin + Tazobactam <=4 Sensitive      Trimethoprim/Sulfa <=20 Sensitive          Imaging: Reviewed in Epic.    Medications:    polyethylene glycol (PEG 3350)  17 g Oral Daily    melatonin  10 mg Oral Nightly    donepezil  5 mg Oral Nightly    lidocaine-menthol  1 patch Transdermal Daily    sennosides  17.2 mg Oral Nightly    multivitamin  1 tablet Oral Daily    enoxaparin  30 mg Subcutaneous Daily    atorvastatin  10 mg Oral Daily    escitalopram  10 mg Oral Daily    cefTRIAXone  1 g Intravenous Q24H    dorzolamide-timolol  1 drop Both Eyes BID    latanoprost  1 drop Both Eyes Nightly    amitriptyline  25 mg Oral Nightly       Assessment & Plan:      #Right hip fracture s/p Right femur intramedullary nailing 3/27  -pain management per Ortho  -PT/OT    #Hemorrhagic Shock 2/2 blood loss, doubt septic etiology   -monitor for any ongoing bleeding  -PRBC transfusion support as needed  -Vasopressors off  -Midodrine, taking PRN PTA  -Blood cultures     #E coli UTI, on IV Rocephin    #Acute Blood Loss Anemia  -as above  -down trending, cont to monitor    #Hx of essential HTN  -hold Coreg, patient reports she was prescribed but never took     #Dementia with hospital Delirium   -Donepizil  -Nightly PRN Seroquel     #Depression - Elavil, lexapro  #Orthostatic hypotension - midodrine PRN    Shawna Matthews,  MD      Supplementary Documentation:     Quality:  DVT Mechanical Prophylaxis:   SCDs, Early ambuation  DVT Pharmacologic Prophylaxis   Medication    enoxaparin (Lovenox) 30 MG/0.3ML SUBQ injection 30 mg                Code Status: DNAR/Selective Treatment  Kirk: External urinary catheter in place    At this point Ms. Ramey is expected to be discharge to: TBD    The 21st Century Cures Act makes medical notes like these available to patients in the interest of transparency. Please be advised this is a medical document. Medical documents are intended to carry relevant information, facts as evident, and the clinical opinion of the practitioner. The medical note is intended as peer to peer communication and may appear blunt or direct. It is written in medical language and may contain abbreviations or verbiage that are unfamiliar.             **Certification      PHYSICIAN Certification of Need for Inpatient Hospitalization - Initial Certification    Patient will require inpatient services that will reasonably be expected to span two midnight's based on the clinical documentation in H+P.   Based on patients current state of illness, I anticipate that, after discharge, patient will require TBD.

## 2024-03-30 NOTE — PLAN OF CARE
pt resting in bed, given pain meds as requesting see mar. Pt & family verbalized understanding of poc & call dont fall protocol. Bed alarm in place. Call light within reach. Dressing dry and intact. Pw in place. To repositioned frequently. Pt daughters at bedside requesting more pain meds at this time. Vss at this time. Pt requesting tums, on call I'm md updated. Pt c/o nausea when turned to change brief & pt in bed.

## 2024-03-30 NOTE — PHYSICAL THERAPY NOTE
PHYSICAL THERAPY TREATMENT NOTE - INPATIENT    Room Number: 386/386-A     Session: 2     Number of Visits to Meet Established Goals: 10    Presenting Problem: R femur fracture  Co-Morbidities : dementia    PHYSICAL THERAPY MEDICAL/SOCIAL HISTORY  History related to current admission: Patient is a 89 year old female admitted on 3/27/2024 from home for fall.  Pt diagnosed with R femur fracture, s/p IM nailing 3/27/24.        HOME SITUATION  Type of Home: Assisted living facility   Home Layout: One level     Lives With: Alone     Prior Level of [place]: Pt reports ambulating to dining room for meals with RW.  Pt is able to get self out of bed in the morning.    ASSESSMENT   Patient demonstrates limited progress this session, goals  remain in progress.    Patient continues to function below baseline with bed mobility, transfers, and gait.  Contributing factors to remaining limitations include pain, cognitive deficits (poor carryover/fear of falling), and decreased compliance/participation.  Next session anticipate patient to progress bed mobility and transfers.  Physical Therapy will continue to follow patient for duration of hospitalization.    Patient continues to benefit from continued skilled PT services: to promote return to prior level of function and safety with continuous assistance and gradual rehabilitative therapy .    PLAN  PT Treatment Plan: Bed mobility;Body mechanics;Endurance;Energy conservation;Patient education;Family education;Neuromuscular re-educate;Gait training;Range of motion;Stair training;Transfer training;Balance training  Rehab Potential : Good  Frequency (Obs): 3-5x/week    CURRENT GOALS     Goal #1 Patient is able to demonstrate supine - sit EOB @ level: supervision      Goal #2 Patient is able to demonstrate transfers Sit to/from Stand at assistance level: supervision      Goal #3 Patient is able to ambulate 50 feet with assist device: walker - rolling at assistance level:  supervision      Goal #4     Goal #5     Goal #6     Goal Comments: Goals established on 3/28/2024    3/30/2024 all goals ongoing     SUBJECTIVE    \"Wait, what are we doing\"    OBJECTIVE  Precautions: Bed/chair alarm    WEIGHT BEARING RESTRICTION  Weight Bearing Restriction: R lower extremity        R Lower Extremity: Weight Bearing as Tolerated       PAIN ASSESSMENT   Rating: Unable to rate  Location: R hip, L upper back mm  Management Techniques: Activity promotion;Body mechanics;Relaxation;Repositioning    BALANCE                                                                                                                       Static Sitting: Fair -  Dynamic Sitting: Poor +           Static Standing: Poor +  Dynamic Standing: Dependent    ACTIVITY TOLERANCE                         O2 WALK         AM-PAC '6-Clicks' INPATIENT SHORT FORM - BASIC MOBILITY  How much difficulty does the patient currently have...  Patient Difficulty: Turning over in bed (including adjusting bedclothes, sheets and blankets)?: A Little   Patient Difficulty: Sitting down on and standing up from a chair with arms (e.g., wheelchair, bedside commode, etc.): A Lot   Patient Difficulty: Moving from lying on back to sitting on the side of the bed?: A Little   How much help from another person does the patient currently need...   Help from Another: Moving to and from a bed to a chair (including a wheelchair)?: Total   Help from Another: Need to walk in hospital room?: Total   Help from Another: Climbing 3-5 steps with a railing?: Total       AM-PAC Score:  Raw Score: 11   Approx Degree of Impairment: 72.57%   Standardized Score (AM-PAC Scale): 33.86   CMS Modifier (G-Code): CL    FUNCTIONAL ABILITY STATUS  Gait Assessment   Functional Mobility/Gait Assessment  Gait Assistance: Dependent assistance (Max A x 2)  Distance (ft): 12 inches  Assistive Device: Rolling walker  Pattern: R Foot drag;Shuffle (unable to weight shift to R side to advance  LLE)    Skilled Therapy Provided    Bed Mobility:  Rolling: NT   Supine<>Sit: Max A   Sit<>Supine: NT    Transfer Mobility:  Sit<>Stand: Mod A x 2   Stand<>Sit: Mod A x 2   Gait: Max A x 2    Pt requires increased time for any and all mobility - prefers to self direct session    Pt able to stand to brett steady x 2 attempts - standing for ~60 seconds  Pt then transferred to  recliner - able to progress to standing to bed railing x 2 attempts with mod A x 2    Pt brought into hallway via chair - attempted a few steps of ambulation - pt able to heel/toe wiggle and take a few unsteady steps, then sat back to chair.  RN witnessed attempt.     Recommend use of brett steady lift with nursing staff     Therapist's Comments: Pt encouraged to get up to chair for all meals.       THERAPEUTIC EXERCISES  Lower Extremity Ankle pumps  Glut sets  Knee extension     Upper Extremity Elbow flex/ext and  - open/close     Position Sitting     Repetitions      Sets   0     Patient End of Session: Up in chair;Needs met;Call light within reach;RN aware of session/findings;All patient questions and concerns addressed;Alarm set    PT Session Time: 23 minutes  Gait Trainin minutes  Therapeutic Activity: 15 minutes  Therapeutic Exercise: 0 minutes   Neuromuscular Re-education: 8 minutes

## 2024-03-30 NOTE — PHYSICAL THERAPY NOTE
Attempted to see pt for f/u therapy session. Pt's daughter politely requesting return at another time as pt is taking a nap. Will try again as long as schedule allows.

## 2024-03-31 ENCOUNTER — APPOINTMENT (OUTPATIENT)
Dept: GENERAL RADIOLOGY | Facility: HOSPITAL | Age: 89
End: 2024-03-31
Attending: HOSPITALIST
Payer: MEDICARE

## 2024-03-31 LAB
ALBUMIN SERPL-MCNC: 2.1 G/DL (ref 3.4–5)
ALBUMIN/GLOB SERPL: 0.7 {RATIO} (ref 1–2)
ALP LIVER SERPL-CCNC: 60 U/L
ALT SERPL-CCNC: 15 U/L
ANION GAP SERPL CALC-SCNC: 3 MMOL/L (ref 0–18)
AST SERPL-CCNC: 22 U/L (ref 15–37)
ATRIAL RATE: 93 BPM
ATRIAL RATE: 99 BPM
BILIRUB SERPL-MCNC: 0.6 MG/DL (ref 0.1–2)
BUN BLD-MCNC: 15 MG/DL (ref 9–23)
CALCIUM BLD-MCNC: 8 MG/DL (ref 8.5–10.1)
CHLORIDE SERPL-SCNC: 107 MMOL/L (ref 98–112)
CO2 SERPL-SCNC: 24 MMOL/L (ref 21–32)
CREAT BLD-MCNC: 0.84 MG/DL
EGFRCR SERPLBLD CKD-EPI 2021: 66 ML/MIN/1.73M2 (ref 60–?)
ERYTHROCYTE [DISTWIDTH] IN BLOOD BY AUTOMATED COUNT: 15.4 %
GLOBULIN PLAS-MCNC: 2.9 G/DL (ref 2.8–4.4)
GLUCOSE BLD-MCNC: 96 MG/DL (ref 70–99)
HCT VFR BLD AUTO: 29.3 %
HGB BLD-MCNC: 8.8 G/DL
MCH RBC QN AUTO: 29.7 PG (ref 26–34)
MCHC RBC AUTO-ENTMCNC: 30 G/DL (ref 31–37)
MCV RBC AUTO: 99 FL
OSMOLALITY SERPL CALC.SUM OF ELEC: 279 MOSM/KG (ref 275–295)
P AXIS: 29 DEGREES
P AXIS: 61 DEGREES
P-R INTERVAL: 156 MS
P-R INTERVAL: 164 MS
PLATELET # BLD AUTO: 165 10(3)UL (ref 150–450)
POTASSIUM SERPL-SCNC: 4.4 MMOL/L (ref 3.5–5.1)
PROT SERPL-MCNC: 5 G/DL (ref 6.4–8.2)
Q-T INTERVAL: 334 MS
Q-T INTERVAL: 354 MS
QRS DURATION: 74 MS
QRS DURATION: 84 MS
QTC CALCULATION (BEZET): 428 MS
QTC CALCULATION (BEZET): 440 MS
R AXIS: 25 DEGREES
R AXIS: 63 DEGREES
RBC # BLD AUTO: 2.96 X10(6)UL
SODIUM SERPL-SCNC: 134 MMOL/L (ref 136–145)
T AXIS: 36 DEGREES
T AXIS: 56 DEGREES
VENTRICULAR RATE: 93 BPM
VENTRICULAR RATE: 99 BPM
WBC # BLD AUTO: 13.6 X10(3) UL (ref 4–11)

## 2024-03-31 PROCEDURE — 72100 X-RAY EXAM L-S SPINE 2/3 VWS: CPT | Performed by: HOSPITALIST

## 2024-03-31 PROCEDURE — 99233 SBSQ HOSP IP/OBS HIGH 50: CPT | Performed by: HOSPITALIST

## 2024-03-31 PROCEDURE — 72072 X-RAY EXAM THORAC SPINE 3VWS: CPT | Performed by: HOSPITALIST

## 2024-03-31 RX ORDER — CEPHALEXIN 500 MG/1
500 CAPSULE ORAL EVERY 8 HOURS SCHEDULED
Status: DISCONTINUED | OUTPATIENT
Start: 2024-04-01 | End: 2024-04-03

## 2024-03-31 RX ORDER — HYDROCODONE BITARTRATE AND ACETAMINOPHEN 5; 325 MG/1; MG/1
1 TABLET ORAL EVERY 4 HOURS PRN
Qty: 24 TABLET | Refills: 0 | Status: SHIPPED | OUTPATIENT
Start: 2024-03-31

## 2024-03-31 RX ORDER — CEPHALEXIN 500 MG/1
500 CAPSULE ORAL EVERY 8 HOURS SCHEDULED
Qty: 9 CAPSULE | Refills: 0 | Status: SHIPPED | OUTPATIENT
Start: 2024-04-01 | End: 2024-04-03

## 2024-03-31 RX ORDER — ASPIRIN 81 MG/1
81 TABLET ORAL 2 TIMES DAILY
Qty: 56 TABLET | Refills: 0 | Status: SHIPPED | OUTPATIENT
Start: 2024-03-31

## 2024-03-31 RX ORDER — DORZOLAMIDE HYDROCHLORIDE AND TIMOLOL MALEATE 20; 5 MG/ML; MG/ML
1 SOLUTION/ DROPS OPHTHALMIC 2 TIMES DAILY
Qty: 1 EACH | Refills: 0 | Status: SHIPPED | OUTPATIENT
Start: 2024-03-31

## 2024-03-31 RX ORDER — ENOXAPARIN SODIUM 100 MG/ML
30 INJECTION SUBCUTANEOUS DAILY
Qty: 14 EACH | Refills: 0 | Status: SHIPPED | OUTPATIENT
Start: 2024-04-01 | End: 2024-04-01

## 2024-03-31 NOTE — CM/SW NOTE
03/31/24 1000   Discharge disposition   Expected discharge disposition subacute   Post Acute Care Provider   ([facility])   Discharge transportation [facility]     BETSY completed chart review, anticipating medical clearance today. BETSY spoke to Erika from [facility], per Erika only semi private room available today. Pt will need [facility] transport. BETSY will reach out to pt dtr once clearance given. Awaiting medical clearance.    Addendum 1430:  BETSY spoke to dtr Sonia, agreeable to dc to semi private with plans to transfer to private room once available. Dtr requesting transport, BLS on will call. PCS done. Awaiting medical clearance.    [facility] Phone [phone]     [facility] [phone]    Addendum 3800:  BETSY notified by RN dc cancelled, updated Erika from [facility]. BLS on will call through 4/2/24. PCS updated.    BTESY/NEHA to remain available for dc planning, and/or additional need for support.    James Graf, ASHLI  Discharge Planner  [id number]

## 2024-03-31 NOTE — PROGRESS NOTES
[facility]   part of [facility]     Hospitalist Progress Note     Valeria Ramey Patient Status:  Inpatient    1934 MRN [id number]   Location [facility] 4SW-A Attending Kesha Torres, DO   Hosp Day # 4 PCP JOSIAH VASQUEZ MD     Chief Complaint: Fall    Subjective:     No acute events, pain controlled, daughter stating pt having some mid to low back pain.    Objective:    Review of Systems:   A comprehensive review of systems was completed; pertinent positive and negatives stated in subjective.    Vital signs:  Temp:  [98 °F (36.7 °C)-98.8 °F (37.1 °C)] 98 °F (36.7 °C)  Pulse:  [] 75  Resp:  [13-22] 13  BP: (104-142)/(49-63) 111/49  SpO2:  [91 %-96 %] 96 %    Physical Exam:    General: No acute distress, a little confused today   Respiratory: No wheezes, no rhonchi  Cardiovascular: S1, S2, regular rate and rhythm  Abdomen: Soft, Non-tender, non-distended, positive bowel sounds  Neuro: No new focal deficits.   Extremities: No edema      Diagnostic Data:    Labs:  Recent Labs   Lab 24  0212 24  0214 24  0226 24  0958 24  0423 24  04524  0440   WBC 9.4 23.4*  --   --  17.5* 14.9* 13.6*   HGB 12.1 7.5*  --  10.9* 10.1* 9.5* 8.8*   MCV 97.2 98.8  --   --  87.7 96.2 99.0   .0 215.0  --   --  149.0* 147.0* 165.0   INR  --   --  1.17  --   --   --   --        Recent Labs   Lab 24  0423 24  0459 24  0440   * 98 96   BUN 28* 14 15   CREATSERUM 1.07* 0.69 0.84   CA 7.7* 7.9* 8.0*   ALB 2.4* 2.1* 2.1*    137 134*   K 4.5 4.5 4.4    109 107   CO2 24.0 19.0* 24.0   ALKPHO 46* 55 60   AST 23 23 22   ALT 15 14 15   BILT 0.4 0.6 0.6   TP 5.1* 5.2* 5.0*       Estimated Creatinine Clearance: 32.6 mL/min (based on SCr of 0.84 mg/dL).    No results for input(s): \"TROP\", \"TROPHS\", \"CK\" in the last 168 hours.    Recent Labs   Lab 24  0226   PTP 15.0*   INR 1.17                  Microbiology    Hospital Encounter on  03/27/24   1. Blood Culture     Status: None (Preliminary result)    Collection Time: 03/28/24  3:25 AM    Specimen: Blood,peripheral   Result Value Ref Range    Blood Culture Result No Growth 3 Days N/A   2. Urine Culture, Routine     Status: Abnormal    Collection Time: 03/27/24  3:58 PM    Specimen: Urine, clean catch   Result Value Ref Range    Urine Culture >100,000 CFU/ML Escherichia coli (A) N/A       Susceptibility    Escherichia coli -  (no method available)     Ampicillin 4 Sensitive      Cefazolin <=4 Sensitive      Ciprofloxacin <=0.25 Sensitive      Gentamicin <=1 Sensitive      Meropenem <=0.25 Sensitive      Levofloxacin <=0.12 Sensitive      Nitrofurantoin <=16 Sensitive      Piperacillin + Tazobactam <=4 Sensitive      Trimethoprim/Sulfa <=20 Sensitive          Imaging: Reviewed in Epic.    Medications:    [START ON 4/1/2024] cephalexin  500 mg Oral Q8H KAR    polyethylene glycol (PEG 3350)  17 g Oral Daily    melatonin  10 mg Oral Nightly    donepezil  5 mg Oral Nightly    lidocaine-menthol  1 patch Transdermal Daily    sennosides  17.2 mg Oral Nightly    multivitamin  1 tablet Oral Daily    enoxaparin  30 mg Subcutaneous Daily    atorvastatin  10 mg Oral Daily    escitalopram  10 mg Oral Daily    dorzolamide-timolol  1 drop Both Eyes BID    latanoprost  1 drop Both Eyes Nightly    amitriptyline  25 mg Oral Nightly       Assessment & Plan:      #Right hip fracture s/p Right femur intramedullary nailing 3/27  -pain management per Ortho  -PT/OT    #Hemorrhagic Shock 2/2 blood loss, doubt septic etiology   -monitor for any ongoing bleeding  -PRBC transfusion support as needed  -Vasopressors off  -Midodrine, taking PRN PTA  -Blood cultures     #E coli UTI, on Keflex    #Acute Blood Loss Anemia  -as above  -down trending, cont to monitor    #Hx of essential HTN  -hold Coreg, patient reports she was prescribed but never took     #Dementia with hospital Delirium   -Donepizil  -Nightly PRN Seroquel      #Depression - Elavil, lexapro  #Orthostatic hypotension - midodrine PRN    Shawna Matthews MD      Supplementary Documentation:     Quality:  DVT Mechanical Prophylaxis:   SCDs, Early ambuation  DVT Pharmacologic Prophylaxis   Medication    enoxaparin (Lovenox) 30 MG/0.3ML SUBQ injection 30 mg                Code Status: DNAR/Selective Treatment  Kirk: External urinary catheter in place    At this point Ms. Ramey is expected to be discharge to: TBD    The 21st Century Cures Act makes medical notes like these available to patients in the interest of transparency. Please be advised this is a medical document. Medical documents are intended to carry relevant information, facts as evident, and the clinical opinion of the practitioner. The medical note is intended as peer to peer communication and may appear blunt or direct. It is written in medical language and may contain abbreviations or verbiage that are unfamiliar.             **Certification      PHYSICIAN Certification of Need for Inpatient Hospitalization - Initial Certification    Patient will require inpatient services that will reasonably be expected to span two midnight's based on the clinical documentation in H+P.   Based on patients current state of illness, I anticipate that, after discharge, patient will require TBD.

## 2024-03-31 NOTE — PLAN OF CARE
Patient alert and oriented x4. VSS on RA. Pain controlled with PO PRN pain meds. Denies n/t. X3 aquacels to right hip. First aquacel >50% saturated, ortho PA paged, per ortho change first Aquacel. Aquacel changed and CDI. SCDs in place, ankle pumps encouraged, IS encouraged. Pt up x2 to the chair with the brett steady. Tolerating diet, no c/o n/v. Pt incontinent, voiding freely with purewick in place.     Plan: Discharge to [facility] when cleared by all services

## 2024-03-31 NOTE — PLAN OF CARE
Alert and oriented x3, forgetful at times. VSS. On 1L NC. . Telemetry monitoring. Voiding via purewick. SCDs on BLE. Tolerating diet. Pt rating severe pain PRN medication given. Aquacel dressings x3 to right hip, C/D/I. Max assist to chair. Plan is dc to [facility] tomorrow.

## 2024-04-01 ENCOUNTER — APPOINTMENT (OUTPATIENT)
Dept: MRI IMAGING | Facility: HOSPITAL | Age: 89
End: 2024-04-01
Attending: HOSPITALIST
Payer: MEDICARE

## 2024-04-01 LAB
ALBUMIN SERPL-MCNC: 2 G/DL (ref 3.4–5)
ALBUMIN/GLOB SERPL: 0.6 {RATIO} (ref 1–2)
ALP LIVER SERPL-CCNC: 63 U/L
ALT SERPL-CCNC: 16 U/L
ANION GAP SERPL CALC-SCNC: 3 MMOL/L (ref 0–18)
AST SERPL-CCNC: 23 U/L (ref 15–37)
BASOPHILS # BLD AUTO: 0.04 X10(3) UL (ref 0–0.2)
BASOPHILS NFR BLD AUTO: 0.3 %
BILIRUB SERPL-MCNC: 0.6 MG/DL (ref 0.1–2)
BUN BLD-MCNC: 14 MG/DL (ref 9–23)
CALCIUM BLD-MCNC: 8 MG/DL (ref 8.5–10.1)
CHLORIDE SERPL-SCNC: 104 MMOL/L (ref 98–112)
CO2 SERPL-SCNC: 30 MMOL/L (ref 21–32)
CREAT BLD-MCNC: 0.76 MG/DL
EGFRCR SERPLBLD CKD-EPI 2021: 75 ML/MIN/1.73M2 (ref 60–?)
EOSINOPHIL # BLD AUTO: 0.6 X10(3) UL (ref 0–0.7)
EOSINOPHIL NFR BLD AUTO: 4.8 %
ERYTHROCYTE [DISTWIDTH] IN BLOOD BY AUTOMATED COUNT: 14.9 %
ERYTHROCYTE [DISTWIDTH] IN BLOOD BY AUTOMATED COUNT: 14.9 %
GLOBULIN PLAS-MCNC: 3.1 G/DL (ref 2.8–4.4)
GLUCOSE BLD-MCNC: 119 MG/DL (ref 70–99)
HCT VFR BLD AUTO: 27.3 %
HCT VFR BLD AUTO: 27.3 %
HGB BLD-MCNC: 8.7 G/DL
HGB BLD-MCNC: 8.7 G/DL
IMM GRANULOCYTES # BLD AUTO: 0.16 X10(3) UL (ref 0–1)
IMM GRANULOCYTES NFR BLD: 1.3 %
LYMPHOCYTES # BLD AUTO: 2.25 X10(3) UL (ref 1–4)
LYMPHOCYTES NFR BLD AUTO: 18.1 %
MCH RBC QN AUTO: 30.5 PG (ref 26–34)
MCH RBC QN AUTO: 30.5 PG (ref 26–34)
MCHC RBC AUTO-ENTMCNC: 31.9 G/DL (ref 31–37)
MCHC RBC AUTO-ENTMCNC: 31.9 G/DL (ref 31–37)
MCV RBC AUTO: 95.8 FL
MCV RBC AUTO: 95.8 FL
MONOCYTES # BLD AUTO: 0.93 X10(3) UL (ref 0.1–1)
MONOCYTES NFR BLD AUTO: 7.5 %
NEUTROPHILS # BLD AUTO: 8.47 X10 (3) UL (ref 1.5–7.7)
NEUTROPHILS # BLD AUTO: 8.47 X10(3) UL (ref 1.5–7.7)
NEUTROPHILS NFR BLD AUTO: 68 %
OSMOLALITY SERPL CALC.SUM OF ELEC: 286 MOSM/KG (ref 275–295)
PLATELET # BLD AUTO: 209 10(3)UL (ref 150–450)
PLATELET # BLD AUTO: 209 10(3)UL (ref 150–450)
POTASSIUM SERPL-SCNC: 4.1 MMOL/L (ref 3.5–5.1)
PROT SERPL-MCNC: 5.1 G/DL (ref 6.4–8.2)
RBC # BLD AUTO: 2.85 X10(6)UL
RBC # BLD AUTO: 2.85 X10(6)UL
SODIUM SERPL-SCNC: 137 MMOL/L (ref 136–145)
WBC # BLD AUTO: 12.5 X10(3) UL (ref 4–11)
WBC # BLD AUTO: 12.5 X10(3) UL (ref 4–11)

## 2024-04-01 PROCEDURE — 99239 HOSP IP/OBS DSCHRG MGMT >30: CPT | Performed by: HOSPITALIST

## 2024-04-01 PROCEDURE — 72146 MRI CHEST SPINE W/O DYE: CPT | Performed by: HOSPITALIST

## 2024-04-01 RX ORDER — ENOXAPARIN SODIUM 100 MG/ML
40 INJECTION SUBCUTANEOUS DAILY
Status: DISCONTINUED | OUTPATIENT
Start: 2024-04-01 | End: 2024-04-04

## 2024-04-01 RX ORDER — ENOXAPARIN SODIUM 100 MG/ML
40 INJECTION SUBCUTANEOUS DAILY
Qty: 14 EACH | Refills: 0 | Status: SHIPPED | OUTPATIENT
Start: 2024-04-02

## 2024-04-01 RX ORDER — POLYETHYLENE GLYCOL 3350 17 G/17G
17 POWDER, FOR SOLUTION ORAL DAILY
Qty: 14 EACH | Refills: 0 | Status: SHIPPED | OUTPATIENT
Start: 2024-04-02

## 2024-04-01 NOTE — DISCHARGE INSTRUCTIONS
Norco prn pain   lovenox subcutaneous for dvt ppx X 2 weeks then start Aspirin  Continue PT/OT, fall precautions.    Hip Fracture Discharge Instructions    Activity:  Weight bearing as tolerated (WBAT) ? you may put as much of your weight on your leg as you can tolerate. This means you may use a walker, crutches, cane- or no device at all to get around.      Bathing  No tub baths, pools, or saunas until cleared by surgeon (about 4-6 weeks because it takes that long for the incision(s) on the skin to heal and be a barrier to prevent infection.)    When allowed to shower:  AQUACEL dressing is waterproof and does not require being covered before showering.  Pat dressing(s) and surrounding skin dry after shower.   There may be one incision or a few that have a dressing.                                      AQUACEL           MEDIPORE/COVERLET           DRY STERILE GAUZE                                                                                                                         MEDIPORE/COVERLET dressing and dry sterile gauze are NOT waterproof and REQUIRE being covered with a waterproof barrier to keep the dressing and incision dry.  SARAN WRAP, GLAD WRAP, PRESS N SEAL WORK REALLY WELL BUT ANY PLASTIC WRAP WILL DO.  Do not wash incision.   Remove entire wrapping and old dressing (if Medipore/coverlet or gauze) after showering. Pat dry with a CLEAN TOWEL if necessary and cover incision with new Medipore/coverlet or gauze.    Incision care/Dressing changes  Wash hands before and after dressing changes.  There may be one or a few dressings on the hip/leg    FOR MEDIPORE/COVERLET DRESSINGS:  Change dressing daily using Medipore/coverlet once Aquacel (waterproof) dressing is removed (which is about 7 days after surgery). Patient should be standing or lying flat so dressing goes on smoothly.  (This dressing needed for hip patients because of location of incision-don’t want contamination from bathroom use)  FOR DRY  STERILE GAUZE DRESSINGS:   Change dressing daily using dry sterile gauze and paper tape.  Watch ALL incision for any redness, drainage, increased warmth or opening of the incision.   Call surgeon if you notice any of these.  No lotions or ointments on or near incision(s).                             DRY STERILE GAUZE                         MEDIPORE /COVERLET    Continue dressing changes until your first visit with your surgeon’s office.  There could be a small amount of redness around the staples or incision; this is normal.  Watch for increased redness, warmth, any odor, increased drainage or opening of the incision. A little clear yellow or blood tinged drainage is normal up to 2 weeks after surgery but it should be less every day until it stops.  Call physician if you notice any concerning changes.  Sutures/staples will be removed at first office visit (10 days- 3 weeks).       Driving  Do not drive until cleared by surgeon. Possibly six weeks after surgery. Discuss this at follow-up office visit.   Not allowed while taking narcotic pain medication or muscle relaxants.    Sex  Usually allowed after six weeks - check with surgeon at your office visit.    Return to work  Usually allowed after six weeks. Discuss specific work activities with your surgeon.    Restrictions  Follow instructions provided by physical therapy    No smoking  Avoid smoking. It is known to cause breathing problems and can decrease the rate of healing.                      Medication  Anticoagulants = blood thinners (Xarelto, Eliquis, LOVENOX, Coumadin or Aspirin)  Pill or shot form depending on what your physician orders.   IF placed on Coumadin, you may also need lab work done for monitoring.  You will bleed easier and bruise easier while on these medications.   Usually you will be on a blood thinner for about 4-5 weeks after surgery.  Contact your physician if you have signs of bruising, nose bleeds or blood in your urine. Use electric  razors and soft toothbrushes only.  Do not take NSAIDS (non-steroidal anti-inflammatory medications) while taking blood thinners unless ordered by your surgeon.  Review anticoagulant education information sheet provided.    Discomfort  Surgical discomfort is normal for one to two months.  Have realistic goals and keep a positive outlook.  Keep pain manageable; pain should not disrupt your sleep or activities like getting out of bed or walking.  You may need pain medication regularly (every 4-6 hours) the first 2 weeks and then begin to decrease how often you are taking it.  Take pain medication as prescribed with food, especially before therapy, allowing 30-60 minutes to take effect.  Do not drink alcohol while on pain medication.  As you have less discomfort, decrease the amount of pain medication you take. Use plain Tylenol (acetaminophen) for less severe pain.  Some pain medications have Tylenol (acetaminophen) in them such as Norco and Percocet. Do NOT take Tylenol (acetaminophen) within 4 hours of a dose of these medications.  Apply ice or cold therapy to surgical site for 20 minutes at least four times a day, especially after therapy. Be sure there is a thin cloth barrier between skin and ice or cold therapy.  Change position at least every 45 minutes while awake to avoid stiffness or increased discomfort.  Deep breathing and relaxation techniques and distractions can help!  If you focus on something else, you do not experience the pain the same. Take advantage of everything available to your to help control you discomfort.  Contact physician if discomfort does not respond to pain medication.    Body changes  Constipation is common with the use of narcotics.  Eat fiber rich foods and drink plenty of fluids, at least 4-6 glasses of water a day, unless restricted.  To prevent constipation, use stool softeners such as Colace and laxatives such as Miralax, Senakot or Milk of Magnesia while taking laxatives.   An  enema or suppository may be needed if above measures do not work.    Prevention of infection and promotion of healing  Good hand washing is important. Everyone should wash their hands or use hand  as soon as they walk in your house-whether they live there or are visiting.  Keep bed linen/clothing freshly laundered.  Do not allow others or pets to touch your incision.  Avoid people that have colds or the flu.  Your surgeon may recommend that you take antibiotics before you undergo any dental or other invasive surgical procedures after your hip fracture surgery. Speak with your surgeon about this at your post-op office visit.  Continue using incentive spirometry because narcotics make you sleepy so you may not take good deep breaths. We do not want you to get pneumonia.     Post op Office visits  Schedule 10 days to 3 weeks after surgery WITH SURGEON’s office.  Additional visits may need to be scheduled. Your physician will discuss this at first post-op office visit.  Schedule outpatient physical therapy per your surgeon’s orders.  Schedule one week follow up after surgery WITH PRIMARY CARE PHYSICIAN; review your medications over last 6 months.  Your body gets stressed by surgery and that stress can affect all your other health issues (such as high blood pressure, diabetes, CHF, afib, and asthma just to name a few).  It is much better to catch developing problems and prevent them from becoming larger ones.  MARILIN HOSE - IF ordered by your surgeon, wear these during the day and off at night.  Surgeon will tell you when you don’t need them anymore.    Notify your surgeon if you notice any of the following signs  Separation of incision line.  Increased redness, swelling, or warmth of skin around incision.  Increased or foul smelling drainage from incision  Red streaks on skin near incision.  Temperature >100.4F.  Increased pain at incision not relieved by pain medication.    Signs of Possible Hip Dislocation IF  Total Hip Replacement or Madhu-arthroplasty Done  Increased severe leg or groin pain  Turning in or out of surgical leg that is new  Increased numbness, tingling to leg  Inability to walk or put weight on your surgical leg  Signs of blood clot  Pain, excessive tenderness, redness, or swelling in leg or calf (other than incision site).  Call physician and await their directions.    Go directly to the ER or CALL 911 if you:  become short of breath  have chest pain  cough up blood  have unexplained anxiety with breathing     Traveling and Handicapped parking  Check with you surgeon when you are allowed to travel so you don’t set yourself up for greater chance of complications.  If traveling by car, get out to stretch every 2 hours.  This helps prevent stiffness.  You may need to do this any time you travel for the first year after surgery.  If traveling by plane, BEFORE you get into a security line, let them know that you had your hip replaced, as you will most likely set off the metal detector. The doctors no longer provide an identification card for this as they are easily copied. ALSO request a wheelchair the first year to board and get off a plane…this aids in priority seating and you should sit on the aisle or at the bulkhead where you can easily stretch your legs and get up to walk up and down the aisles…this helps prevent blood clots and stiffness.  TEMPORARY HANDICAP PARKING APPLICATION  (good for 3-6 months)  - At Surgeon or PCP visit, request they fill out their part of the form. You fill our your portion, then go to Department of Motor Vehicles. Some [facility] offices provide the same service. ([place] and [place] have this service; if you live in another [facility], you may check with them as well). You need space to open car doors to position yourself properly with walker to get in and out of your car safely; some parking spaces are  practically on top of each other and do not give you enough  room.     SPECIAL  INSTRUCTIONS:   Spanda- hip replacement(single layer compression sleeve):     All patients should wear the compression sleeve until first follow up visit to surgeon’s office (about 2-3 weeks) and then check with surgeon if need to continue use.  Take off to shower. Best to keep on as much as possible, even at night, except when washing out.  Wash with mild soap and water; DRIP dry overnight- put back on before getting up for the day.  Use one long tube on the calf.   It should end up just below the back of the knee and the other end on the ball of the foot to expose the toes.   Makes sure it is NOT bunched up anywhere.  IF the Spanda- feels TOO tight, then REMOVE it and call your surgeon to let them know.

## 2024-04-01 NOTE — PROGRESS NOTES
[facility]   part of [facility]     Hospitalist Progress Note     Valeria Ramey Patient Status:  Inpatient    1934 MRN [id number]   Location [facility] 4SW-A Attending Kesha Torres,    Hosp Day # 5 PCP JOSIAH VASQUEZ MD     Chief Complaint: Fall    Subjective:     No acute events, pain controlled, MRI T spine today.    Objective:    Review of Systems:   A comprehensive review of systems was completed; pertinent positive and negatives stated in subjective.    Vital signs:  Temp:  [97.9 °F (36.6 °C)-98.6 °F (37 °C)] 98.6 °F (37 °C)  Pulse:  [] 83  Resp:  [16-25] 16  BP: (102-123)/(45-56) 110/53  SpO2:  [94 %-98 %] 95 %    Physical Exam:    General: No acute distress, a little confused today   Respiratory: No wheezes, no rhonchi  Cardiovascular: S1, S2, regular rate and rhythm  Abdomen: Soft, Non-tender, non-distended, positive bowel sounds  Neuro: No new focal deficits.   Extremities: No edema      Diagnostic Data:    Labs:  Recent Labs   Lab 24  0214 24  0226 24  0958 24  0423 24   WBC 23.4*  --   --  17.5* 14.9* 13.6* 12.5*  12.5*   HGB 7.5*  --  10.9* 10.1* 9.5* 8.8* 8.7*  8.7*   MCV 98.8  --   --  87.7 96.2 99.0 95.8  95.8   .0  --   --  149.0* 147.0* 165.0 209.0  209.0   INR  --  1.17  --   --   --   --   --        Recent Labs   Lab 24   GLU 98 96 119*   BUN 14 15 14   CREATSERUM 0.69 0.84 0.76   CA 7.9* 8.0* 8.0*   ALB 2.1* 2.1* 2.0*    134* 137   K 4.5 4.4 4.1    107 104   CO2 19.0* 24.0 30.0   ALKPHO 55 60 63   AST 23 22 23   ALT 14 15 16   BILT 0.6 0.6 0.6   TP 5.2* 5.0* 5.1*       Estimated Creatinine Clearance: 36 mL/min (based on SCr of 0.76 mg/dL).    No results for input(s): \"TROP\", \"TROPHS\", \"CK\" in the last 168 hours.    Recent Labs   Lab 24  0226   PTP 15.0*   INR 1.17                  Microbiology    Hospital Encounter on 24    1. Blood Culture     Status: None (Preliminary result)    Collection Time: 03/28/24  3:25 AM    Specimen: Blood,peripheral   Result Value Ref Range    Blood Culture Result No Growth 4 Days N/A   2. Urine Culture, Routine     Status: Abnormal    Collection Time: 03/27/24  3:58 PM    Specimen: Urine, clean catch   Result Value Ref Range    Urine Culture >100,000 CFU/ML Escherichia coli (A) N/A       Susceptibility    Escherichia coli -  (no method available)     Ampicillin 4 Sensitive      Cefazolin <=4 Sensitive      Ciprofloxacin <=0.25 Sensitive      Gentamicin <=1 Sensitive      Meropenem <=0.25 Sensitive      Levofloxacin <=0.12 Sensitive      Nitrofurantoin <=16 Sensitive      Piperacillin + Tazobactam <=4 Sensitive      Trimethoprim/Sulfa <=20 Sensitive          Imaging: Reviewed in Epic.    Medications:    enoxaparin  40 mg Subcutaneous Daily    cephalexin  500 mg Oral Q8H KAR    polyethylene glycol (PEG 3350)  17 g Oral Daily    melatonin  10 mg Oral Nightly    donepezil  5 mg Oral Nightly    sennosides  17.2 mg Oral Nightly    multivitamin  1 tablet Oral Daily    atorvastatin  10 mg Oral Daily    escitalopram  10 mg Oral Daily    dorzolamide-timolol  1 drop Both Eyes BID    latanoprost  1 drop Both Eyes Nightly    amitriptyline  25 mg Oral Nightly       Assessment & Plan:      #Right hip fracture s/p Right femur intramedullary nailing 3/27  -pain management per Ortho  -PT/OT    #Back pain  Xrays reviewed, possible T10 compression fracture  MRI T spine pending    #Hemorrhagic Shock 2/2 blood loss, doubt septic etiology   -monitor for any ongoing bleeding  -PRBC transfusion support as needed, Hgb now stable overnight  -Vasopressors off  -Midodrine, taking PRN PTA  -Blood cultures     #E coli UTI, on Keflex, 3 more days    #Acute Blood Loss Anemia  -as above  -stable overnight    #Hx of essential HTN  -hold Coreg, patient reports she was prescribed but never took     #Dementia with hospital Delirium    -Donepizil  -Nightly PRN Seroquel     #Depression - Elavil, lexapro  #Orthostatic hypotension - midodrine PRN    Shawna Matthews MD      Supplementary Documentation:     Quality:  DVT Mechanical Prophylaxis:   SCDs, Early ambuation  DVT Pharmacologic Prophylaxis   Medication    enoxaparin (Lovenox) 40 MG/0.4ML SUBQ injection 40 mg         DVT Pharmacologic prophylaxis: Aspirin 81 mg      Code Status: DNAR/Selective Treatment  Kirk: External urinary catheter in place    At this point Ms. Ramey is expected to be discharge to: TBD    The 21st Century Cures Act makes medical notes like these available to patients in the interest of transparency. Please be advised this is a medical document. Medical documents are intended to carry relevant information, facts as evident, and the clinical opinion of the practitioner. The medical note is intended as peer to peer communication and may appear blunt or direct. It is written in medical language and may contain abbreviations or verbiage that are unfamiliar.             **Certification      PHYSICIAN Certification of Need for Inpatient Hospitalization - Initial Certification    Patient will require inpatient services that will reasonably be expected to span two midnight's based on the clinical documentation in H+P.   Based on patients current state of illness, I anticipate that, after discharge, patient will require TBD.

## 2024-04-01 NOTE — PHYSICAL THERAPY NOTE
PHYSICAL THERAPY TREATMENT NOTE - INPATIENT    Room Number: 386/386-A     Session: 2/10     Number of Visits to Meet Established Goals: 10    Presenting Problem: R femur fracture  Co-Morbidities : dementia    PHYSICAL THERAPY MEDICAL/SOCIAL HISTORY  History related to current admission: Patient is a 89 year old female admitted on 3/27/2024 from home for fall.  Pt diagnosed with R femur fracture, s/p IM nailing 3/27/24.     HOME SITUATION  Type of Home: Assisted living facility   Home Layout: One level     Lives With: Alone     Prior Level of [place]: Pt reports ambulating to dining room for meals with RW.  Pt is able to get self out of bed in the morning.     ASSESSMENT   Patient demonstrates fair progress this session, goals  remain in progress.    Patient continues to function below baseline with bed mobility, transfers, gait, maintaining seated position, and standing prolonged periods.  Contributing factors to remaining limitations include decreased functional strength, decreased endurance/aerobic capacity, pain, impaired static and dynamic standing balance, impaired coordination, impaired motor planning, decreased muscular endurance, and limited RLE ROM.  Next session anticipate patient to progress bed mobility, transfers, and gait.  Physical Therapy will continue to follow patient for duration of hospitalization.    Patient continues to benefit from continued skilled PT services: to promote return to prior level of function and safety with continuous assistance and gradual rehabilitative therapy .    PLAN  PT Treatment Plan: Bed mobility;Body mechanics;Coordination;Endurance;Energy conservation;Patient education;Family education;Gait training;Neuromuscular re-educate;Range of motion;Strengthening;Stoop training;Transfer training;Balance training;Stair training  Rehab Potential : Good  Frequency (Obs): 3-5x/week    CURRENT GOALS      Goal #1 Patient is able to demonstrate supine - sit EOB @ level:  supervision      Goal #2 Patient is able to demonstrate transfers Sit to/from Stand at assistance level: supervision      Goal #3 Patient is able to ambulate 50 feet with assist device: walker - rolling at assistance level: supervision      Goal #4     Goal #5     Goal #6     Goal Comments: Goals established on 3/28/2024  4/1/2024 all goals ongoing    SUBJECTIVE  \"Scratch my back!\"    OBJECTIVE  Precautions: Bed/chair alarm    WEIGHT BEARING RESTRICTION  Weight Bearing Restriction: R lower extremity        R Lower Extremity: Weight Bearing as Tolerated       PAIN ASSESSMENT   Rating: Unable to rate  Location: back and right thigh/hip  Management Techniques: Activity promotion;Body mechanics;Repositioning    BALANCE                                                                                                                       Static Sitting: Fair  Dynamic Sitting: Fair -           Static Standing: Poor  Dynamic Standing: Poor -    ACTIVITY TOLERANCE                         O2 WALK         AM-PAC '6-Clicks' INPATIENT SHORT FORM - BASIC MOBILITY  How much difficulty does the patient currently have...  Patient Difficulty: Turning over in bed (including adjusting bedclothes, sheets and blankets)?: A Lot   Patient Difficulty: Sitting down on and standing up from a chair with arms (e.g., wheelchair, bedside commode, etc.): A Lot   Patient Difficulty: Moving from lying on back to sitting on the side of the bed?: A Lot   How much help from another person does the patient currently need...   Help from Another: Moving to and from a bed to a chair (including a wheelchair)?: Total   Help from Another: Need to walk in hospital room?: Total   Help from Another: Climbing 3-5 steps with a railing?: Total       AM-PAC Score:  Raw Score: 9   Approx Degree of Impairment: 81.38%   Standardized Score (AM-PAC Scale): 30.55   CMS Modifier (G-Code): CM    FUNCTIONAL ABILITY STATUS  Gait Assessment   Functional Mobility/Gait  Assessment  Gait Assistance: Not tested  Distance (ft): 12 inches  Assistive Device: Rolling walker  Pattern: R Foot drag;Shuffle (unable to weight shift to R side to advance LLE)    Skilled Therapy Provided    Bed Mobility:  Rolling: NT   Supine<>Sit: w/ HOB significantly elevated- maxA x  2 to reach sitting EOB, unable to lift RLE to help assist transfer off bed   Sit<>Supine: NT     Transfer Mobility:  Sit<>Stand: w/ height of bed elevated modA x 2 to RW- rocking bkwds/fwds for momentum    Stand<>Sit: modA x 2    Gait: NT    Therapist's Comments:   Patient presents resting in bed. Two daughters present in room. Family reports she didn't get much sleep last night. Pt reports pain in back at rest, noticeable pain behaviors noted with movement of RLE. Educated on log roll technique for protection of spine, but due to RLE pain and inability to move on her own head of bed significantly elevated. MaxA x 2 to reach static sitting EOB, maxA to scoot forward EOB as well. Pt unable to assist moving RLE off bed during transfer. Once sitting EOB, SBA for static sitting balance. Elevated height of bed a little bit for ease of sit to stand transfers. Sit to stand transfer x 2 trials- modA x 2 to RW. On second attempt, a few lateral steps to left side at maxA, significant difficulty offloading to advance LE for lateral stepping. Unable to fully reach bedside chair, maxA x 2 for quick pivot into the chair. Up in chair at end of session. Reporting nausea, BP taken 150/71. Pt intermittently drowsy throughout session and closing her eyes. Daughters asking her questions throughout with inconsistent responses.     Patient End of Session: Up in chair;Needs met;Call light within reach;RN aware of session/findings;All patient questions and concerns addressed;SCDs in place;Alarm set;Family present;Discussed recommendations with /    PT Session Time: 27 minutes  Therapeutic Activity: 25 minutes

## 2024-04-01 NOTE — PROGRESS NOTES
[facility]   part of [facility]     Hospitalist Progress Note     Valeria Ramey Patient Status:  Inpatient    1934 MRN [id number]   Location [facility] 4SW-A Attending Dr. Matthews   Hosp Day # 5 PCP JOSIAH VASQUEZ MD     Chief Complaint: Fall    Subjective:  No acute events, pain controlled.  Dtrs asking about timing for MRI back.  Report past fall and recurrent issues with back pain.  They have chosen [facility].      Objective:    Review of Systems:  comprehensive review of systems was completed; pertinent positive and negatives stated in subjective.    Vital signs:  Temp:  [97.9 °F (36.6 °C)-98.6 °F (37 °C)] 98.6 °F (37 °C)  Pulse:  [] 72  Resp:  [16-25] 20  BP: (102-120)/(44-56) 118/44  SpO2:  [94 %-98 %] 96 %    Physical Exam:    General: No acute distress, awake, up in chair, answering questions  Respiratory: No wheezes, no rhonchi on RA  Cardiovascular: S1, S2, RRR  Abdomen: Soft, Non-tender, non-distended  Neuro: No new focal deficits.   Extremities: No edema      Diagnostic Data:    Labs:  Recent Labs   Lab 24  0214 24  0226 24  0958 24   WBC 23.4*  --   --  17.5* 14.9* 13.6* 12.5*  12.5*   HGB 7.5*  --  10.9* 10.1* 9.5* 8.8* 8.7*  8.7*   MCV 98.8  --   --  87.7 96.2 99.0 95.8  95.8   .0  --   --  149.0* 147.0* 165.0 209.0  209.0   INR  --  1.17  --   --   --   --   --        Recent Labs   Lab 240 24  044   GLU 98 96 119*   BUN 14 15 14   CREATSERUM 0.69 0.84 0.76   CA 7.9* 8.0* 8.0*   ALB 2.1* 2.1* 2.0*    134* 137   K 4.5 4.4 4.1    107 104   CO2 19.0* 24.0 30.0   ALKPHO 55 60 63   AST 23 22 23   ALT 14 15 16   BILT 0.6 0.6 0.6   TP 5.2* 5.0* 5.1*       Estimated Creatinine Clearance: 36 mL/min (based on SCr of 0.76 mg/dL).    No results for input(s): \"TROP\", \"TROPHS\", \"CK\" in the last 168 hours.    Recent Labs   Lab 24  0226   PTP 15.0*    INR 1.17                  Microbiology    Hospital Encounter on 03/27/24   1. Blood Culture     Status: None (Preliminary result)    Collection Time: 03/28/24  3:25 AM    Specimen: Blood,peripheral   Result Value Ref Range    Blood Culture Result No Growth 4 Days N/A   2. Urine Culture, Routine     Status: Abnormal    Collection Time: 03/27/24  3:58 PM    Specimen: Urine, clean catch   Result Value Ref Range    Urine Culture >100,000 CFU/ML Escherichia coli (A) N/A       Susceptibility    Escherichia coli -  (no method available)     Ampicillin 4 Sensitive      Cefazolin <=4 Sensitive      Ciprofloxacin <=0.25 Sensitive      Gentamicin <=1 Sensitive      Meropenem <=0.25 Sensitive      Levofloxacin <=0.12 Sensitive      Nitrofurantoin <=16 Sensitive      Piperacillin + Tazobactam <=4 Sensitive      Trimethoprim/Sulfa <=20 Sensitive          Imaging: Reviewed in Epic.    Medications:    enoxaparin  40 mg Subcutaneous Daily    cephalexin  500 mg Oral Q8H KAR    polyethylene glycol (PEG 3350)  17 g Oral Daily    melatonin  10 mg Oral Nightly    donepezil  5 mg Oral Nightly    sennosides  17.2 mg Oral Nightly    multivitamin  1 tablet Oral Daily    atorvastatin  10 mg Oral Daily    escitalopram  10 mg Oral Daily    dorzolamide-timolol  1 drop Both Eyes BID    latanoprost  1 drop Both Eyes Nightly    amitriptyline  25 mg Oral Nightly       Assessment & Plan:      #Right hip fx s/p Right femur intramedullary nailing 3/27 -norco prn, PT/OT per Ortho  #Hemorrhagic Shock 2/2 blood loss, doubt septic etiology, resolved  #Acute Blood Loss Anemia -s/p PRBCs 2 units, hgb stable  #E coli UTI - Keflex Day 5, Blood cultures NGTD  #Essential HTN - hold Coreg, patient reports she was prescribed but never took   #Dementia with hospital Delirium improved-Donepezil, PRN Seroquel   #Depression - Elavil, lexapro  #Orthostatic hypotension - midodrine PRN  #Back pain after fall, pending MRI thoracic spine    Case d/w pt, RN, MD.  Dc  planning.  Await MRI.  ERIN Gilman  12:12 PM       Supplementary Documentation:     Quality:  DVT Mechanical Prophylaxis:   SCDs, Early ambuation  DVT Pharmacologic Prophylaxis   Medication    enoxaparin (Lovenox) 40 MG/0.4ML SUBQ injection 40 mg         DVT Pharmacologic prophylaxis: Aspirin 81 mg      Code Status: DNAR/Selective Treatment  Kirk: External urinary catheter in place    At this point Ms. Ramey is expected to be discharge to: TBD    The 21st Century Cures Act makes medical notes like these available to patients in the interest of transparency. Please be advised this is a medical document. Medical documents are intended to carry relevant information, facts as evident, and the clinical opinion of the practitioner. The medical note is intended as peer to peer communication and may appear blunt or direct. It is written in medical language and may contain abbreviations or verbiage that are unfamiliar.             **Certification      PHYSICIAN Certification of Need for Inpatient Hospitalization - Initial Certification    Patient will require inpatient services that will reasonably be expected to span two midnight's based on the clinical documentation in H+P.   Based on patients current state of illness, I anticipate that, after discharge, patient will require TBD.

## 2024-04-01 NOTE — DISCHARGE SUMMARY
[facility] HOSPITALIST  DISCHARGE SUMMARY     Valeria Ramey Patient Status:  Inpatient    1934 MRN [id number]   Location [facility] 3SW-A Attending Kesha Torres, DO   Hosp Day # 7 PCP JOSIAH VASQUEZ MD     Date of Admission: 3/27/2024  Date of Discharge:   2024     Discharge Disposition: [facility]    Discharge Diagnosis:  #Right hip fx s/p Right femur intramedullary nailing 3/27   #Left sided pleuritic chest pain/chest wall discomfort possibly related to below  #T8 Acute compression fx s/p Kyphoplasty 4/3  #Prior compression fx of T10-T11, prior kyphoplasty   #Hemorrhagic Shock 2/2 blood loss, doubt septic etiology, resolved  #Acute Blood Loss Anemia -s/p PRBCs 2 units, stable  #E coli UTI, resolved  #Essential HTN   #Dementia with hospital Delirium improved  #Depression   #Orthostatic hypotension     History of Present Illness: Valeria Ramey is a 89 year old female with past medical history of hypertension, depression, polyarthritis who presents for mechanical fall.  History obtained from daughter at bedside.  Patient notes that she was in normal state of health today when she was attempting to move around at home and assisted living and got tangled around her feet.  Fell on the right side, and had injury to the hip.  Daughter notes that she has been having multiple falls when she was living at home as well.  Patient currently denies any other nausea, vomiting, headaches, vision changes, abdominal pain, chest pain    Brief Synopsis: Patient is a 89-year-old female who presented after mechanical fall.  She was found to have right hip fracture and was taken for right femur intramedullary nailing on 3/27.  She required pressor support postprocedure and PRBCs for anemia.  We continue to monitor serial hemoglobin and patient able to wean off pressor support.  She was started on antibiotics for UTI and culture growing E. coli.  No growth to date on blood cultures.  Continue on Norco as needed for  pain and continue to work with PT OT.  She reported back pain occasionally radiating around the chest and x-ray showing T8 compression fracture indeterminate age.  Follow-up MRI showing acute compression fracture at T8 and prior kyphoplasty at T10, T11.  IR consulted for kyphoplasty.  Patient developed worsening chest wall/pleuritic discomfort and CTA of the chest ruled out PE.  She was cleared to proceed with kyphoplasty and underwent procedure without event.   She completed antibiotics for UTI during the stay.  Social work assist with discharge planning for subacute rehab.  Patient discharged to [facility]"" and discharged once cleared by all consultants.  She was instructed to follow-up with Ortho in 2 weeks, primary care in 1 week.     Lace+ Score: 71  59-90 High Risk  29-58 Medium Risk  0-28   Low Risk       TCM Follow-Up Recommendation:  LACE > 58: High Risk of readmission after discharge from the hospital.      Procedures during hospitalization:   Date of Operation:  3/27/2024  Preoperative Diagnosis:  RIGHT INTERTROCHANTERIC FEMUR FRACTURE  Postoperative Diagnosis:  RIGHT INTERTROCHANTERIC FEMUR FRACTURE  Procedure Performed:  RIGHT FEMUR INTRAMEDULLARY NAILING  Surgeon:  KM KIRBY M.D.      4/3 IR Kyphoplasty T8  Pre-Procedure Diagnosis:  painful osteoporotic compression fracture  Post-Procedure Diagnosis: same  Anesthesia:  Local and MAC  Findings:  good cement fill anterior 2/3 T8 vertebral body, no extravasation      Consultants: pulmCC, ortho, IR         Discharge Medications        START taking these medications        Instructions Prescription details   aspirin 81 MG Tbec  Replaces: aspirin 325 MG Tabs      Take 1 tablet (81 mg total) by mouth 2 (two) times daily. Start after lovenox regiment has been completed. Take for 4 weeks.   Quantity: 56 tablet  Refills: 0     enoxaparin 40 MG/0.4ML Sosy  Commonly known as: Lovenox      Inject 0.4 mL (40 mg total) into the skin daily. Take for 2 weeks after  surgery.   Quantity: 14 each  Refills: 0     HYDROcodone-acetaminophen 5-325 MG Tabs  Commonly known as: Norco      Take 1 tablet by mouth every 4 (four) hours as needed.   Quantity: 24 tablet  Refills: 0     Polyethylene Glycol 3350 17 g Pack  Commonly known as: MIRALAX      Take 17 g by mouth daily.   Quantity: 14 each  Refills: 0     Sennosides 17.2 MG Tabs      Take 1 tablet (17.2 mg total) by mouth nightly as needed (constipation).   Quantity: 14 tablet  Refills: 0            CHANGE how you take these medications        Instructions Prescription details   dorzolamide-timolol 2-0.5 % Soln  Commonly known as: Cosopt  What changed:   when to take this  Another medication with the same name was removed. Continue taking this medication, and follow the directions you see here.      Place 1 drop into both eyes 2 (two) times daily.   Quantity: 1 each  Refills: 0            CONTINUE taking these medications        Instructions Prescription details   atorvastatin 10 MG Tabs  Commonly known as: Lipitor      Take 1 tablet (10 mg total) by mouth daily.   Refills: 0     CALCIUM 600-D OR      Take by mouth 2 (two) times daily.   Refills: 0     citalopram 20 MG Tabs  Commonly known as: CeleXA      Take 1 tablet (20 mg total) by mouth daily.   Refills: 0     CRANBERRY EXTRACT OR      Take 1 capsule by mouth 2 (two) times daily.   Refills: 0     donepezil 5 MG Tabs  Commonly known as: Aricept      Take 1 tablet (5 mg total) by mouth at bedtime.   Refills: 0     latanoprost 0.005 % Soln  Commonly known as: Xalatan      Place 1 drop into both eyes daily.   Refills: 0     Loratadine 10 MG Caps      Take 10 mg by mouth daily as needed.   Refills: 0     melatonin 1 MG Tabs      Take 1.5 tablets (1.5 mg total) by mouth daily.   Refills: 0     midodrine 5 MG Tabs  Commonly known as: ProAmatine      Take 1 tablet (5 mg total) by mouth daily as needed (If systolic Blood pressure less than 120).   Refills: 0            STOP taking these  medications      ALPRAZolam 0.25 MG Tabs  Commonly known as: Xanax        aspirin 325 MG Tabs  Replaced by: aspirin 81 MG Tbec        carvedilol 3.125 MG Tabs  Commonly known as: Coreg                  Where to Get Your Medications        These medications were sent to [facility] [address] [place], [place] - [address] [phone], [phone]  [address] [place] [address]      Phone: [phone]   aspirin 81 MG Tbec  dorzolamide-timolol 2-0.5 % Soln       Please  your prescriptions at the location directed by your doctor or nurse    Bring a paper prescription for each of these medications  enoxaparin 40 MG/0.4ML Sosy  HYDROcodone-acetaminophen 5-325 MG Tabs  Polyethylene Glycol 3350 17 g Pack  Sennosides 17.2 MG Tabs         [place]    Follow-up appointment:   Jimmy Brown MD  [address] [facility]  [address]  [facility] [address] [phone]    Schedule an appointment as soon as possible for a visit in 1 week(s)      Ruben Marley MD  [address]  [address]  [facility] [address] [phone]    Schedule an appointment as soon as possible for a visit in 2 week(s)      Appointments for Next 30 Days 4/3/2024 - 5/3/2024        Date Arrival Time Visit Type Length Department Provider     4/3/2024  2:00 PM  EDW IVS IP IR NEURO SEDATE [6024] 60 min [facility] EH IVS IR     4/3/2024  2:00 PM  EDW IVS IP IR NEURO SEDATE [6024] 60 min [facility] Taj Wooten MD    Patient Instructions:     When you arrive, park in the [facility], then proceed to the ground floor of the [facility] lob and check in at the [facility] registration desk so we know you have arrived (even if you already completed eCheck-in online).     Your physician or physician's representative will follow-up with you and your loved one by phone after you are discharged.    A nurse from the Interventional Suites Department will be calling you prior to your  procedure to perform a health history screening and give you instructions.  The time the nurse assigns you to arrive will be one hour prior to your procedure.  This will allow plenty of time to register and to get you prepared for your procedure.     If you have any questions, please call [phone].  We are available Monday through Friday, 8:00 AM to 5:00 PM.      Location Instructions:     Your appointment is scheduled in the Interventional Suites Department at [facility].&nbsp; The address is [address] [place] [address].&nbsp; To reach Registration, park in the [facility] and enter the doors located on the ground floor.&nbsp; Proceed to Registration, located across from the [facility], to the left of the Information Desk.  A nurse from Interventional Radiology will be calling you the day prior to your procedure with your arrival time.  Masks are optional for all patients and visitors, unless otherwise indicated.                      Vital signs:  Temp:  [97.9 °F (36.6 °C)-98.2 °F (36.8 °C)] 97.9 °F (36.6 °C)  Pulse:  [80-94] 94  Resp:  [15-25] 17  BP: (114-149)/(44-75) 142/75  SpO2:  [92 %-95 %] 95 %    Physical Exam:    General: No acute distress 89 year old female alert and oriented to self/persons, answering questions appropriately  Lungs: clear to auscultation   Cardiovascular: S1, S2 RRR  Abdomen: Soft, nondistended  Extremities: R hip incision w/ dressing    -----------------------------------------------------------------------------------------------  PATIENT DISCHARGE INSTRUCTIONS: See electronic chart    ERIN Herron  3:25 PM     Addendum:    Agree with above note.  Pt. Seen and examined.    Gen: NAD  CVS: s1s2  Resp: CTA  Abd: soft    POC: Discharge to [facility]  Discharge time spent 32 minutes       Pt seen an examined independently. Chart reviewed. Labs and imaging over the last 24 hours have been personally reviewed.  I personally made/approved 100% of the management  plan for this patient and take full responsibility for the patient management.   Note has been reviewed by me and modified as needed.  Exam and Impression/ Recs as noted above.  D/w staff.    Kesha Torres, DO              The 21st Century Cures Act makes medical notes like these available to patients in the interest of transparency. Please be advised this is a medical document. Medical documents are intended to carry relevant information, facts as evident, and the clinical opinion of the practitioner. The medical note is intended as peer to peer communication and may appear blunt or direct. It is written in medical language and may contain abbreviations or verbiage that are unfamiliar.

## 2024-04-01 NOTE — OCCUPATIONAL THERAPY NOTE
OCCUPATIONAL THERAPY TREATMENT NOTE - INPATIENT     Room Number: 386/386-A  Session: 1   Number of Visits to Meet Established Goals: 5    Presenting Problem: 3/27 R femur IM nailing      ASSESSMENT   Patient demonstrates limited progress this session, goals remain in progress.    Patient continues to function below baseline with toileting, lower body dressing, bed mobility, transfers, static standing balance, dynamic standing balance, and functional standing tolerance.   Contributing factors to remaining limitations include decreased functional reach, decreased endurance, pain, and impaired standing balance.  Next session anticipate patient to progress toileting, lower body dressing, bed mobility, transfers, and dynamic standing balance.  Occupational Therapy will continue to follow patient for duration of hospitalization.    Patient continues to benefit from continued skilled OT services: to promote return to prior level of function and safety with continuous assistance and gradual rehabilitative therapy .             History Related to Current Admission: Patient is a 89 year old female admitted on 3/27/2024 with Presenting Problem: 3/27 R femur IM nailing. Co-Morbidities : dementia    HOME SITUATION  Type of Home: Assisted living facility  Home Layout: One level  Lives With: Alone    Toilet and Equipment: Comfort height toilet  Shower/Tub and Equipment: Walk-in shower     Occupation/Status: retired  Hand Dominance: Right     Prior Level of Function: Prior to admission, patient's baseline is Mod I for all ADLs except for min A for showering.    WEIGHT BEARING RESTRICTION  Weight Bearing Restriction: R lower extremity        R Lower Extremity: Weight Bearing as Tolerated       Recommendations for nursing staff:   Transfers: sera-steady  Toileting location: commode    TREATMENT SESSION:  Patient Start of Session: supine  FUNCTIONAL TRANSFER ASSESSMENT  Sit to Stand: Edge of Bed  Edge of Bed: Dependent (mod x2 with  RW)    BED MOBILITY  Rolling: Not Tested  Supine to Sit : Dependent (max x2, HOB elevated)  Scooting: not tested    BALANCE ASSESSMENT  Static Sitting: Supervision  Sitting Bilateral: Contact Guard Assist  Static Standing: Moderate Assist  Standing Bilateral: Dependent    FUNCTIONAL ADL ASSESSMENT  Eating: Modified Independent  Grooming Seated: Not Tested  LB Dressing Seated: Dependent (total for sock management)      ACTIVITY TOLERANCE:                          O2 SATURATIONS  Oxygen Therapy  SPO2% on Room Air at Rest: 92  SPO2% on Oxygen at Rest: 97  At rest oxygen flow (liters per minute): 2    EDUCATION PROVIDED  Patient: Role of Occupational Therapy; Plan of Care; Functional Transfer Techniques; Fall Prevention; Weight Bear Status; Compensatory ADL Techniques  Patient's Response to Education: Verbalized Understanding; Requires Further Education (two daughters also at bedside throughout)      Equipment used: RW  Demonstrates functional use, Would benefit from additional trial      Therapist comments: Patient participatory with encouragement due to fatigue, pain increasing with movement both in R hip and back; educated on spine precautions for comfort due to back pain (awaiting imaging, per RN cleared for therapy in meantime); patient tolerated bed mobility, standing with RW and mod assist x2; initiated side-steps towards HOB and recliner on L side mod assist x2 with RW, but ultimately required max x2 to complete pivot transfer remainder of way to chair in preparation for commode transfer. Supportive daughters at bedside throughout. Will continue to follow.   Patient End of Session: Up in chair;Needs met;Call light within reach;RN aware of session/findings;All patient questions and concerns addressed;SCDs in place;Alarm set;Family present    SUBJECTIVE  \"It hurts all over\"    PAIN ASSESSMENT  Rating: Unable to rate  Location: R hip, back, \"all over\"  Management Techniques: Activity promotion;Body  mechanics;Breathing techniques;Relaxation;Repositioning     OBJECTIVE  Precautions: Bed/chair alarm    AM-PAC ‘6-Clicks’ Inpatient Daily Activity Short Form  -   Putting on and taking off regular lower body clothing?: Total  -   Bathing (including washing, rinsing, drying)?: A Lot  -   Toileting, which includes using toilet, bedpan or urinal? : Total  -   Putting on and taking off regular upper body clothing?: A Little  -   Taking care of personal grooming such as brushing teeth?: A Little  -   Eating meals?: A Little (setup)    AM-PAC Score:  Score: 13  Approx Degree of Impairment: 63.03%  Standardized Score (AM-PAC Scale): 32.03    PLAN  OT Treatment Plan: Balance activities;Energy conservation/work simplification techniques;IADL training;ADL training;Continued evaluation;Compensatory technique education;Equipment eval/education;Patient/Family training;Patient/Family education;Endurance training;UE strengthening/ROM;Functional transfer training  Rehab Potential : Good  Frequency: 3-5x/week    Goals in progress 4/1/24  ADL Goals   Patient will perform grooming: with setup  Patient will perform upper body dressing:  with supervision  Patient will perform lower body dressing:  with mod assist    Functional Transfer Goals  Patient will transfer from supine to sit:  with supervision  Patient will transfer from sit to stand:  with mod assist    OT Session Time: 25 minutes  Therapeutic Activity: 25 minutes

## 2024-04-01 NOTE — PLAN OF CARE
Pt A&Ox4 (forgetful at times), on 2L O2 NC. POD#5. Moderate c/o pain, managed with PRN medication. Dressing sites Intact. Tele (NSR), , SCD's, IS encouraged. Tolerating diet. Voiding via purewick. MRI of Spine to be completed. Plan to DC to [facility] when cleared. POC discussed, pt and family verbalized understanding. No further questions at this time. Call light within reach.

## 2024-04-01 NOTE — PLAN OF CARE
Alert and oriented x 4, forgetful at times. Vitals stable on room air. Moderate pain managed on PO medications. Dressings x3  with scant amount of dry blood. Voiding without difficulty via Purewick. Last BM 03/31. Tolerating regular diet. SCD's on bilaterally. Safety precautions in place. PT/OT, MRI thoracic spine . Plan of care discussed with patient, family. Daughters at bedside.

## 2024-04-02 PROCEDURE — 99233 SBSQ HOSP IP/OBS HIGH 50: CPT | Performed by: HOSPITALIST

## 2024-04-02 RX ORDER — ALPRAZOLAM 0.25 MG/1
0.25 TABLET ORAL 3 TIMES DAILY PRN
Status: DISCONTINUED | OUTPATIENT
Start: 2024-04-02 | End: 2024-04-03

## 2024-04-02 RX ORDER — DIPHENHYDRAMINE HYDROCHLORIDE, ZINC ACETATE 2; .1 G/100G; G/100G
1 CREAM TOPICAL 3 TIMES DAILY PRN
Status: DISCONTINUED | OUTPATIENT
Start: 2024-04-02 | End: 2024-04-04

## 2024-04-02 NOTE — CM/SW NOTE
Met with pt/dtrs at bedside to discuss DC planning.  Pt accepted at [facility] for AC.  IR consulted for possible kyphoplasty for T8 compression fracture.  Pt's dtr stated that they will be touring [facility] and also [facility] today.  They may consider [facility] for AC pending tour and facility bed availability.  Otherwise would plan to go to [facility].  All questions/concerns addressed.  / to remain available for support and/or discharge planning.     Yovana Hardy [facility]  Discharge Planner  [phone]    Addendum:  Received call from pt's dtr/JOSH Scott who confirmed preference for [facility] at DC,  Spoke with Yuni in admissions at [facility] who stated they do have bed availability this week.  [facility] changed as reserved provider in [facility].  Message sent to [facility] to update them.  Await medical clearance for DC.    [facility]  P:[phone]  F:[phone]

## 2024-04-02 NOTE — PROGRESS NOTES
[facility]   part of [facility]     Hospitalist Progress Note     Valeria Ramey Patient Status:  Inpatient    1934 MRN [id number]   Location [facility] 4SW-A Attending Dr. Matthews   Hosp Day # 6 PCP JOSIAH VASQUEZ MD     Chief Complaint: Fall    Subjective:  No new complaints.  Had MRI overnight.  Dtrs at bedside updated.  Given norco for pain overnight.     Objective:    Review of Systems:  comprehensive review of systems was completed; pertinent positive and negatives stated in subjective.    Vital signs:  Temp:  [97.9 °F (36.6 °C)-98.6 °F (37 °C)] 98 °F (36.7 °C)  Pulse:  [] 81  Resp:  [14-23] 15  BP: (117-134)/(44-61) 125/61  SpO2:  [90 %-96 %] 96 %    Physical Exam:    General: No acute distress, 89 year old female awake, comfortable in bed  Respiratory: No wheezes, no rhonchi on RA  Cardiovascular: S1, S2, RRR  Abdomen: Soft, Non-tender, non-distended  Neuro: No new focal deficits.   Extremities: No edema R hip dressings w/ dry blood, ecchymoses of R thigh noted.       Diagnostic Data:    Labs:  Recent Labs   Lab 24  0214 24  0226 24  0958 24   WBC 23.4*  --   --  17.5* 14.9* 13.6* 12.5*  12.5*   HGB 7.5*  --  10.9* 10.1* 9.5* 8.8* 8.7*  8.7*   MCV 98.8  --   --  87.7 96.2 99.0 95.8  95.8   .0  --   --  149.0* 147.0* 165.0 209.0  209.0   INR  --  1.17  --   --   --   --   --        Recent Labs   Lab 24  0440 24  044   GLU 98 96 119*   BUN 14 15 14   CREATSERUM 0.69 0.84 0.76   CA 7.9* 8.0* 8.0*   ALB 2.1* 2.1* 2.0*    134* 137   K 4.5 4.4 4.1    107 104   CO2 19.0* 24.0 30.0   ALKPHO 55 60 63   AST 23 22 23   ALT 14 15 16   BILT 0.6 0.6 0.6   TP 5.2* 5.0* 5.1*       Estimated Creatinine Clearance: 36 mL/min (based on SCr of 0.76 mg/dL).    No results for input(s): \"TROP\", \"TROPHS\", \"CK\" in the last 168 hours.    Recent Labs   Lab 24  0226   PTP 15.0*    INR 1.17                  Microbiology    Hospital Encounter on 03/27/24   1. Blood Culture     Status: None    Collection Time: 03/28/24  3:25 AM    Specimen: Blood,peripheral   Result Value Ref Range    Blood Culture Result No Growth 5 Days N/A   2. Urine Culture, Routine     Status: Abnormal    Collection Time: 03/27/24  3:58 PM    Specimen: Urine, clean catch   Result Value Ref Range    Urine Culture >100,000 CFU/ML Escherichia coli (A) N/A       Susceptibility    Escherichia coli -  (no method available)     Ampicillin 4 Sensitive      Cefazolin <=4 Sensitive      Ciprofloxacin <=0.25 Sensitive      Gentamicin <=1 Sensitive      Meropenem <=0.25 Sensitive      Levofloxacin <=0.12 Sensitive      Nitrofurantoin <=16 Sensitive      Piperacillin + Tazobactam <=4 Sensitive      Trimethoprim/Sulfa <=20 Sensitive          Imaging: Reviewed in Epic.    Medications:    [Held by provider] enoxaparin  40 mg Subcutaneous Daily    cephalexin  500 mg Oral Q8H KAR    polyethylene glycol (PEG 3350)  17 g Oral Daily    melatonin  10 mg Oral Nightly    donepezil  5 mg Oral Nightly    sennosides  17.2 mg Oral Nightly    multivitamin  1 tablet Oral Daily    atorvastatin  10 mg Oral Daily    escitalopram  10 mg Oral Daily    dorzolamide-timolol  1 drop Both Eyes BID    latanoprost  1 drop Both Eyes Nightly    amitriptyline  25 mg Oral Nightly       Assessment & Plan:      #Right hip fx s/p Right femur intramedullary nailing 3/27 -norco prn, PT/OT per Ortho  #T8 Acute compression fx - IR eval for kypho, h/o prior kyphoplasty of T10-T11  #Hemorrhagic Shock 2/2 blood loss, doubt septic etiology, resolved  #Acute Blood Loss Anemia -s/p PRBCs 2 units, hgb stable  #E coli UTI - Keflex Day 6, Blood cultures NGTD  #Essential HTN - hold Coreg, patient reports she was prescribed but never took   #Dementia with hospital Delirium improved-Donepezil, PRN Seroquel   #Depression - Elavil, lexapro  #Orthostatic hypotension - midodrine  PRN      Quality:  DVT Mechanical Prophylaxis:   SCDs, Early ambuation  DVT Pharmacologic Prophylaxis   Medication    [Held by provider] enoxaparin (Lovenox) 40 MG/0.4ML SUBQ injection 40 mg         DVT Pharmacologic prophylaxis: Aspirin 81 mg      Code Status: DNAR/Selective Treatment  Kirk: External urinary catheter in place    Case d/w pt, dtrs, RN, MD.  Dc planning.  Reviewed imaging w/ pt dtrs at bedside.  [facility].  IR jaime for kypho and RN updated ortho on MRI results.     ERIN Herron  11:00 AM     The 21st Century Cures Act makes medical notes like these available to patients in the interest of transparency. Please be advised this is a medical document. Medical documents are intended to carry relevant information, facts as evident, and the clinical opinion of the practitioner. The medical note is intended as peer to peer communication and may appear blunt or direct. It is written in medical language and may contain abbreviations or verbiage that are unfamiliar.     Pt seen and examined, agree with above.  Pain overall controlled.  MRI T spine reviewed.    /61 (BP Location: Left arm)   Pulse 81   Temp 98 °F (36.7 °C) (Oral)   Resp 15   Ht 5' (1.524 m)   Wt 171 lb 8.3 oz (77.8 kg)   SpO2 96%   BMI 33.50 kg/m²   AOx3, NAD  S1+S2, RRR  CTA b/l  Soft, NT, ND, +BS  No LE edema    Acute compression fracture of T8, IR kyphoplasty ordered.    Shawna Matthews MD  4/2/2024

## 2024-04-02 NOTE — PLAN OF CARE
POD#5 R hip nailing. AxO4 - can be forgetful. VSS on RA - O2 NC PRN. On tele-NSR. SCD applied. Denies nausea, LBM 4/1. Purewick intact - incontinent. See MAR for PRN meds for pain control. On PO keflex for UTI. Dressing to R hip intact w/ scant drainage. Up max w/ liseth steady. MRI back done - hospitalist aware, will address in AM. Dc to [facility] when cleared. Daughters @ bedside. Updated on POC. Safety measures placed. Care ongoing.

## 2024-04-03 ENCOUNTER — APPOINTMENT (OUTPATIENT)
Dept: CT IMAGING | Facility: HOSPITAL | Age: 89
End: 2024-04-03
Attending: INTERNAL MEDICINE
Payer: MEDICARE

## 2024-04-03 ENCOUNTER — ANESTHESIA EVENT (OUTPATIENT)
Dept: INTERVENTIONAL RADIOLOGY/VASCULAR | Facility: HOSPITAL | Age: 89
End: 2024-04-03
Payer: MEDICARE

## 2024-04-03 ENCOUNTER — APPOINTMENT (OUTPATIENT)
Dept: INTERVENTIONAL RADIOLOGY/VASCULAR | Facility: HOSPITAL | Age: 89
End: 2024-04-03
Attending: PHYSICIAN ASSISTANT
Payer: MEDICARE

## 2024-04-03 LAB
ANION GAP SERPL CALC-SCNC: 6 MMOL/L (ref 0–18)
BASOPHILS # BLD AUTO: 0.07 X10(3) UL (ref 0–0.2)
BASOPHILS NFR BLD AUTO: 0.5 %
BUN BLD-MCNC: 16 MG/DL (ref 9–23)
CALCIUM BLD-MCNC: 8.9 MG/DL (ref 8.5–10.1)
CHLORIDE SERPL-SCNC: 104 MMOL/L (ref 98–112)
CO2 SERPL-SCNC: 26 MMOL/L (ref 21–32)
CREAT BLD-MCNC: 0.83 MG/DL
EGFRCR SERPLBLD CKD-EPI 2021: 67 ML/MIN/1.73M2 (ref 60–?)
EOSINOPHIL # BLD AUTO: 0.82 X10(3) UL (ref 0–0.7)
EOSINOPHIL NFR BLD AUTO: 6.2 %
ERYTHROCYTE [DISTWIDTH] IN BLOOD BY AUTOMATED COUNT: 15.3 %
GLUCOSE BLD-MCNC: 115 MG/DL (ref 70–99)
HCT VFR BLD AUTO: 29.5 %
HGB BLD-MCNC: 9.2 G/DL
IMM GRANULOCYTES # BLD AUTO: 0.24 X10(3) UL (ref 0–1)
IMM GRANULOCYTES NFR BLD: 1.8 %
LYMPHOCYTES # BLD AUTO: 2.51 X10(3) UL (ref 1–4)
LYMPHOCYTES NFR BLD AUTO: 18.9 %
MCH RBC QN AUTO: 29.9 PG (ref 26–34)
MCHC RBC AUTO-ENTMCNC: 31.2 G/DL (ref 31–37)
MCV RBC AUTO: 95.8 FL
MONOCYTES # BLD AUTO: 1.42 X10(3) UL (ref 0.1–1)
MONOCYTES NFR BLD AUTO: 10.7 %
NEUTROPHILS # BLD AUTO: 8.22 X10 (3) UL (ref 1.5–7.7)
NEUTROPHILS # BLD AUTO: 8.22 X10(3) UL (ref 1.5–7.7)
NEUTROPHILS NFR BLD AUTO: 61.9 %
OSMOLALITY SERPL CALC.SUM OF ELEC: 284 MOSM/KG (ref 275–295)
PLATELET # BLD AUTO: 333 10(3)UL (ref 150–450)
POTASSIUM SERPL-SCNC: 4.3 MMOL/L (ref 3.5–5.1)
RBC # BLD AUTO: 3.08 X10(6)UL
SODIUM SERPL-SCNC: 136 MMOL/L (ref 136–145)
WBC # BLD AUTO: 13.3 X10(3) UL (ref 4–11)

## 2024-04-03 PROCEDURE — 99232 SBSQ HOSP IP/OBS MODERATE 35: CPT | Performed by: INTERNAL MEDICINE

## 2024-04-03 PROCEDURE — 0PS43ZZ REPOSITION THORACIC VERTEBRA, PERCUTANEOUS APPROACH: ICD-10-PCS | Performed by: RADIOLOGY

## 2024-04-03 PROCEDURE — 0PU43JZ SUPPLEMENT THORACIC VERTEBRA WITH SYNTHETIC SUBSTITUTE, PERCUTANEOUS APPROACH: ICD-10-PCS | Performed by: RADIOLOGY

## 2024-04-03 PROCEDURE — 71275 CT ANGIOGRAPHY CHEST: CPT | Performed by: INTERNAL MEDICINE

## 2024-04-03 RX ORDER — LIDOCAINE HYDROCHLORIDE 10 MG/ML
INJECTION, SOLUTION INFILTRATION; PERINEURAL
Status: COMPLETED
Start: 2024-04-03 | End: 2024-04-03

## 2024-04-03 RX ORDER — ONDANSETRON 2 MG/ML
INJECTION INTRAMUSCULAR; INTRAVENOUS AS NEEDED
Status: DISCONTINUED | OUTPATIENT
Start: 2024-04-03 | End: 2024-04-03 | Stop reason: SURG

## 2024-04-03 RX ORDER — DEXTROSE MONOHYDRATE 25 G/50ML
50 INJECTION, SOLUTION INTRAVENOUS
Status: DISCONTINUED | OUTPATIENT
Start: 2024-04-03 | End: 2024-04-03 | Stop reason: HOSPADM

## 2024-04-03 RX ORDER — METOCLOPRAMIDE HYDROCHLORIDE 5 MG/ML
5 INJECTION INTRAMUSCULAR; INTRAVENOUS EVERY 8 HOURS PRN
Status: DISCONTINUED | OUTPATIENT
Start: 2024-04-03 | End: 2024-04-03 | Stop reason: HOSPADM

## 2024-04-03 RX ORDER — SODIUM CHLORIDE 9 MG/ML
INJECTION, SOLUTION INTRAVENOUS CONTINUOUS PRN
Status: DISCONTINUED | OUTPATIENT
Start: 2024-04-03 | End: 2024-04-03 | Stop reason: SURG

## 2024-04-03 RX ORDER — HYDROMORPHONE HYDROCHLORIDE 1 MG/ML
0.4 INJECTION, SOLUTION INTRAMUSCULAR; INTRAVENOUS; SUBCUTANEOUS EVERY 5 MIN PRN
Status: DISCONTINUED | OUTPATIENT
Start: 2024-04-03 | End: 2024-04-03 | Stop reason: HOSPADM

## 2024-04-03 RX ORDER — CEFAZOLIN SODIUM/WATER 2 G/20 ML
SYRINGE (ML) INTRAVENOUS AS NEEDED
Status: DISCONTINUED | OUTPATIENT
Start: 2024-04-03 | End: 2024-04-03 | Stop reason: SURG

## 2024-04-03 RX ORDER — HYDROCODONE BITARTRATE AND ACETAMINOPHEN 5; 325 MG/1; MG/1
1 TABLET ORAL ONCE AS NEEDED
Status: DISCONTINUED | OUTPATIENT
Start: 2024-04-03 | End: 2024-04-03 | Stop reason: HOSPADM

## 2024-04-03 RX ORDER — NICOTINE POLACRILEX 4 MG
15 LOZENGE BUCCAL
Status: DISCONTINUED | OUTPATIENT
Start: 2024-04-03 | End: 2024-04-03 | Stop reason: HOSPADM

## 2024-04-03 RX ORDER — HYDROMORPHONE HYDROCHLORIDE 1 MG/ML
0.2 INJECTION, SOLUTION INTRAMUSCULAR; INTRAVENOUS; SUBCUTANEOUS EVERY 5 MIN PRN
Status: DISCONTINUED | OUTPATIENT
Start: 2024-04-03 | End: 2024-04-03 | Stop reason: HOSPADM

## 2024-04-03 RX ORDER — NALOXONE HYDROCHLORIDE 0.4 MG/ML
0.08 INJECTION, SOLUTION INTRAMUSCULAR; INTRAVENOUS; SUBCUTANEOUS AS NEEDED
Status: DISCONTINUED | OUTPATIENT
Start: 2024-04-03 | End: 2024-04-03 | Stop reason: HOSPADM

## 2024-04-03 RX ORDER — SODIUM CHLORIDE, SODIUM LACTATE, POTASSIUM CHLORIDE, CALCIUM CHLORIDE 600; 310; 30; 20 MG/100ML; MG/100ML; MG/100ML; MG/100ML
INJECTION, SOLUTION INTRAVENOUS CONTINUOUS
Status: DISCONTINUED | OUTPATIENT
Start: 2024-04-03 | End: 2024-04-03 | Stop reason: HOSPADM

## 2024-04-03 RX ORDER — HYDROCODONE BITARTRATE AND ACETAMINOPHEN 5; 325 MG/1; MG/1
2 TABLET ORAL ONCE AS NEEDED
Status: DISCONTINUED | OUTPATIENT
Start: 2024-04-03 | End: 2024-04-03 | Stop reason: HOSPADM

## 2024-04-03 RX ORDER — HYDROMORPHONE HYDROCHLORIDE 1 MG/ML
0.6 INJECTION, SOLUTION INTRAMUSCULAR; INTRAVENOUS; SUBCUTANEOUS EVERY 5 MIN PRN
Status: DISCONTINUED | OUTPATIENT
Start: 2024-04-03 | End: 2024-04-03 | Stop reason: HOSPADM

## 2024-04-03 RX ORDER — NICOTINE POLACRILEX 4 MG
30 LOZENGE BUCCAL
Status: DISCONTINUED | OUTPATIENT
Start: 2024-04-03 | End: 2024-04-03 | Stop reason: HOSPADM

## 2024-04-03 RX ORDER — ACETAMINOPHEN 500 MG
1000 TABLET ORAL ONCE AS NEEDED
Status: DISCONTINUED | OUTPATIENT
Start: 2024-04-03 | End: 2024-04-03 | Stop reason: HOSPADM

## 2024-04-03 RX ORDER — ONDANSETRON 2 MG/ML
4 INJECTION INTRAMUSCULAR; INTRAVENOUS EVERY 6 HOURS PRN
Status: DISCONTINUED | OUTPATIENT
Start: 2024-04-03 | End: 2024-04-03 | Stop reason: HOSPADM

## 2024-04-03 RX ADMIN — ONDANSETRON 4 MG: 2 INJECTION INTRAMUSCULAR; INTRAVENOUS at 14:40:00

## 2024-04-03 RX ADMIN — SODIUM CHLORIDE: 9 INJECTION, SOLUTION INTRAVENOUS at 14:01:00

## 2024-04-03 RX ADMIN — SODIUM CHLORIDE: 9 INJECTION, SOLUTION INTRAVENOUS at 14:47:00

## 2024-04-03 RX ADMIN — CEFAZOLIN SODIUM/WATER 2 G: 2 G/20 ML SYRINGE (ML) INTRAVENOUS at 14:15:00

## 2024-04-03 NOTE — PROCEDURES
[facility]   part of [facility]  Procedure Note    Valeria Ramey Patient Status:  Inpatient    1934 MRN [id number]   Location [facility] POST ANESTHESIA CARE UNIT Attending Kesha Torres,    Hosp Day # 7 PCP JOSIAH VASQUEZ MD     Procedure: T8 kyphoplasty    Pre-Procedure Diagnosis:  painful osteoporotic compression fracture    Post-Procedure Diagnosis: same    Anesthesia:  Local and MAC    Findings:  good cement fill anterior 2/3 T8 vertebral body, no extravasation    Specimens: none    Blood Loss:  <10 ml    Tourniquet Time: n/a  Complications:  None  Drains:  none    Secondary Diagnosis:  none    Jessee Cedillo MD  4/3/2024

## 2024-04-03 NOTE — PHYSICAL THERAPY NOTE
Following for PT treatment. Pt with confirmed acute T8 superior endplate compression fx and plan for kypho this afternoon at 1400. Spoke with RN this morning who reported Pt experiencing back pain and wishes to defer another therapy session until after kypho. Will f/u tomorrow.

## 2024-04-03 NOTE — ANESTHESIA PREPROCEDURE EVALUATION
PRE-OP EVALUATION    Patient Name: Valeria Ramey    Admit Diagnosis: Fall, initial encounter [W19.XXXA]  Closed displaced intertrochanteric fracture of right femur, initial encounter ([facility]) [S72.319A]    Pre-op Diagnosis: * No pre-op diagnosis entered *        Anesthesia Procedure: IR KYPHOPLASTY    * No surgeons found in log *    Pre-op vitals reviewed.  Temp: 97.9 °F (36.6 °C)  Pulse: 94  Resp: 17  BP: 142/75  SpO2: 95 %  Body mass index is 33.5 kg/m².    Current medications reviewed.  Hospital Medications:   QUEtiapine (SEROquel) partial tab 12.5 mg  12.5 mg Oral Nightly    [COMPLETED] iopamidol 76% (ISOVUE-370) injection for power injector  100 mL Intravenous ONCE PRN    [COMPLETED] lidocaine (Xylocaine) 1 % injection        diphenhydrAMINE-zinc (Benadryl-Zinc) 2-0.1 % cream 1 Application  1 Application Topical TID PRN    [Held by provider] enoxaparin (Lovenox) 40 MG/0.4ML SUBQ injection 40 mg  40 mg Subcutaneous Daily    cephalexin (Keflex) cap 500 mg  500 mg Oral Q8H KAR    calcium carbonate (Tums) chewable tab 1,000 mg  1,000 mg Oral TID PRN    polyethylene glycol (PEG 3350) (Miralax) 17 g oral packet 17 g  17 g Oral Daily    midodrine (ProAmatine) tab 5 mg  5 mg Oral TID PRN    melatonin cap/tab 10 mg  10 mg Oral Nightly    donepezil (Aricept) tab 5 mg  5 mg Oral Nightly    [] sodium chloride 0.9 % IV bolus 500 mL  500 mL Intravenous Once PRN    sennosides (Senokot) tab 17.2 mg  17.2 mg Oral Nightly    multivitamin (Tab-A-Carlos/Beta Carotene) tab 1 tablet  1 tablet Oral Daily    [COMPLETED] nitroGLYCERIN (Nitrobid) 2 % ointment 1 inch  1 inch Topical Once    [COMPLETED] lactated ringers IV bolus 1,000 mL  1,000 mL Intravenous Once    [COMPLETED] sodium chloride 0.9% infusion   Intravenous Once    [COMPLETED] ondansetron (Zofran) 4 MG/2ML injection 4 mg  4 mg Intravenous Once    atorvastatin (Lipitor) tab 10 mg  10 mg Oral Daily    escitalopram (Lexapro) tab 10 mg  10 mg Oral Daily    [COMPLETED]  sodium chloride 0.9 % IV bolus 500 mL  500 mL Intravenous Once    [COMPLETED] morphINE PF 4 MG/ML injection 4 mg  4 mg Intravenous Once    [COMPLETED] ondansetron (Zofran) 4 MG/2ML injection 4 mg  4 mg Intravenous Once    [COMPLETED] HYDROmorphone (Dilaudid) 1 MG/ML injection 0.5 mg  0.5 mg Intravenous Once    [COMPLETED] dexamethasone (Decadron) 4 MG/ML injection 4 mg  4 mg Intravenous Once    [COMPLETED] lactated ringers IV bolus 500 mL  500 mL Intravenous Once PRN    [] atropine 0.1 MG/ML injection 0.5 mg  0.5 mg Intravenous PRN    [] naloxone (Narcan) 0.4 MG/ML injection 0.08 mg  0.08 mg Intravenous PRN    [] fentaNYL (Sublimaze) 50 mcg/mL injection 25 mcg  25 mcg Intravenous Q5 Min PRN    Or    [] fentaNYL (Sublimaze) 50 mcg/mL injection 50 mcg  50 mcg Intravenous Q5 Min PRN    [] HYDROmorphone (Dilaudid) 1 MG/ML injection 0.2 mg  0.2 mg Intravenous Q5 Min PRN    Or    [] HYDROmorphone (Dilaudid) 1 MG/ML injection 0.4 mg  0.4 mg Intravenous Q5 Min PRN    Or    [] HYDROmorphone (Dilaudid) 1 MG/ML injection 0.6 mg  0.6 mg Intravenous Q5 Min PRN    dorzolamide-timolol (Cosopt) 2-0.5 % ophthalmic solution 1 drop  1 drop Both Eyes BID    latanoprost (Xalatan) 0.005 % ophthalmic solution 1 drop  1 drop Both Eyes Nightly    amitriptyline (Elavil) tab 25 mg  25 mg Oral Nightly    sennosides (Senokot) tab 17.2 mg  17.2 mg Oral Nightly PRN    bisacodyl (Dulcolax) 10 MG rectal suppository 10 mg  10 mg Rectal Daily PRN    ondansetron (Zofran) 4 MG/2ML injection 4 mg  4 mg Intravenous Q6H PRN    benzonatate (Tessalon) cap 200 mg  200 mg Oral TID PRN    guaiFENesin (Robitussin) 100 MG/5 ML oral liquid 200 mg  200 mg Oral Q4H PRN    glycerin-hypromellose- (Artifical Tears) 0.2-0.2-1 % ophthalmic solution 1 drop  1 drop Both Eyes QID PRN    sodium chloride (Saline Mist) 0.65 % nasal solution 1 spray  1 spray Each Nare Q3H PRN    acetaminophen (Tylenol) tab 650 mg  650 mg  Oral Q4H PRN    Or    HYDROcodone-acetaminophen (Norco) 5-325 MG per tab 1 tablet  1 tablet Oral Q4H PRN    Or    HYDROcodone-acetaminophen (Norco) 5-325 MG per tab 2 tablet  2 tablet Oral Q4H PRN    HYDROmorphone (Dilaudid) 1 MG/ML injection 0.2 mg  0.2 mg Intravenous Q2H PRN    Or    HYDROmorphone (Dilaudid) 1 MG/ML injection 0.4 mg  0.4 mg Intravenous Q2H PRN    Or    HYDROmorphone (Dilaudid) 1 MG/ML injection 0.8 mg  0.8 mg Intravenous Q2H PRN    [COMPLETED] clonidine/epinephrine/ropivacaine/ketorolac in 0.9% NaCl 60 mL pain cocktail syringe for hip arthroplasty   Infiltration Once (Intra-Op)    [] lactated ringers IV bolus 500 mL  500 mL Intravenous Once PRN    [COMPLETED] HYDROmorphone (Dilaudid) 1 MG/ML injection        [COMPLETED] metoclopramide (Reglan) 5 mg/mL injection        [COMPLETED] acetaminophen (Ofirmev) 10 mg/mL infusion premix 1,000 mg  1,000 mg Intravenous Once    [COMPLETED] acetaminophen (Ofirmev) 10 mg/mL infusion premix           Outpatient Medications:     Medications Prior to Admission   Medication Sig Dispense Refill Last Dose    CRANBERRY EXTRACT OR Take 1 capsule by mouth 2 (two) times daily.   3/26/2024    dorzolamide-timolol 2-0.5 % Ophthalmic Solution Place 1 drop into both eyes 2 times daily after a meal.   3/26/2024    ALPRAZolam 0.25 MG Oral Tab Take 1 tablet (0.25 mg total) by mouth 3 (three) times daily as needed for Sleep.       atorvastatin 10 MG Oral Tab Take 1 tablet (10 mg total) by mouth daily.   3/26/2024    carvedilol 3.125 MG Oral Tab Take 1 tablet (3.125 mg total) by mouth daily as needed (If systolic Blood pressure over 150).       donepezil 5 MG Oral Tab Take 1 tablet (5 mg total) by mouth at bedtime.   3/26/2024    Loratadine 10 MG Oral Cap Take 10 mg by mouth daily as needed.       melatonin 1 MG Oral Tab Take 1.5 tablets (1.5 mg total) by mouth daily.   3/26/2024    midodrine 5 MG Oral Tab Take 1 tablet (5 mg total) by mouth daily as needed (If systolic  Blood pressure less than 120).       citalopram 20 MG Oral Tab Take 1 tablet (20 mg total) by mouth daily.   3/26/2024    latanoprost 0.005 % Ophthalmic Solution Place 1 drop into both eyes daily.   3/26/2024    aspirin 325 MG Oral Tab Take 1 tablet (325 mg total) by mouth daily.   3/26/2024    Calcium Carbonate-Vitamin D (CALCIUM 600-D OR) Take by mouth 2 (two) times daily.   3/26/2024       Allergies: Other and Lidocaine      Anesthesia Evaluation    Patient summary reviewed.    Anesthetic Complications  (-) history of anesthetic complications         GI/Hepatic/Renal                                 Cardiovascular      ECG reviewed.          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  Lab Results   Component Value Date    WBC 13.3 (H) 2024    RBC 3.08 (L) 2024    HGB 9.2 (L) 2024    HCT 29.5 (L) 2024    MCV 95.8 2024    MCH 29.9 2024    MCHC 31.2 2024    RDW 15.3 2024    .0 2024     Lab Results   Component Value Date     2024    K 4.3 2024     2024     CO2 26.0 04/03/2024    BUN 16 04/03/2024    CREATSERUM 0.83 04/03/2024     (H) 04/03/2024    CA 8.9 04/03/2024     Lab Results   Component Value Date    INR 1.17 03/28/2024         Airway      Mallampati: II  Mouth opening: >3 FB  TM distance: > 6 cm   Cardiovascular    Cardiovascular exam normal.         Dental             Pulmonary    Pulmonary exam normal.                 Other findings              ASA: 2   Plan: MAC  NPO status verified and patient meets guidelines.          Plan/risks discussed with: patient                Present on Admission:  **None**

## 2024-04-03 NOTE — CM/SW NOTE
Anticipate discharge after kypho today.  Updated [facility] and received confirmation pt can be accepted for admission today.  Ambulance transport scheduled for 530pm.  PCS form updated.  Updated pt's RN.    [facility]  P:[phone]  F:[phone]    [facility]  [phone]    Yovana Hardy, [facility]  Discharge Planner  [phone]

## 2024-04-03 NOTE — DIETARY NOTE
[facility]   part of [facility]   CLINICAL NUTRITION    Valeria Ramey     Admitting diagnosis:  Fall, initial encounter [W19.XXXA]  Closed displaced intertrochanteric fracture of right femur, initial encounter ([facility]) [S72.141A]    Ht: 152.4 cm (5')  Wt: 77.8 kg (171 lb 8.3 oz).   Body mass index is 33.5 kg/m².  IBW: 45.5 kg    Wt Readings from Last 6 Encounters:   03/28/24 77.8 kg (171 lb 8.3 oz)   10/12/20 61.2 kg (135 lb)   04/23/18 66.6 kg (146 lb 12.8 oz)   09/12/17 71.8 kg (158 lb 6.4 oz)   08/21/17 68 kg (150 lb)   12/28/16 68.6 kg (151 lb 3.2 oz)        Labs/Meds reviewed  -Glu:115  -MVI, abx, Miralax, Senokot    Diet:       Procedures    NPO     Percent Meals Eaten (last 3 days)       Date/Time Percent Meals Eaten (%)    03/31/24 1151 50 %    04/01/24 1540 100 %    04/02/24 1430 100 %    04/02/24 1730 --     Percent Meals Eaten (%): Patient ate a cookie at 04/02/24 1730          Pt chart reviewed d/t length of stay. Pt off unit at time of visit. Currently NPO. Plans for kyphoplasty today. Has T8 compression fx.    Pt w/ R intertrochanteric femur fx.  S/p R femur intramedullary nailing on 3/27.    Recorded PO intakes vary:% with 75% or greater most meals.  Will add Ensure Plus High Protein at breakfast to help maximize protein intakes.    Wts highly vary this admit:150 lb-171 lb. Recommend reweigh.      PMH:HTN, depression, dementia, recurrent falls, polyarthritis.     Patient is at low nutrition risk at this time.    Please consult if patient status changes or nutrition issues arise.    Anila Hollis MS, RD, LDN  Clinical Dietitian  Ext:[phone]

## 2024-04-03 NOTE — PLAN OF CARE
POD#7 R hip nailing. AxO4 - can be forgetful. VSS on RA - O2 NC PRN. On tele-NSR. SCD applied. Denies nausea, LBM 4/1. Purewick intact - incontinent. See MAR for PRN meds for pain control. On PO keflex for UTI. Dressing to R hip intact w/ scant drainage. Up max w/ liseth steady. Benadryl cream given to back for itchiness. NPO for kyphoplasty on 4/3 @ 1400. Family chose [facility] at discharge. Daughters @ bedside. Updated on POC. Safety measures placed. Care ongoing.

## 2024-04-03 NOTE — PROGRESS NOTES
Occupational Therapy    Following for OT treatment. Pt with confirmed acute T8 superior endplate compression fx and plan for kypho this afternoon at 1400. Spoke with RN this morning who reported Pt experiencing back pain and wishes to defer another therapy session until after kypho. Will f/u tomorrow.

## 2024-04-03 NOTE — ANESTHESIA POSTPROCEDURE EVALUATION
[facility]    Valeria Ramey Patient Status:  Inpatient   Age/Gender 89 year old female MRN [id number]   Location [facility] POST ANESTHESIA CARE UNIT Attending Kesha Torres DO   Hosp Day # 7 PCP JOSIAH VASQUEZ MD       Anesthesia Post-op Note        Procedure Summary       Date: 04/03/24 Room / Location: [facility] Interventional Suites    Anesthesia Start: 1401 Anesthesia Stop: 1450    Procedure: IR KYPHOPLASTY Diagnosis: (T8 compression fx)    Scheduled Providers: Taj Wooten MD Anesthesiologist: Miranda Che MD    Anesthesia Type: MAC ASA Status: 2            Anesthesia Type: MAC    Vitals Value Taken Time   /73 04/03/24 1510   Temp 98.2 04/03/24 1511   Pulse 82 04/03/24 1511   Resp 16 04/03/24 1511   SpO2 96 % 04/03/24 1511   Vitals shown include unfiled device data.    Patient Location: PACU    Anesthesia Type: MAC    Airway Patency: patent    Postop Pain Control: adequate    Mental Status: preanesthetic baseline    Nausea/Vomiting: none    Cardiopulmonary/Hydration status: stable euvolemic    Complications: no apparent anesthesia related complications    Postop vital signs: stable    Dental Exam: Unchanged from Preop    Patient to be discharged from PACU when criteria met.

## 2024-04-03 NOTE — PROGRESS NOTES
[facility]   part of [facility]     Hospitalist Progress Note     Valeria Ramey Patient Status:  Inpatient    1934 MRN [id number]   Location [facility] 4SW-A Attending Kesha Torres, DO   Hosp Day # 7 PCP JOSIAH VASQUEZ MD     Chief Complaint: Fall    Subjective:     Is complaining of a \"deep\" left sided chest pain, sharp, radiates into back beneath left breast, particularly worse with deep breathing     Objective:    Review of Systems:   A comprehensive review of systems was completed; pertinent positive and negatives stated in subjective.    Vital signs:  Temp:  [97.9 °F (36.6 °C)-98.2 °F (36.8 °C)] 97.9 °F (36.6 °C)  Pulse:  [75-94] 94  Resp:  [15-25] 17  BP: (114-149)/(44-75) 142/75  SpO2:  [92 %-98 %] 95 %    Physical Exam:    General: No acute distress  Respiratory: No wheezes, no rhonchi  Cardiovascular: S1, S2, tachycardic, regular rhythm   Abdomen: Soft, Non-tender, non-distended, positive bowel sounds  Neuro: No new focal deficits.   Extremities: No edema      Diagnostic Data:    Labs:  Recent Labs   Lab 24  0226 24  0958 24  0423 03/30/24  0459 03/31/24  0440 04/01/24  0446 04/03/24  041   WBC  --   --  17.5* 14.9* 13.6* 12.5*  12.5* 13.3*   HGB  --    < > 10.1* 9.5* 8.8* 8.7*  8.7* 9.2*   MCV  --   --  87.7 96.2 99.0 95.8  95.8 95.8   PLT  --   --  149.0* 147.0* 165.0 209.0  209.0 333.0   INR 1.17  --   --   --   --   --   --     < > = values in this interval not displayed.       Recent Labs   Lab 24   GLU 98 96 119*   BUN 14 15 14   CREATSERUM 0.69 0.84 0.76   CA 7.9* 8.0* 8.0*   ALB 2.1* 2.1* 2.0*    134* 137   K 4.5 4.4 4.1    107 104   CO2 19.0* 24.0 30.0   ALKPHO 55 60 63   AST 23 22 23   ALT 14 15 16   BILT 0.6 0.6 0.6   TP 5.2* 5.0* 5.1*       Estimated Creatinine Clearance: 36 mL/min (based on SCr of 0.76 mg/dL).    No results for input(s): \"TROP\", \"TROPHS\", \"CK\" in the last 168 hours.    Recent  Labs   Lab 03/28/24  0226   PTP 15.0*   INR 1.17                  Microbiology    Hospital Encounter on 03/27/24   1. Blood Culture     Status: None    Collection Time: 03/28/24  3:25 AM    Specimen: Blood,peripheral   Result Value Ref Range    Blood Culture Result No Growth 5 Days N/A   2. Urine Culture, Routine     Status: Abnormal    Collection Time: 03/27/24  3:58 PM    Specimen: Urine, clean catch   Result Value Ref Range    Urine Culture >100,000 CFU/ML Escherichia coli (A) N/A       Susceptibility    Escherichia coli -  (no method available)     Ampicillin 4 Sensitive      Cefazolin <=4 Sensitive      Ciprofloxacin <=0.25 Sensitive      Gentamicin <=1 Sensitive      Meropenem <=0.25 Sensitive      Levofloxacin <=0.12 Sensitive      Nitrofurantoin <=16 Sensitive      Piperacillin + Tazobactam <=4 Sensitive      Trimethoprim/Sulfa <=20 Sensitive          Imaging: Reviewed in Epic.    Medications:    QUEtiapine  12.5 mg Oral Nightly    [Held by provider] enoxaparin  40 mg Subcutaneous Daily    cephalexin  500 mg Oral Q8H KAR    polyethylene glycol (PEG 3350)  17 g Oral Daily    melatonin  10 mg Oral Nightly    donepezil  5 mg Oral Nightly    sennosides  17.2 mg Oral Nightly    multivitamin  1 tablet Oral Daily    atorvastatin  10 mg Oral Daily    escitalopram  10 mg Oral Daily    dorzolamide-timolol  1 drop Both Eyes BID    latanoprost  1 drop Both Eyes Nightly    amitriptyline  25 mg Oral Nightly       Assessment & Plan:      #Left sided pleuritic chest pain, Sinus tachycardia   -possibly related to T8 fracture but also high concern for PE  -Stat CTA Chest    #Acute T8 compression fracture  -planned IR Kyphoplasty today     #Right hip fracture s/p Right femur intramedullary nailing 3/27  -pain management per Ortho  -PT/OT    #UTI  -Keflex     #Hemorrhagic Shock 2/2 blood loss, doubt septic etiology   -resolved     #Acute Blood Loss Anemia  -Hb stable     #Hx of essential HTN  -hold Coreg, patient reports she  was prescribed but never took     #Dementia with hospital Delirium   -Donepizil  -Nightly PRN Seroquel   -avoid Xanax     #Depression - Elavil, lexapro  #Orthostatic hypotension - midodrine PRN      Kesha Torres DO    Supplementary Documentation:     Quality:  DVT Mechanical Prophylaxis:   SCDs, Early ambuation  DVT Pharmacologic Prophylaxis   Medication    [Held by provider] enoxaparin (Lovenox) 40 MG/0.4ML SUBQ injection 40 mg         DVT Pharmacologic prophylaxis: Aspirin 81 mg      Code Status: DNAR/Selective Treatment  Kirk: External urinary catheter in place    At this point Ms. Ramey is expected to be discharge to: TBD    The 21st Century Cures Act makes medical notes like these available to patients in the interest of transparency. Please be advised this is a medical document. Medical documents are intended to carry relevant information, facts as evident, and the clinical opinion of the practitioner. The medical note is intended as peer to peer communication and may appear blunt or direct. It is written in medical language and may contain abbreviations or verbiage that are unfamiliar.             **Certification      PHYSICIAN Certification of Need for Inpatient Hospitalization - Initial Certification    Patient will require inpatient services that will reasonably be expected to span two midnight's based on the clinical documentation in H+P.   Based on patients current state of illness, I anticipate that, after discharge, patient will require TBD.

## 2024-04-04 VITALS
WEIGHT: 172.38 LBS | OXYGEN SATURATION: 92 % | SYSTOLIC BLOOD PRESSURE: 113 MMHG | DIASTOLIC BLOOD PRESSURE: 56 MMHG | HEIGHT: 60 IN | TEMPERATURE: 98 F | BODY MASS INDEX: 33.84 KG/M2 | HEART RATE: 98 BPM | RESPIRATION RATE: 15 BRPM

## 2024-04-04 PROCEDURE — 99239 HOSP IP/OBS DSCHRG MGMT >30: CPT | Performed by: INTERNAL MEDICINE

## 2024-04-04 NOTE — PLAN OF CARE
Patient is alert and oriented x 3. Vitals stable on 2L oxygen. Denies pain. Denies numbness & tingling. Dressing changed to right hip. Dressing to back with scant amt of drainage. Voiding without difficulty using purewick. Tolerating regular diet. Up Max assist. Plan to discharge to [facility]. Plan of care discussed with patient.

## 2024-04-04 NOTE — CM/SW NOTE
Informed by RN that pt can discharge to NH today.  Ambulance transport scheduled for 1130am.  PCS form completed and available for RN to print.  Updated [facility] via [facility] and message left for admissions.  / to remain available for support and/or discharge planning.     [facility]  P:[phone]  F:[phone]    [facility]  [phone]    Yovana Hardy LCSW  Discharge Planner  [phone]

## 2024-04-04 NOTE — OCCUPATIONAL THERAPY NOTE
OCCUPATIONAL THERAPY TREATMENT NOTE - INPATIENT     Room Number: 386/386-A  Session: 2   Number of Visits to Meet Established Goals: 5    Presenting Problem: 3/27 R femur IM nailing  Prior Level of Function: Prior to admission, patient's baseline is Mod I for all ADLs except for min A for showering.    ASSESSMENT   Patient demonstrates limited progress this session, goals remain in progress.    Patient continues to function below baseline with toileting, lower body dressing, bed mobility, transfers, static standing balance, dynamic standing balance, and functional standing tolerance.   Contributing factors to remaining limitations include decreased functional strength, decreased endurance, pain, impaired standing balance, impaired coordination, impaired motor planning, decreased muscular endurance, and cognitive deficits (dementia).  Next session anticipate patient to progress toileting, lower body dressing, bed mobility, transfers, static standing balance, dynamic standing balance, and functional standing tolerance.  Occupational Therapy will continue to follow patient for duration of hospitalization.    Patient continues to benefit from continued skilled OT services: to promote return to prior level of function and safety with continuous assistance and gradual rehabilitative therapy .               History: Patient is a 89 year old female admitted on 3/27/2024 with Presenting Problem: 3/27 R femur IM nailing. Co-Morbidities : dementia    WEIGHT BEARING RESTRICTION  Weight Bearing Restriction: R lower extremity        R Lower Extremity: Weight Bearing as Tolerated       Recommendations for nursing staff:   Transfers: brett cantrell  Tomae location: bedside commode    TREATMENT SESSION:  Patient Start of Session: semi supine in bed; family present at bedside  FUNCTIONAL TRANSFER ASSESSMENT  Sit to Stand: Edge of Bed  Edge of Bed: Dependent (max A x 2)    BED MOBILITY  Rolling: Maximum Assist  Supine to Sit : Maximum  Assist (mod A x 2)  Scooting: not tested    BALANCE ASSESSMENT  Static Sitting: Supervision  Sitting Bilateral: Contact Guard Assist  Static Standing: Maximum Assist  Standing Bilateral: Dependent    FUNCTIONAL ADL ASSESSMENT  Eating: Not Tested  Grooming Seated: Not Tested  LB Dressing Seated: Not Tested      ACTIVITY TOLERANCE: fair                         O2 SATURATIONS       EDUCATION PROVIDED  Patient: Role of Occupational Therapy; Plan of Care; Discharge Recommendations; Functional Transfer Techniques; Fall Prevention; Posture/Positioning; Energy Conservation; Proper Body Mechanics  Patient's Response to Education: Requires Further Education    Therapist comments: Pt performs bed mobility at mod A x 2 with cues provided for hand placement and sequencing. Pt requires max A to engage in facilitated weight shifting from sitting to scooting to EOB in preparation for transfer with brett stekatie. Sit to stand transfer performed at max A x 2 from EOB to brett stedy and transfer to chair. Pt and family educated on importance of daily mobility for improved functional transfers and ADLs.  Patient End of Session: Up in chair;Needs met;Call light within reach;RN aware of session/findings;All patient questions and concerns addressed;Alarm set;Family present    SUBJECTIVE  \"Scratch my back harder.\"    PAIN ASSESSMENT  Rating: Unable to rate  Location: back  Management Techniques: Activity promotion;Body mechanics;Breathing techniques;Relaxation;Repositioning     OBJECTIVE  Precautions: Bed/chair alarm    AM-PAC ‘6-Clicks’ Inpatient Daily Activity Short Form  -   Putting on and taking off regular lower body clothing?: Total  -   Bathing (including washing, rinsing, drying)?: A Lot  -   Toileting, which includes using toilet, bedpan or urinal? : Total  -   Putting on and taking off regular upper body clothing?: A Little  -   Taking care of personal grooming such as brushing teeth?: A Little  -   Eating meals?: A Little    AM-PAC  Score:  Score: 13  Approx Degree of Impairment: 63.03%  Standardized Score (AM-PAC Scale): 32.03    PLAN  OT Treatment Plan: Balance activities;Energy conservation/work simplification techniques;IADL training;ADL training;Continued evaluation;Compensatory technique education;Equipment eval/education;Patient/Family training;Patient/Family education;Endurance training;UE strengthening/ROM;Functional transfer training  Rehab Potential : Good  Frequency: 3-5x/week    OT Goals:     All goals ongoing 04/04    ADL Goals   Patient will perform grooming: with setup  Patient will perform upper body dressing:  with supervision  Patient will perform lower body dressing:  with mod assist     Functional Transfer Goals  Patient will transfer from supine to sit:  with supervision  Patient will transfer from sit to stand:  with mod assist    OT Session Time: 15 minutes  Therapeutic Activity: 15 minutes

## 2024-04-04 NOTE — PROGRESS NOTES
AVS reviewed, IV's x 2 dc'd by Chris STEELE, scripts in envelope, will be on Lovenox x 2 weeks then ASA- see orders , will dc to [facility] at 1130 am via ambulance.

## 2024-04-04 NOTE — PHYSICAL THERAPY NOTE
PHYSICAL THERAPY TREATMENT NOTE - INPATIENT    Room Number: 386/386-A     Session: 3/10     Number of Visits to Meet Established Goals: 10    Presenting Problem: R femur fracture  Co-Morbidities : dementia    History related to current admission: Patient is a 89 year old female admitted on 3/27/2024 from home for fall.  Pt diagnosed with R femur fracture, s/p IM nailing 3/27/24.     HOME SITUATION  Type of Home: Assisted living facility   Home Layout: One level  Lives With: Alone     Prior Level of [place]: Pt reports ambulating to dining room for meals with RW.  Pt is able to get self out of bed in the morning.    S/p Kyphoplasty T8 4/8/24     ASSESSMENT   Patient demonstrates limited progress this session, goals  remain in progress.    Patient continues to function below baseline with bed mobility, transfers, gait, maintaining seated position, and standing prolonged periods.  Contributing factors to remaining limitations include decreased functional strength, decreased endurance/aerobic capacity, pain, impaired static and dynamic sitting/standing balance, impaired coordination, impaired motor planning, decreased muscular endurance, and limited RLE ROM.  Next session anticipate patient to progress bed mobility, transfers, and gait.  Physical Therapy will continue to follow patient for duration of hospitalization.    Patient continues to benefit from continued skilled PT services: to promote return to prior level of function and safety with continuous assistance and gradual rehabilitative therapy .    PLAN  PT Treatment Plan: Bed mobility;Body mechanics;Coordination;Endurance;Energy conservation;Patient education;Family education;Gait training;Neuromuscular re-educate;Range of motion;Strengthening;Stoop training;Transfer training;Balance training;Stair training  Rehab Potential : Good  Frequency (Obs): 3-5x/week    CURRENT GOALS      Goal #1 Patient is able to demonstrate supine - sit EOB @ level:  supervision      Goal #2 Patient is able to demonstrate transfers Sit to/from Stand at assistance level: supervision      Goal #3 Patient is able to ambulate 50 feet with assist device: walker - rolling at assistance level: supervision      Goal #4     Goal #5     Goal #6     Goal Comments: Goals established on 3/28/2024  2024 all goals ongoing    SUBJECTIVE  \"Scratch my back harder.\"    OBJECTIVE  Precautions: Bed/chair alarm    WEIGHT BEARING RESTRICTION  Weight Bearing Restriction: R lower extremity        R Lower Extremity: Weight Bearing as Tolerated       PAIN ASSESSMENT   Ratin  Location: R hip, back  Management Techniques: Activity promotion;Body mechanics;Repositioning    BALANCE                                                                                                                       Static Sitting: Fair  Dynamic Sitting: Fair -           Static Standing: Poor  Dynamic Standing: Poor -    ACTIVITY TOLERANCE                         O2 WALK         AM-PAC '6-Clicks' INPATIENT SHORT FORM - BASIC MOBILITY  How much difficulty does the patient currently have...  Patient Difficulty: Turning over in bed (including adjusting bedclothes, sheets and blankets)?: A Lot   Patient Difficulty: Sitting down on and standing up from a chair with arms (e.g., wheelchair, bedside commode, etc.): A Lot   Patient Difficulty: Moving from lying on back to sitting on the side of the bed?: A Lot   How much help from another person does the patient currently need...   Help from Another: Moving to and from a bed to a chair (including a wheelchair)?: A Lot   Help from Another: Need to walk in hospital room?: A Lot   Help from Another: Climbing 3-5 steps with a railing?: Total       AM-PAC Score:  Raw Score: 11   Approx Degree of Impairment: 72.57%   Standardized Score (AM-PAC Scale): 33.86   CMS Modifier (G-Code): CL    FUNCTIONAL ABILITY STATUS  Gait Assessment   Functional Mobility/Gait Assessment  Gait Assistance:  Not tested  Distance (ft): 12 inches  Assistive Device: Rolling walker  Pattern: R Foot drag;Shuffle (unable to weight shift to R side to advance LLE)    Skilled Therapy Provided    Bed Mobility:  Rolling: NT   Supine<>Sit: w/ HOB significantly elevated- modA x 2 to reach sitting EOB assist to bring trunk forward and RLE off bed   Sit<>Supine: NT     Transfer Mobility:  Sit<>Stand: maxAx 2 to brett steady   Stand<>Sit: maxA x 2   Gait: NT    Therapist's Comments:   Patient presents sitting up in bed. Multiple family members present in room. Getting ready for discharge to rehab. Reports minor pain at rest, incr pain with activity. Bed mobility at modA x 2 to reach sitting EOB. Incr difficulty moving RLE to transfer off bed. Sit to stand to brett steady maxA x 2 with height of bed elevated. Brought to bedside chair. Upright in chair at end of session. Discussed importance of continued out of bed mobility and importance of continuing to practice transfers. Pt and family verbalize understanding.     Patient End of Session: Up in chair;Needs met;Call light within reach;RN aware of session/findings;All patient questions and concerns addressed;SCDs in place;Alarm set;Family present;Discussed recommendations with /    PT Session Time: 20 minutes  Therapeutic Activity: 15 minutes

## 2024-04-04 NOTE — PLAN OF CARE
Pt A&O 2-3. On room air this morning. Did need O2 1-2 L per NC prn last noc. Tolerating diet with fair appetite. Last BM 4/2/24. Miralax given this AM. Voiding per magaly. Pt is incontinent of urine. Pain managed with oral medications. Pt reminded to \"call;; don't fall\". Bed/chair alarms on. Participating in therapy as ordered. Up with mod/max assist x2 using Sarastedy. Right hip and back drsgs C/D/I. Ice packs as needed for pain/swelling. Mepilex to sacrum intact. Pt ready for dc to [facility] today. Pt's family at bedside and updated with plan of care.

## 2024-04-04 NOTE — PROGRESS NOTES
Attempted to call report to RN at [facility]. Voicemail left.    [facility]  [facility]

## 2024-09-05 NOTE — PLAN OF CARE
Ashleigh Gentile,    The report and subsequent biopsies from your recent upper endoscopy are available for you to review.  Overall, everything is looking pretty normal.  The report suggest that there may have been some irritation in your stomach but the biopsies are looking pretty normal.    Please let me know if you have any questions.    Sincerely,  Puma MALIN [facility] GI, Hepatology, and Nutrition   Pt arrived from PACU post procedure hypotensive on levophed. Levophed titrated off- see eMAR. Pt is A&o4 with some prompting. Follows all commands. Right hip wound has old drainage, soft to touch. Ortho doctor notified overnight regarding drop in Hgb level and increased pressor need- 2 units of PRBC ordered. One unit transfused and second unit endorsed to day RN. No complaints of pain. 1L bolus given per APRN tolerating well. 3 L NC. No BM. Purewick in place. Daughters at bedside and updated on POC. Echo done. Call light within reach. See flow sheets for further information.

## 2024-10-23 ENCOUNTER — LAB REQUISITION (OUTPATIENT)
Dept: LAB | Facility: HOSPITAL | Age: 89
End: 2024-10-23
Payer: MEDICARE

## 2024-10-23 DIAGNOSIS — Z00.00 ENCOUNTER FOR GENERAL ADULT MEDICAL EXAMINATION WITHOUT ABNORMAL FINDINGS: ICD-10-CM

## 2024-10-23 LAB
ANION GAP SERPL CALC-SCNC: 5 MMOL/L (ref 0–18)
BUN BLD-MCNC: 19 MG/DL (ref 9–23)
CALCIUM BLD-MCNC: 9.4 MG/DL (ref 8.7–10.4)
CHLORIDE SERPL-SCNC: 104 MMOL/L (ref 98–112)
CO2 SERPL-SCNC: 27 MMOL/L (ref 21–32)
CREAT BLD-MCNC: 0.78 MG/DL
EGFRCR SERPLBLD CKD-EPI 2021: 72 ML/MIN/1.73M2 (ref 60–?)
FASTING STATUS PATIENT QL REPORTED: YES
GLUCOSE BLD-MCNC: 86 MG/DL (ref 70–99)
OSMOLALITY SERPL CALC.SUM OF ELEC: 284 MOSM/KG (ref 275–295)
POTASSIUM SERPL-SCNC: 4.1 MMOL/L (ref 3.5–5.1)
SODIUM SERPL-SCNC: 136 MMOL/L (ref 136–145)

## 2024-10-23 PROCEDURE — 80048 BASIC METABOLIC PNL TOTAL CA: CPT | Performed by: FAMILY MEDICINE

## 2024-10-30 ENCOUNTER — LAB REQUISITION (OUTPATIENT)
Dept: LAB | Facility: HOSPITAL | Age: 89
End: 2024-10-30
Payer: MEDICARE

## 2024-10-30 DIAGNOSIS — E78.5 HYPERLIPIDEMIA, UNSPECIFIED: ICD-10-CM

## 2024-10-30 DIAGNOSIS — I10 ESSENTIAL (PRIMARY) HYPERTENSION: ICD-10-CM

## 2024-10-30 LAB
ALBUMIN SERPL-MCNC: 3.6 G/DL (ref 3.2–4.8)
ALBUMIN/GLOB SERPL: 1.3 {RATIO} (ref 1–2)
ALP LIVER SERPL-CCNC: 78 U/L
ALT SERPL-CCNC: 9 U/L
ANION GAP SERPL CALC-SCNC: 7 MMOL/L (ref 0–18)
AST SERPL-CCNC: 16 U/L (ref ?–34)
BASOPHILS # BLD AUTO: 0.05 X10(3) UL (ref 0–0.2)
BASOPHILS NFR BLD AUTO: 0.6 %
BILIRUB SERPL-MCNC: 0.3 MG/DL (ref 0.2–0.9)
BUN BLD-MCNC: 15 MG/DL (ref 9–23)
CALCIUM BLD-MCNC: 9.1 MG/DL (ref 8.7–10.4)
CHLORIDE SERPL-SCNC: 104 MMOL/L (ref 98–112)
CO2 SERPL-SCNC: 27 MMOL/L (ref 21–32)
CREAT BLD-MCNC: 0.87 MG/DL
EGFRCR SERPLBLD CKD-EPI 2021: 63 ML/MIN/1.73M2 (ref 60–?)
EOSINOPHIL # BLD AUTO: 0.87 X10(3) UL (ref 0–0.7)
EOSINOPHIL NFR BLD AUTO: 11.3 %
ERYTHROCYTE [DISTWIDTH] IN BLOOD BY AUTOMATED COUNT: 13.1 %
FASTING STATUS PATIENT QL REPORTED: YES
GLOBULIN PLAS-MCNC: 2.8 G/DL (ref 2–3.5)
GLUCOSE BLD-MCNC: 83 MG/DL (ref 70–99)
HCT VFR BLD AUTO: 35.6 %
HGB BLD-MCNC: 11.9 G/DL
IMM GRANULOCYTES # BLD AUTO: 0.02 X10(3) UL (ref 0–1)
IMM GRANULOCYTES NFR BLD: 0.3 %
LYMPHOCYTES # BLD AUTO: 1.86 X10(3) UL (ref 1–4)
LYMPHOCYTES NFR BLD AUTO: 24.1 %
MCH RBC QN AUTO: 33.1 PG (ref 26–34)
MCHC RBC AUTO-ENTMCNC: 33.4 G/DL (ref 31–37)
MCV RBC AUTO: 98.9 FL
MONOCYTES # BLD AUTO: 0.68 X10(3) UL (ref 0.1–1)
MONOCYTES NFR BLD AUTO: 8.8 %
NEUTROPHILS # BLD AUTO: 4.25 X10 (3) UL (ref 1.5–7.7)
NEUTROPHILS # BLD AUTO: 4.25 X10(3) UL (ref 1.5–7.7)
NEUTROPHILS NFR BLD AUTO: 54.9 %
OSMOLALITY SERPL CALC.SUM OF ELEC: 286 MOSM/KG (ref 275–295)
PLATELET # BLD AUTO: 309 10(3)UL (ref 150–450)
POTASSIUM SERPL-SCNC: 4.3 MMOL/L (ref 3.5–5.1)
PROT SERPL-MCNC: 6.4 G/DL (ref 5.7–8.2)
RBC # BLD AUTO: 3.6 X10(6)UL
SODIUM SERPL-SCNC: 138 MMOL/L (ref 136–145)
WBC # BLD AUTO: 7.7 X10(3) UL (ref 4–11)

## 2024-10-30 PROCEDURE — 80053 COMPREHEN METABOLIC PANEL: CPT | Performed by: FAMILY MEDICINE

## 2024-10-30 PROCEDURE — 85025 COMPLETE CBC W/AUTO DIFF WBC: CPT | Performed by: FAMILY MEDICINE

## 2024-11-27 ENCOUNTER — HOSPITAL ENCOUNTER (INPATIENT)
Facility: HOSPITAL | Age: 89
LOS: 3 days | Discharge: NURSING, REHAB OR LONG-TERM CARE | End: 2024-11-30
Attending: EMERGENCY MEDICINE | Admitting: HOSPITALIST
Payer: MEDICARE

## 2024-11-27 ENCOUNTER — APPOINTMENT (OUTPATIENT)
Dept: CT IMAGING | Facility: HOSPITAL | Age: 89
End: 2024-11-27
Attending: EMERGENCY MEDICINE
Payer: MEDICARE

## 2024-11-27 ENCOUNTER — APPOINTMENT (OUTPATIENT)
Dept: MRI IMAGING | Facility: HOSPITAL | Age: 89
End: 2024-11-27
Attending: INTERNAL MEDICINE
Payer: MEDICARE

## 2024-11-27 ENCOUNTER — APPOINTMENT (OUTPATIENT)
Dept: GENERAL RADIOLOGY | Facility: HOSPITAL | Age: 89
End: 2024-11-27
Attending: EMERGENCY MEDICINE
Payer: MEDICARE

## 2024-11-27 DIAGNOSIS — N30.00 ACUTE CYSTITIS WITHOUT HEMATURIA: ICD-10-CM

## 2024-11-27 DIAGNOSIS — R53.1 WEAKNESS GENERALIZED: Primary | ICD-10-CM

## 2024-11-27 DIAGNOSIS — M54.50 BACK PAIN, LUMBOSACRAL: ICD-10-CM

## 2024-11-27 PROBLEM — E78.5 DYSLIPIDEMIA: Status: ACTIVE | Noted: 2024-11-27

## 2024-11-27 LAB
ALBUMIN SERPL-MCNC: 4 G/DL (ref 3.2–4.8)
ALBUMIN/GLOB SERPL: 1.5 {RATIO} (ref 1–2)
ALP LIVER SERPL-CCNC: 84 U/L
ALT SERPL-CCNC: 8 U/L
ANION GAP SERPL CALC-SCNC: 4 MMOL/L (ref 0–18)
APTT PPP: 24.9 SECONDS (ref 23–36)
AST SERPL-CCNC: 15 U/L (ref ?–34)
BASOPHILS # BLD AUTO: 0.07 X10(3) UL (ref 0–0.2)
BASOPHILS NFR BLD AUTO: 0.6 %
BILIRUB SERPL-MCNC: 0.3 MG/DL (ref 0.2–0.9)
BILIRUB UR QL STRIP.AUTO: NEGATIVE
BUN BLD-MCNC: 13 MG/DL (ref 9–23)
CALCIUM BLD-MCNC: 9.1 MG/DL (ref 8.7–10.4)
CHLORIDE SERPL-SCNC: 105 MMOL/L (ref 98–112)
CO2 SERPL-SCNC: 28 MMOL/L (ref 21–32)
COLOR UR AUTO: YELLOW
CREAT BLD-MCNC: 1.04 MG/DL
EGFRCR SERPLBLD CKD-EPI 2021: 51 ML/MIN/1.73M2 (ref 60–?)
EOSINOPHIL # BLD AUTO: 0.76 X10(3) UL (ref 0–0.7)
EOSINOPHIL NFR BLD AUTO: 6.8 %
ERYTHROCYTE [DISTWIDTH] IN BLOOD BY AUTOMATED COUNT: 12.9 %
GLOBULIN PLAS-MCNC: 2.6 G/DL (ref 2–3.5)
GLUCOSE BLD-MCNC: 98 MG/DL (ref 70–99)
GLUCOSE UR STRIP.AUTO-MCNC: NORMAL MG/DL
HCT VFR BLD AUTO: 40.8 %
HGB BLD-MCNC: 13.3 G/DL
IMM GRANULOCYTES # BLD AUTO: 0.03 X10(3) UL (ref 0–1)
IMM GRANULOCYTES NFR BLD: 0.3 %
INR BLD: 0.98 (ref 0.8–1.2)
KETONES UR STRIP.AUTO-MCNC: NEGATIVE MG/DL
LEUKOCYTE ESTERASE UR QL STRIP.AUTO: 500
LYMPHOCYTES # BLD AUTO: 2.22 X10(3) UL (ref 1–4)
LYMPHOCYTES NFR BLD AUTO: 19.9 %
MCH RBC QN AUTO: 32.2 PG (ref 26–34)
MCHC RBC AUTO-ENTMCNC: 32.6 G/DL (ref 31–37)
MCV RBC AUTO: 98.8 FL
MONOCYTES # BLD AUTO: 0.87 X10(3) UL (ref 0.1–1)
MONOCYTES NFR BLD AUTO: 7.8 %
NEUTROPHILS # BLD AUTO: 7.18 X10 (3) UL (ref 1.5–7.7)
NEUTROPHILS # BLD AUTO: 7.18 X10(3) UL (ref 1.5–7.7)
NEUTROPHILS NFR BLD AUTO: 64.6 %
OSMOLALITY SERPL CALC.SUM OF ELEC: 284 MOSM/KG (ref 275–295)
PH UR STRIP.AUTO: 6 [PH] (ref 5–8)
PLATELET # BLD AUTO: 280 10(3)UL (ref 150–450)
POTASSIUM SERPL-SCNC: 4.5 MMOL/L (ref 3.5–5.1)
PROT SERPL-MCNC: 6.6 G/DL (ref 5.7–8.2)
PROT UR STRIP.AUTO-MCNC: 20 MG/DL
PROTHROMBIN TIME: 13.1 SECONDS (ref 11.6–14.8)
RBC # BLD AUTO: 4.13 X10(6)UL
RBC #/AREA URNS AUTO: >10 /HPF
SARS-COV-2 RNA RESP QL NAA+PROBE: NOT DETECTED
SODIUM SERPL-SCNC: 137 MMOL/L (ref 136–145)
SP GR UR STRIP.AUTO: 1.01 (ref 1–1.03)
UROBILINOGEN UR STRIP.AUTO-MCNC: NORMAL MG/DL
WBC # BLD AUTO: 11.1 X10(3) UL (ref 4–11)
WBC #/AREA URNS AUTO: >50 /HPF

## 2024-11-27 PROCEDURE — 99223 1ST HOSP IP/OBS HIGH 75: CPT | Performed by: INTERNAL MEDICINE

## 2024-11-27 PROCEDURE — 74177 CT ABD & PELVIS W/CONTRAST: CPT | Performed by: EMERGENCY MEDICINE

## 2024-11-27 PROCEDURE — 70450 CT HEAD/BRAIN W/O DYE: CPT | Performed by: EMERGENCY MEDICINE

## 2024-11-27 PROCEDURE — 71045 X-RAY EXAM CHEST 1 VIEW: CPT | Performed by: EMERGENCY MEDICINE

## 2024-11-27 RX ORDER — ACETAMINOPHEN 500 MG
1000 TABLET ORAL 3 TIMES DAILY PRN
COMMUNITY

## 2024-11-27 RX ORDER — ESCITALOPRAM OXALATE 10 MG/1
10 TABLET ORAL DAILY
Status: DISCONTINUED | OUTPATIENT
Start: 2024-11-28 | End: 2024-11-30

## 2024-11-27 RX ORDER — ATORVASTATIN CALCIUM 10 MG/1
10 TABLET, FILM COATED ORAL DAILY
Status: DISCONTINUED | OUTPATIENT
Start: 2024-11-28 | End: 2024-11-30

## 2024-11-27 RX ORDER — AMITRIPTYLINE HYDROCHLORIDE 50 MG/1
50 TABLET ORAL NIGHTLY
COMMUNITY

## 2024-11-27 RX ORDER — ACETAMINOPHEN 500 MG
500 TABLET ORAL EVERY 4 HOURS PRN
Status: DISCONTINUED | OUTPATIENT
Start: 2024-11-27 | End: 2024-11-30

## 2024-11-27 RX ORDER — ONDANSETRON 2 MG/ML
4 INJECTION INTRAMUSCULAR; INTRAVENOUS EVERY 6 HOURS PRN
Status: DISCONTINUED | OUTPATIENT
Start: 2024-11-27 | End: 2024-11-30

## 2024-11-27 RX ORDER — ASPIRIN 325 MG
325 TABLET ORAL DAILY
COMMUNITY

## 2024-11-27 RX ORDER — DOXEPIN HYDROCHLORIDE 50 MG/1
1 CAPSULE ORAL DAILY
COMMUNITY

## 2024-11-27 RX ORDER — DORZOLAMIDE HYDROCHLORIDE AND TIMOLOL MALEATE 20; 5 MG/ML; MG/ML
1 SOLUTION/ DROPS OPHTHALMIC 2 TIMES DAILY
Status: DISCONTINUED | OUTPATIENT
Start: 2024-11-27 | End: 2024-11-30

## 2024-11-27 RX ORDER — FERROUS SULFATE 325(65) MG
325 TABLET, DELAYED RELEASE (ENTERIC COATED) ORAL
COMMUNITY

## 2024-11-27 RX ORDER — DONEPEZIL HYDROCHLORIDE 5 MG/1
5 TABLET, FILM COATED ORAL NIGHTLY
Status: DISCONTINUED | OUTPATIENT
Start: 2024-11-27 | End: 2024-11-30

## 2024-11-27 RX ORDER — METOCLOPRAMIDE HYDROCHLORIDE 5 MG/ML
5 INJECTION INTRAMUSCULAR; INTRAVENOUS EVERY 8 HOURS PRN
Status: DISCONTINUED | OUTPATIENT
Start: 2024-11-27 | End: 2024-11-30

## 2024-11-27 RX ORDER — HYDROCODONE BITARTRATE AND ACETAMINOPHEN 5; 325 MG/1; MG/1
1 TABLET ORAL EVERY 4 HOURS PRN
Status: DISCONTINUED | OUTPATIENT
Start: 2024-11-27 | End: 2024-11-30

## 2024-11-27 RX ORDER — DORZOLAMIDE HYDROCHLORIDE AND TIMOLOL MALEATE 20; 5 MG/ML; MG/ML
1 SOLUTION/ DROPS OPHTHALMIC 2 TIMES DAILY
COMMUNITY

## 2024-11-27 RX ORDER — SODIUM CHLORIDE 9 MG/ML
INJECTION, SOLUTION INTRAVENOUS CONTINUOUS
Status: CANCELLED | OUTPATIENT
Start: 2024-11-27 | End: 2024-11-27

## 2024-11-27 RX ORDER — HEPARIN SODIUM 5000 [USP'U]/ML
5000 INJECTION, SOLUTION INTRAVENOUS; SUBCUTANEOUS EVERY 8 HOURS SCHEDULED
Status: DISCONTINUED | OUTPATIENT
Start: 2024-11-27 | End: 2024-11-30

## 2024-11-27 RX ORDER — AMITRIPTYLINE HYDROCHLORIDE 50 MG/1
50 TABLET ORAL NIGHTLY
Status: DISCONTINUED | OUTPATIENT
Start: 2024-11-27 | End: 2024-11-30

## 2024-11-27 RX ORDER — BACLOFEN 10 MG/1
10 TABLET ORAL EVERY 12 HOURS PRN
COMMUNITY

## 2024-11-27 RX ORDER — BACLOFEN 10 MG/1
10 TABLET ORAL EVERY 12 HOURS PRN
Status: DISCONTINUED | OUTPATIENT
Start: 2024-11-27 | End: 2024-11-30

## 2024-11-27 RX ORDER — ONDANSETRON 4 MG/1
4 TABLET, FILM COATED ORAL EVERY 6 HOURS PRN
COMMUNITY

## 2024-11-27 RX ORDER — LATANOPROST 50 UG/ML
1 SOLUTION/ DROPS OPHTHALMIC NIGHTLY
Status: DISCONTINUED | OUTPATIENT
Start: 2024-11-27 | End: 2024-11-30

## 2024-11-27 NOTE — ED PROVIDER NOTES
Patient Seen in: [facility] Emergency Department      History     Chief Complaint   Patient presents with    Weakness     Stated Complaint: bilateral leg weakness    Subjective:   HPI      Patient is a 89-year-old female presents emergency room with a history of bilateral leg weakness and some lower back pain which has been ongoing today.  The patient's daughter who is giving independent history at the bedside states that the patient was at physical therapy as she had a previous right hip injury as well as a right tib-fib injury.  The patient has been going to physical therapy reportedly today developed discomfort in her lower back felt weak in both of her legs.  The patient has had no history of any fever.  No history of any fall or trauma.  Patient has had no history of any chest pain or abdominal pain.  The patient denies history of any other somatic complaints or discomfort at this time.  Patient has had no known history of any incontinence.    Objective:     Past Medical History:    Anxiety state    Cancer (HCC)    skin cancer on lip     Cataract    Depression    Glaucoma    High cholesterol    Osteoporosis    Polyarthritis              Past Surgical History:   Procedure Laterality Date    Appendectomy      Cataract            x 8    Other surgical history      s/p adrenal adenoma    Skin surgery  2019    MMS of SCC to Left Upper Cutaneous Lip by MM    Tonsillectomy                  Social History     Socioeconomic History    Marital status:     Number of children: 8   Occupational History    Occupation: Homeworker   Tobacco Use    Smoking status: Never    Smokeless tobacco: Never   Vaping Use    Vaping status: Never Used   Substance and Sexual Activity    Alcohol use: Yes     Comment: occ    Drug use: No     Social Drivers of Health     Food Insecurity: No Food Insecurity (3/27/2024)    Food Insecurity     Food Insecurity: Never true   Transportation Needs: No Transportation Needs  (3/27/2024)    Transportation Needs     Lack of Transportation: No   Housing Stability: Low Risk  (3/27/2024)    Housing Stability     Housing Instability: No                  Physical Exam     ED Triage Vitals   BP 11/27/24 1354 155/73   Pulse 11/27/24 1352 78   Resp 11/27/24 1352 20   Temp 11/27/24 1354 98.3 °F (36.8 °C)   Temp src 11/27/24 1354 Temporal   SpO2 11/27/24 1354 94 %   O2 Device 11/27/24 1354 None (Room air)       Current Vitals:   Vital Signs  BP: 158/74  Pulse: 75  Resp: 22  Temp: 98.3 °F (36.8 °C)  Temp src: Temporal  MAP (mmHg): 99    Oxygen Therapy  SpO2: 93 %  O2 Device: None (Room air)        Physical Exam  GENERAL: Well-developed, well-nourished female sitting up breathing easily in no apparent distress.  Patient is nontoxic appearance.  HEENT: Head is normocephalic, atraumatic. Pupils are 4 mm equally round and reactive to light. Oropharynx is clear. Mucous membranes are moist.  NECK: There is no focal tenderness to palpation appreciated.  No stridor.    LUNGS: Clear to auscultation bilaterally with no wheeze. There is good equal air entry bilaterally.  HEART: Regular rate and rhythm. Normal S1, S2 no S3, or S4. No murmur.  ABDOMEN: There is no focal tenderness to palpation appreciated anywhere throughout the abdomen. There is no guarding, no rebound, no mass, and no organomegaly appreciated. There is normoactive bowel sounds.   BACK: There is paraspinal tenderness to palpation of the area of the lumbar spine with no focal midline tenderness to palpation throughout the thoracic or lumbar spinous processes.  There is no overlying ecchymosis or rash appreciated.    EXTREMITIES: There is no cyanosis, clubbing, or edema appreciated. Pulses are 2+ and equal in all 4 extremities.  There is no overlying erythema in either lower extremity appreciated.  NEURO: Patient is awake, alert and oriented to time place and person. Motor strength is 5 over 5 in all 4 extremities. There are no gross motor or  sensory deficits appreciated. Cranial nerves II through XII are intact.  Patient answering all questions appropriately.          ED Course     Labs Reviewed   URINALYSIS WITH CULTURE REFLEX - Abnormal; Notable for the following components:       Result Value    Clarity Urine Turbid (*)     Blood Urine 1+ (*)     Protein Urine 20 (*)     Nitrite Urine 1+ (*)     Leukocyte Esterase Urine 500 (*)     WBC Urine >50 (*)     RBC Urine >10 (*)     Bacteria Urine 2+ (*)     Squamous Epi. Cells Few (*)     All other components within normal limits   CBC WITH DIFFERENTIAL WITH PLATELET - Abnormal; Notable for the following components:    WBC 11.1 (*)     Eosinophil Absolute 0.76 (*)     All other components within normal limits   COMP METABOLIC PANEL (14) - Abnormal; Notable for the following components:    Creatinine 1.04 (*)     eGFR-Cr 51 (*)     ALT 8 (*)     All other components within normal limits   PROTHROMBIN TIME (PT) - Normal   PTT, ACTIVATED - Normal   RAPID SARS-COV-2 BY PCR - Normal   RAINBOW DRAW LAVENDER   RAINBOW DRAW LIGHT GREEN   RAINBOW DRAW BLUE   URINE CULTURE, ROUTINE   BLOOD CULTURE   BLOOD CULTURE     EKG    Rate, intervals and axes as noted on EKG Report.  Rate: 73  Rhythm: Sinus Rhythm  Reading: No acute ischemic change noted                CT ABDOMEN+PELVIS(CONTRAST ONLY)(CPT=74177)    Result Date: 11/27/2024  CONCLUSION:  Cholelithiasis.  No biliary ductal dilatation.  2.1 cm peripherally calcified splenic artery aneurysm.  There is suggestion of wall thickening of the stomach.  This may be accentuated by incomplete distension.  Recommend clinical correlation to exclude gastritis.  Irregular wall thickening of the urinary bladder.  Recommend clinical correlation to exclude cystitis.  Post vertebroplasty changes lower thoracic spine with degenerative disc disease lumbar spine with grade 1 spondylolisthesis L4-5.  LOCATION:  [facility]   Dictated by (CST): Rea Martínez MD on 11/27/2024 at 4:44 PM      Finalized by (CST): Rea Martínez MD on 11/27/2024 at 4:58 PM       CT BRAIN OR HEAD (CPT=70450)    Result Date: 11/27/2024  CONCLUSION:  There is no CT evidence for acute intracranial hemorrhage.  There is diffuse cerebral and cerebellar atrophy with chronic microvascular ischemic changes of aging.    LOCATION:  [facility]   Dictated by (CST): Rea Martínez MD on 11/27/2024 at 4:41 PM     Finalized by (CST): Rea Martínez MD on 11/27/2024 at 4:44 PM       XR CHEST AP PORTABLE  (CPT=71045)    Result Date: 11/27/2024  CONCLUSION:  1. Linear densities in lungs are stable and probably chronic atelectasis or scar. 2. There have been multiple previous kyphoplasties.   LOCATION:  42      Dictated by (CST): Filiberto Larios MD on 11/27/2024 at 2:59 PM     Finalized by (CST): Filiberto Larios MD on 11/27/2024 at 3:00 PM             [facility]          17:21 patient sitting back and breathing easily in no apparent distress time.  Patient appears comfortable on repeat examination is sitting up breathing easily in no apparent distress.  Patient's daughters updated regarding lab results and the need for admission to the hospital as the patient is a fall risk and is also had a couple falls  recently.  The patient's case discussed with Dr. Rivas who came and saw the patient in the emergency room and the patient will be admitted for further care.    Admission disposition: 11/27/2024  5:20 PM           Medical Decision Making  Patient had IV line established blood work drawn including a CBC, chemistries, BUN/creatinine, and blood sugar all of which are unremarkable.  Liver function test found to be negative.  Coags are unremarkable.  Urinalysis is significant for urinary tract infection.  Patient underwent x-ray and CT findings with results as noted above.  Patient and patient's daughter were made aware of all CT findings and the need for admission to the hospital as the patient has back pain, weakness, and is a fall risk at this time.   The patient with no focal neurologic deficits at this time.  Patient's case has been discussed with Dr. Rivas at this time.  The patient will be admitted for further care at this time.    Disposition and Plan     Clinical Impression:  1. Weakness generalized    2. Acute cystitis without hematuria    3. Back pain, lumbosacral         Disposition:  Admit  11/27/2024  5:20 pm    Follow-up:  No follow-up provider specified.        Medications Prescribed:  Current Discharge Medication List              Supplementary Documentation:         Hospital Problems       Present on Admission  Date Reviewed: 4/3/2024            ICD-10-CM Noted POA    * (Principal) Weakness generalized R53.1 11/27/2024 Unknown

## 2024-11-27 NOTE — H&P
[facility]     Valeria Ramey Patient Status:  Emergency    1934 MRN [id number]   Location [facility] EMERGENCY DEPARTMENT Attending Saroj Knott MD   Hosp Day # 0 PCP JOSIAH VASQUEZ MD     Chief Complaint: back pain, leg weakness    Subjective:    History of Present Illness:     Valeria Ramey is a 89 year old female with PMhx of anxiety, depression, DL presents back pain, leg weakness. Pt has had recent hip fracture and tib fib fracture on R side over the past few months. She has been working with therapy at her assisted living. Today she comes in with worsening back pain and bilateral leg weakness. She does have history of kyphoplasty in the past. She denies any falls or trauma. She denies any bowel incontinency. She denies any CP or SOB. When asked which side hurts she states the opposite side of what she told family. She denies any F/C. No urinary complaints. In ED she was found to have UTI.     Kypho in past  No falls  No trauma  No F/C  Gen weakness  Hx of broken hip    UTI  CT head neg  CXR neg  CT gallstones, splenic arter aneurysm, kypho    Cultures  IV abx    History/Other:    Past Medical History:  Past Medical History:    Anxiety state    Cancer (HCC)    skin cancer on lip 2019    Cataract    Depression    Glaucoma    High cholesterol    Osteoporosis    Polyarthritis     Past Surgical History:   Past Surgical History:   Procedure Laterality Date    Appendectomy      Cataract            x 8    Other surgical history      s/p adrenal adenoma    Skin surgery  2019    MMS of SCC to Left Upper Cutaneous Lip by MM    Tonsillectomy        Family History:   Family History   Problem Relation Age of Onset    Cancer Father 46        Hodgkin's Lymphoma    Other (Other) Mother         osteoporosis     Social History:    reports that she has never smoked. She has never used smokeless tobacco. She reports current alcohol use. She reports that she does not  use drugs.     Allergies: Allergies[1]    Medications:  Medications Ordered Prior to Encounter[2]    Review of Systems:   A comprehensive review of systems was completed.    Pertinent positives and negatives noted in the HPI.    Objective:   Physical Exam:    /74   Pulse 75   Temp 98.3 °F (36.8 °C) (Temporal)   Resp 22   Ht 162.6 cm (5' 4\")   Wt 170 lb (77.1 kg)   SpO2 93%   BMI 29.18 kg/m²   General: No acute distress, Alert  Respiratory: No rhonchi, no wheezes  Cardiovascular: S1, S2. Regular rate and rhythm  Abdomen: Soft, Non-tender, non-distended, positive bowel sounds  Neuro: No new focal deficits  Extremities: No edema      Results:    Labs:      Labs Last 24 Hours:    Recent Labs   Lab 11/27/24  1402   RBC 4.13   HGB 13.3   HCT 40.8   MCV 98.8   MCH 32.2   MCHC 32.6   RDW 12.9   NEPRELIM 7.18   WBC 11.1*   .0       Recent Labs   Lab 11/27/24  1402   GLU 98   BUN 13   CREATSERUM 1.04*   EGFRCR 51*   CA 9.1   ALB 4.0      K 4.5      CO2 28.0   ALKPHO 84   AST 15   ALT 8*   BILT 0.3   TP 6.6       Lab Results   Component Value Date    INR 0.98 11/27/2024    INR 1.17 03/28/2024       No results for input(s): \"TROP\", \"TROPHS\", \"CK\" in the last 168 hours.    No results for input(s): \"TROP\", \"PBNP\" in the last 168 hours.    No results for input(s): \"PCT\" in the last 168 hours.    Imaging: Imaging data reviewed in Epic.    Assessment & Plan:      #Gen Weakness  #Bilateral leg weakness  No fractures seen on imaging  PT/OT eval  ? Deconditioning, effects of UTI  Check MRI of lumbar region    #UTI  Continue empiric abx  Follow cultures    #Dyslipidemia  statin    #Depression/Anxiety  Continue home meds    #Dementia?  On aricept    #Splenic Artery Aneurysm  Incidental finding  Consider Vascular eval in AM          Plan of care discussed with patient, ED Physician     Sean Rivas MD    Supplementary Documentation:     The 21st Century Cures Act makes medical notes like these available  to patients in the interest of transparency. Please be advised this is a medical document. Medical documents are intended to carry relevant information, facts as evident, and the clinical opinion of the practitioner. The medical note is intended as peer to peer communication and may appear blunt or direct. It is written in medical language and may contain abbreviations or verbiage that are unfamiliar.                                       [1]   Allergies  Allergen Reactions    Other SWELLING     Catgut sutures    Lidocaine RASH     Reports allergy to lidocaine patch    [2]   No current facility-administered medications on file prior to encounter.     Current Outpatient Medications on File Prior to Encounter   Medication Sig Dispense Refill    enoxaparin 40 MG/0.4ML Injection Solution Prefilled Syringe Inject 0.4 mL (40 mg total) into the skin daily. Take for 2 weeks after surgery. 14 each 0    polyethylene glycol, PEG 3350, 17 g Oral Powd Pack Take 17 g by mouth daily. 14 each 0    sennosides 17.2 MG Oral Tab Take 1 tablet (17.2 mg total) by mouth nightly as needed (constipation). 14 tablet 0    dorzolamide-timolol 2-0.5 % Ophthalmic Solution Place 1 drop into both eyes 2 (two) times daily. 1 each 0    HYDROcodone-acetaminophen 5-325 MG Oral Tab Take 1 tablet by mouth every 4 (four) hours as needed. 24 tablet 0    aspirin 81 MG Oral Tab EC Take 1 tablet (81 mg total) by mouth 2 (two) times daily. Start after lovenox regiment has been completed. Take for 4 weeks. 56 tablet 0    CRANBERRY EXTRACT OR Take 1 capsule by mouth 2 (two) times daily.      atorvastatin 10 MG Oral Tab Take 1 tablet (10 mg total) by mouth daily.      donepezil 5 MG Oral Tab Take 1 tablet (5 mg total) by mouth at bedtime.      Loratadine 10 MG Oral Cap Take 10 mg by mouth daily as needed.      melatonin 1 MG Oral Tab Take 1.5 tablets (1.5 mg total) by mouth daily.      midodrine 5 MG Oral Tab Take 1 tablet (5 mg total) by mouth daily as needed  (If systolic Blood pressure less than 120).      citalopram 20 MG Oral Tab Take 1 tablet (20 mg total) by mouth daily.      latanoprost 0.005 % Ophthalmic Solution Place 1 drop into both eyes daily.      Calcium Carbonate-Vitamin D (CALCIUM 600-D OR) Take by mouth 2 (two) times daily.

## 2024-11-27 NOTE — ED QUICK NOTES
Orders for admission, patient is aware of plan and ready to go upstairs. Any questions, please call ED RN Ann LAINEZ at extension [phone].     Patient Covid vaccination status: Unvaccinated     COVID Test Ordered in ED: Rapid SARS-CoV-2 by PCR    COVID Suspicion at Admission: N/A    Running Infusions:      Mental Status/LOC at time of transport: A&Ox4    Other pertinent information:   CIWA score: N/A   NIH score:  N/A

## 2024-11-28 ENCOUNTER — APPOINTMENT (OUTPATIENT)
Dept: MRI IMAGING | Facility: HOSPITAL | Age: 89
End: 2024-11-28
Attending: INTERNAL MEDICINE
Payer: MEDICARE

## 2024-11-28 LAB
ANION GAP SERPL CALC-SCNC: 7 MMOL/L (ref 0–18)
ATRIAL RATE: 73 BPM
BASOPHILS # BLD AUTO: 0.06 X10(3) UL (ref 0–0.2)
BASOPHILS NFR BLD AUTO: 0.8 %
BUN BLD-MCNC: 9 MG/DL (ref 9–23)
CALCIUM BLD-MCNC: 8.9 MG/DL (ref 8.7–10.4)
CHLORIDE SERPL-SCNC: 106 MMOL/L (ref 98–112)
CO2 SERPL-SCNC: 22 MMOL/L (ref 21–32)
CREAT BLD-MCNC: 0.73 MG/DL
EGFRCR SERPLBLD CKD-EPI 2021: 78 ML/MIN/1.73M2 (ref 60–?)
EOSINOPHIL # BLD AUTO: 0.69 X10(3) UL (ref 0–0.7)
EOSINOPHIL NFR BLD AUTO: 9.6 %
ERYTHROCYTE [DISTWIDTH] IN BLOOD BY AUTOMATED COUNT: 12.6 %
GLUCOSE BLD-MCNC: 86 MG/DL (ref 70–99)
HCT VFR BLD AUTO: 39.6 %
HGB BLD-MCNC: 13 G/DL
IMM GRANULOCYTES # BLD AUTO: 0.02 X10(3) UL (ref 0–1)
IMM GRANULOCYTES NFR BLD: 0.3 %
LYMPHOCYTES # BLD AUTO: 1.98 X10(3) UL (ref 1–4)
LYMPHOCYTES NFR BLD AUTO: 27.5 %
MCH RBC QN AUTO: 32 PG (ref 26–34)
MCHC RBC AUTO-ENTMCNC: 32.8 G/DL (ref 31–37)
MCV RBC AUTO: 97.5 FL
MONOCYTES # BLD AUTO: 0.53 X10(3) UL (ref 0.1–1)
MONOCYTES NFR BLD AUTO: 7.4 %
NEUTROPHILS # BLD AUTO: 3.92 X10 (3) UL (ref 1.5–7.7)
NEUTROPHILS # BLD AUTO: 3.92 X10(3) UL (ref 1.5–7.7)
NEUTROPHILS NFR BLD AUTO: 54.4 %
OSMOLALITY SERPL CALC.SUM OF ELEC: 278 MOSM/KG (ref 275–295)
P AXIS: 67 DEGREES
P-R INTERVAL: 184 MS
PLATELET # BLD AUTO: 296 10(3)UL (ref 150–450)
POTASSIUM SERPL-SCNC: 4.4 MMOL/L (ref 3.5–5.1)
Q-T INTERVAL: 390 MS
QRS DURATION: 74 MS
QTC CALCULATION (BEZET): 429 MS
R AXIS: 61 DEGREES
RBC # BLD AUTO: 4.06 X10(6)UL
SODIUM SERPL-SCNC: 135 MMOL/L (ref 136–145)
T AXIS: 57 DEGREES
VENTRICULAR RATE: 73 BPM
WBC # BLD AUTO: 7.2 X10(3) UL (ref 4–11)

## 2024-11-28 PROCEDURE — 99232 SBSQ HOSP IP/OBS MODERATE 35: CPT | Performed by: HOSPITALIST

## 2024-11-28 PROCEDURE — 72148 MRI LUMBAR SPINE W/O DYE: CPT | Performed by: INTERNAL MEDICINE

## 2024-11-28 RX ORDER — IBUPROFEN 600 MG/1
600 TABLET, FILM COATED ORAL 3 TIMES DAILY
Status: DISCONTINUED | OUTPATIENT
Start: 2024-11-28 | End: 2024-11-30

## 2024-11-28 RX ORDER — FAMOTIDINE 20 MG/1
20 TABLET, FILM COATED ORAL DAILY
Status: DISCONTINUED | OUTPATIENT
Start: 2024-11-28 | End: 2024-11-30

## 2024-11-28 NOTE — PLAN OF CARE
Problem: Weakness, UTI  Data: Ax03. [place]. Q8 vitals. , on room air. Afebrile. Denied pain. Incontinent. Purewick. PT/OT consult. Regular diet. Saline lock. MRI completed.   Intervention: heparin, eye drops  Education: Medications, call light , fall precautions   Response: Cooperative with care    Problem: Patient/Family Goals  Goal: Patient/Family Long Term Goal  Description: Patient's Long Term Goal:   Increased mobility before discharge    Interventions:  - Follow plan of care  - See additional Care Plan goals for specific interventions  Outcome: Progressing  Goal: Patient/Family Short Term Goal  Description: Patient's Short Term Goal:   11/27 noc: get MRI    Interventions:   -  IV rocephin    - See additional Care Plan goals for specific interventions  Outcome: Progressing     Problem: Impaired Functional Mobility  Goal: Achieve highest/safest level of mobility/gait  Description: Interventions:  - Assess patient's functional ability and stability  - Promote increasing activity/tolerance for mobility and gait  - Educate and engage patient/family in tolerated activity level and precautions  - Elevate left lower extremity  Outcome: Progressing

## 2024-11-28 NOTE — PLAN OF CARE
Pt is alert to self and time, able to reorient, forgetful. Room air, O2 sating WNL. No tele. Pt denies pain when not moving, reports pain when coughing or moving in bed. Denies shortness of breath and nausea. Family at bedside. Up to chair, needed brett steady. Pt updated on POC, all questions and concerns addressed, pt verbalized understanding. Safety precautions in place, no further needs at this time.    Problem: Patient/Family Goals  Goal: Patient/Family Long Term Goal  Description: Patient's Long Term Goal:   Increased mobility before discharge    Interventions:  - Identify barriers to discharge w/pt and caregiver  - Include patient/family/discharge partner in discharge planning  - Arrange for needed discharge resources and transportation as appropriate  - Identify discharge learning needs   - Consider post-discharge preferences of patient/family/discharge partner  - Assess patient's ability to be responsible for managing their own health  - Refer to Case Management Department for coordinating discharge planning if the patient needs post-hospital services  - Follow plan of care  - See additional Care Plan goals for specific interventions  Outcome: Progressing  Goal: Patient/Family Short Term Goal  Description: Patient's Short Term Goal:   11/27 noc: get MRI  11/28: have MRI read    Interventions:   -  IV rocephin    - See additional Care Plan goals for specific interventions  Outcome: Progressing     Problem: Impaired Functional Mobility  Goal: Achieve highest/safest level of mobility/gait  Description: Interventions:  - Assess patient's functional ability and stability  - Promote increasing activity/tolerance for mobility and gait  - Educate and engage patient/family in tolerated activity level and precautions  - Recommend use of sit-stand lift for transfers  Outcome: Progressing

## 2024-11-28 NOTE — PROGRESS NOTES
[facility]   part of [facility]     Hospitalist Progress Note     Valeria Ramey Patient Status:  Inpatient    1934 MRN [id number]   Location [facility] 5NW-A Attending Sukhwinder Robles MD   Hosp Day # 1 PCP JOSIAH VASQUEZ MD     Chief Complaint: back pain and weakness    Subjective:     Patient cont to complain of back pain, even a cough makes her low back hurt. Cont to feel weak. Daughter at the bedside. Cough been present for months and has had complete white as outpatient     Objective:    Review of Systems:   A comprehensive review of systems was completed; pertinent positive and negatives stated in subjective.    Vital signs:  Temp:  [98.3 °F (36.8 °C)-98.4 °F (36.9 °C)] 98.4 °F (36.9 °C)  Pulse:  [72-80] 80  Resp:  [18-22] 18  BP: (128-171)/(60-74) 128/60  SpO2:  [91 %-100 %] 91 %    Physical Exam:    General: No acute distress  Respiratory: No wheezes, no rhonchi  Cardiovascular: S1, S2, regular rate and rhythm  Abdomen: Soft, Non-tender, non-distended, positive bowel sounds  Neuro: No new focal deficits.   Extremities: No edema      Diagnostic Data:    Labs:  Recent Labs   Lab 24  1402   WBC 11.1*   HGB 13.3   MCV 98.8   .0   INR 0.98       Recent Labs   Lab 24  1402   GLU 98   BUN 13   CREATSERUM 1.04*   CA 9.1   ALB 4.0      K 4.5      CO2 28.0   ALKPHO 84   AST 15   ALT 8*   BILT 0.3   TP 6.6       Estimated Creatinine Clearance: 31 mL/min (A) (based on SCr of 1.04 mg/dL (H)).    No results for input(s): \"TROP\", \"TROPHS\", \"CK\" in the last 168 hours.    Recent Labs   Lab 24  1402   PTP 13.1   INR 0.98                  Microbiology    No results found for this visit on 24.      Imaging: Reviewed in Epic.    Medications:    amitriptyline  50 mg Oral Nightly    atorvastatin  10 mg Oral Daily    escitalopram  10 mg Oral Daily    donepezil  5 mg Oral Nightly    heparin  5,000 Units Subcutaneous Q8H KAR    cefTRIAXone  1 g Intravenous Q24H     latanoprost  1 drop Both Eyes Nightly    dorzolamide-timolol  1 drop Both Eyes BID    melatonin  1.5 mg Oral Nightly       Assessment & Plan:      #Gen Weakness  #Bilateral leg weakness  No fractures seen on imaging  PT/OT eval  ? Deconditioning, effects of UTI  MRI of lumbar region complete this AM, read pending  pt has hx of kypho to T8 in April 2024     #UTI  Cont iv Ceftriaxone (11/27-)  Bcx / Ucx      #Dyslipidemia  statin     #Depression/Anxiety  Continue home meds     #Dementia?  On aricept     #Splenic Artery Aneurysm  Incidental finding  Consider Vascular eval in AM      Ravi Veliz,     Supplementary Documentation:     Quality:  DVT Mechanical Prophylaxis:   SCDs,    DVT Pharmacologic Prophylaxis   Medication    heparin (Porcine) 5000 UNIT/ML injection 5,000 Units    DVT Pharmacologic prophylaxis: Aspirin 325 mg           Code Status: DNAR/Selective Treatment  Kirk: External urinary catheter in place  Kirk Duration (in days):   Central line:    EL:     Discharge is dependent on: clinical improvement   At this point Ms. Ramey is expected to be discharge to: tbd    The 21st Century Cures Act makes medical notes like these available to patients in the interest of transparency. Please be advised this is a medical document. Medical documents are intended to carry relevant information, facts as evident, and the clinical opinion of the practitioner. The medical note is intended as peer to peer communication and may appear blunt or direct. It is written in medical language and may contain abbreviations or verbiage that are unfamiliar.              **Certification      PHYSICIAN Certification of Need for Inpatient Hospitalization - Initial Certification    Patient will require inpatient services that will reasonably be expected to span two midnight's based on the clinical documentation in H+P.   Based on patients current state of illness, I anticipate that, after discharge, patient will require TBD.

## 2024-11-28 NOTE — PROGRESS NOTES
NURSING ADMISSION NOTE      Patient admitted via Cart  Oriented to room 520  Safety precautions initiated.  Bed in low position.    Admission navigator completed w/ patient and family at bedside. From saul Buitrago. Updated pt and daughters on POC, all questions and concerns addressed to understanding.  Report endorsed to CEDRIC Walters.     Call light in reach.

## 2024-11-29 LAB
ANION GAP SERPL CALC-SCNC: 7 MMOL/L (ref 0–18)
BUN BLD-MCNC: 17 MG/DL (ref 9–23)
CALCIUM BLD-MCNC: 9.2 MG/DL (ref 8.7–10.4)
CHLORIDE SERPL-SCNC: 104 MMOL/L (ref 98–112)
CO2 SERPL-SCNC: 28 MMOL/L (ref 21–32)
CREAT BLD-MCNC: 1 MG/DL
EGFRCR SERPLBLD CKD-EPI 2021: 54 ML/MIN/1.73M2 (ref 60–?)
GLUCOSE BLD-MCNC: 101 MG/DL (ref 70–99)
OSMOLALITY SERPL CALC.SUM OF ELEC: 290 MOSM/KG (ref 275–295)
POTASSIUM SERPL-SCNC: 4.8 MMOL/L (ref 3.5–5.1)
SODIUM SERPL-SCNC: 139 MMOL/L (ref 136–145)

## 2024-11-29 PROCEDURE — 99232 SBSQ HOSP IP/OBS MODERATE 35: CPT | Performed by: HOSPITALIST

## 2024-11-29 NOTE — CM/SW NOTE
11/29/24 1200   CM/SW Referral Data   Referral Source Social Work (self-referral)   Reason for Referral Discharge planning   Informant Patient;EMR;Clinical Staff Member;Daughter   Medical Hx   Does patient have an established PCP? Yes   Patient Info   Patient's Current Mental Status at Time of Assessment Alert;Oriented   Patient's Home Environment Assisted Living   Post Acute Care Provider Upon Admission [facility]   Patient Status Prior to Admission   Services in place prior to admission DME/Supplies at home   Type of DME/Supplies Wheelchair;Wheeled Walker;Hospital Bed;Recliner Lift Chair   Discharge Needs   Anticipated D/C needs Subacute rehab;To be determined;Transportation services   Services Requested   Submitted to [facility] Yes   PASRR Level 1 Submitted Yes     HOME SITUATION  Type of Home: Assisted living facility (VSB)  Home Layout: One level       Lives With: Staff 24 hours      Prior Level of [place]: Pt and dtr report pt ambulates short distances with RW and therapy.  Pt can transfer with RW and assist x1.  Pt has been working with therapy- was unable to demo seated marching on LLE without severe pain on day of admit.    (Per PT evaluation)      Patient is a 89 y/o female who admitted with Weakness generalized, Acute cystitis without hematuria, Back pain.   Anticipated therapy need: Gradual Rehabilitative Therapy    Met with patient and daughter (Sonia) regarding discharge planning. Patient has lived at [facility] since January/February. She has RW, wheelchair, hospital bed and assistance when needed. She has been needing help with transferring lately. Discussed AC, they are agreeable. She was at [facility] in April 2024. Discussed Medicare coverage for AC including need for medically necessary 3 midnight inpatient hospital stay. Pt was made inpatient status on 11/27 and would need to have a medical need to remain in the hospital until 11/30 in order to have Medicare coverage for  AC. If pt does not meet this criteria, pt could elect to pay privately for NH. Will update Sonia with AC list when available, email address [email or web address].    Will need to send AC referral and complete PASRR. Sent to [facility] for review.    BETSY/CM to remain available for support and/or discharge planning.    ASHLI Segovia  Discharge Planner  [phone]

## 2024-11-29 NOTE — PLAN OF CARE
Problem: Weakness, UTI  Data: Ax03. Koi. Q8 vitals. , on room air. Afebrile. Pain with mobility, scheduled iburpheon Incontinent. Purewick. PT/OT consult. Regular diet. Saline lock. Sandy Steady.   Intervention: heparin, eye drops  Education: Medications, call light , fall precautions   Response: Cooperative with care      Problem: Patient/Family Goals  Goal: Patient/Family Long Term Goal  Description: Patient's Long Term Goal:   Increased mobility before discharge    Interventions:  - Identify barriers to discharge w/pt and caregiver  - Include patient/family/discharge partner in discharge planning  - Arrange for needed discharge resources and transportation as appropriate  - Identify discharge learning needs   - Consider post-discharge preferences of patient/family/discharge partner  - Assess patient's ability to be responsible for managing their own health  - Refer to Case Management Department for coordinating discharge planning if the patient needs post-hospital services  - Follow plan of care  - See additional Care Plan goals for specific interventions  Outcome: Progressing  Goal: Patient/Family Short Term Goal  Description: Patient's Short Term Goal:   11/27 noc: get MRI  11/28: have MRI read  11/29 noc: reduce pain with movement    Interventions:   -  IV rocephin    - See additional Care Plan goals for specific interventions  Outcome: Progressing     Problem: Impaired Functional Mobility  Goal: Achieve highest/safest level of mobility/gait  Description: Interventions:  - Assess patient's functional ability and stability  - Promote increasing activity/tolerance for mobility and gait  - Educate and engage patient/family in tolerated activity level and precautions  - Recommend use of chair position in bed 3 times per day  Outcome: Progressing

## 2024-11-29 NOTE — CONGREGATE LIVING REVIEW
[facility] Living Authorization    The [facility] Review Committee has reviewed this case and the patient IS APPROVED for discharge to a facility for Short Term Skilled once the following procedure is followed:     - The physician discharge instructions (contained within the DYAN note for SNF) must inlcude the below appropriate and approved COVID instructions to the facility    For questions regarding CLRC approval process, please contact the CM assigned to the case.  For questions regarding RN discharge workflow, please contact the unit Clinical Leader.

## 2024-11-29 NOTE — OCCUPATIONAL THERAPY NOTE
OCCUPATIONAL THERAPY EVALUATION - INPATIENT     Room Number: 520/520-A  Evaluation Date: 11/29/2024  Type of Evaluation: Initial  Presenting Problem: General weakness    Physician Order: IP Consult to Occupational Therapy  Reason for Therapy: ADL/IADL Dysfunction and Discharge Planning    OCCUPATIONAL THERAPY ASSESSMENT   Patient is currently functioning below baseline with toileting, bathing, lower body dressing, bed mobility, and transfers. Prior to admission, patient's baseline is mod I ADLs, Harlan for showering.  Patient is requiring maximum assistance as a result of the following impairments: decreased functional strength, decreased endurance, pain, and impaired sitting and standing balance. Occupational Therapy will continue to follow for duration of hospitalization.    Patient will benefit from continued skilled OT Services to promote return to prior level of function and safety with continuous assistance and gradual rehabilitative therapy    History Related to Current Admission: Patient is a 89 year old female admitted on 11/27/2024 with Presenting Problem: General weakness. Co-Morbidities : March 2024 IM nail, dementia    WEIGHT BEARING RESTRICTION       Recommendations for nursing staff:   Transfers: x2 with brett stedy  Toileting location: commode    EVALUATION SESSION:  Patient Start of Session: bed    FUNCTIONAL TRANSFER ASSESSMENT  Sit to Stand: Edge of Bed  Edge of Bed: -- (maxA x 2 brett stedy)  Commode Transfer: -- (maxA x 2 brett stedy)    BED MOBILITY  Supine to Sit : Maximum Assist    BALANCE ASSESSMENT  Static Sitting: Contact Guard Assist    FUNCTIONAL ADL ASSESSMENT  LB Dressing Seated: Dependent  Toileting Standing: Dependent    ACTIVITY TOLERANCE: fair                         O2 SATURATIONS       COGNITION  Overall Cognitive Status:  Impaired  Memory:  decreased long term memory  Following Commands:  follows one step commands with increased time and follows one step commands with  repetition    Upper Extremity   ROM: within functional limits   Strength: within functional limits, grossly 3+/5 throughout   Coordination  Gross motor:   Fine motor:   Sensation:     EDUCATION PROVIDED  Patient Education : Role of Occupational Therapy; Plan of Care; Proper Body Mechanics; Functional Transfer Techniques  Patient's Response to Education: Requires Further Education    Equipment used: brett cantrell  Demonstrates functional use, Would benefit from additional trial      Therapist comments: Pt requiring maxA to transfer to EOB in prep for seated ADLs. Pt demos poor sitting balance, flinging self backwards while performing seated marches with LLE. Max assist required to come back to sitting position. Pt requiring maxA x2 with brett cantrell for sit to stand transfer in preparation for bathroom distance ambulation.     Patient End of Session: Up in chair;Call light within reach;RN aware of session/findings;Alarm set;Needs met    OCCUPATIONAL PROFILE    HOME SITUATION  Type of Home: Assisted living facility (VSB)  Home Layout: One level  Lives With: Staff 24 hours    Toilet and Equipment: Comfort height toilet  Shower/Tub and Equipment: Walk-in shower;Shower chair             Drives: No       Prior Level of Function: short distances with RW, Harlan for ADLs    SUBJECTIVE   Pt calm and cooperative    PAIN ASSESSMENT  Rating: Unable to rate  Location: L side back up to neck  Management Techniques: Activity promotion;Repositioning    OBJECTIVE  Precautions: Bed/chair alarm  Fall Risk: High fall risk    ASSESSMENTS    AM-PAC ‘6-Clicks’ Inpatient Daily Activity Short Form  -   Putting on and taking off regular lower body clothing?: Total  -   Bathing (including washing, rinsing, drying)?: A Lot  -   Toileting, which includes using toilet, bedpan or urinal? : Total  -   Putting on and taking off regular upper body clothing?: A Lot  -   Taking care of personal grooming such as brushing teeth?: A Little  -   Eating meals?: A  Renetta    AM-PAC Score:  Score: 12  Approx Degree of Impairment: 66.57%  Standardized Score (AM-PAC Scale): 30.6    ADDITIONAL TESTS     NEUROLOGICAL FINDINGS      COGNITION ASSESSMENTS     PLAN     OT Treatment Plan: Energy conservation/work simplification techniques;ADL training;Functional transfer training;Patient/Family education;Patient/Family training  Rehab Potential : Guarded  Frequency: 3-5x/week  Number of Visits to Meet Established Goals: 4    ADL Goals   Patient will perform lower body dressing:  with min assist  Patient will perform toileting: with min assist    Functional Transfer Goals  Patient will transfer to bedside commode:  with min assist    Patient Evaluation Complexity Level:   Occupational Profile/Medical History MODERATE - Expanded review of history including review of medical or therapy record   Specific performance deficits impacting engagement in ADL/IADL MODERATE  3 - 5 performance deficits   Client Assessment/Performance Deficits MODERATE - Comorbidities and min to mod modifications of tasks    Clinical Decision Making MODERATE - Analysis of occupational profile, detailed assessments, several treatment options    Overall Complexity MODERATE     OT Session Time: 25 minutes  Self-Care Home Management:  minutes  Therapeutic Activity: 15 minutes  Neuromuscular Re-education:  minutes  Therapeutic Exercise:  minutes  Cognitive Skills:  minutes  Sensory Integrative:  minutes  Orthotic Management and Training:  minutes  Can add/delete any of these

## 2024-11-29 NOTE — PROGRESS NOTES
[facility]   part of [facility]     Hospitalist Progress Note     Valeria Ramey Patient Status:  Inpatient    1934 MRN [id number]   Location [facility] 5NW-A Attending Sukhwinder Robles MD   Hosp Day # 2 PCP JOSIAH VASQUEZ MD     Chief Complaint: back pain and weakness    Subjective:     Patient's daughter and son-in-law at the bedside.  Pain is slightly improved.    Objective:    Review of Systems:   A comprehensive review of systems was completed; pertinent positive and negatives stated in subjective.    Vital signs:  Temp:  [98.3 °F (36.8 °C)-98.5 °F (36.9 °C)] 98.3 °F (36.8 °C)  Pulse:  [72-79] 79  Resp:  [17-18] 17  BP: (122-144)/(67-72) 122/72  SpO2:  [94 %-100 %] 100 %    Physical Exam:    General: No acute distress  Respiratory: No wheezes, no rhonchi  Cardiovascular: S1, S2, regular rate and rhythm  Abdomen: Soft, Non-tender, non-distended, positive bowel sounds  Neuro: No new focal deficits.   Extremities: No edema      Diagnostic Data:    Labs:  Recent Labs   Lab 24  1402 24  0836   WBC 11.1* 7.2   HGB 13.3 13.0   MCV 98.8 97.5   .0 296.0   INR 0.98  --        Recent Labs   Lab 24  1402 24  0719 24  0552   GLU 98 86 101*   BUN 13 9 17   CREATSERUM 1.04* 0.73 1.00   CA 9.1 8.9 9.2   ALB 4.0  --   --     135* 139   K 4.5 4.4 4.8    106 104   CO2 28.0 22.0 28.0   ALKPHO 84  --   --    AST 15  --   --    ALT 8*  --   --    BILT 0.3  --   --    TP 6.6  --   --        Estimated Creatinine Clearance: 32.3 mL/min (based on SCr of 1 mg/dL).    No results for input(s): \"TROP\", \"TROPHS\", \"CK\" in the last 168 hours.    Recent Labs   Lab 24  1402   PTP 13.1   INR 0.98                  Microbiology    Hospital Encounter on 24   1. Blood Culture     Status: None (Preliminary result)    Collection Time: 24  5:22 PM    Specimen: Blood,peripheral   Result Value Ref Range    Blood Culture Result No Growth 1 Day N/A   2. Urine Culture,  Routine     Status: Abnormal    Collection Time: 11/27/24  2:30 PM    Specimen: Urine, clean catch   Result Value Ref Range    Urine Culture >100,000 CFU/ML Escherichia coli (A) N/A       Susceptibility    Escherichia coli -  (no method available)     Ampicillin <=2 Sensitive      Cefazolin <=4 Sensitive      Ciprofloxacin <=0.25 Sensitive      Gentamicin <=1 Sensitive      Meropenem <=0.25 Sensitive      Levofloxacin <=0.12 Sensitive      Nitrofurantoin <=16 Sensitive      Piperacillin + Tazobactam <=4 Sensitive      Trimethoprim/Sulfa <=20 Sensitive          Imaging: Reviewed in Epic.    Medications:    ibuprofen  600 mg Oral TID    famotidine  20 mg Oral Daily    amitriptyline  50 mg Oral Nightly    atorvastatin  10 mg Oral Daily    escitalopram  10 mg Oral Daily    donepezil  5 mg Oral Nightly    heparin  5,000 Units Subcutaneous Q8H KAR    cefTRIAXone  1 g Intravenous Q24H    latanoprost  1 drop Both Eyes Nightly    dorzolamide-timolol  1 drop Both Eyes BID    melatonin  1.5 mg Oral Nightly       Assessment & Plan:      #Gen Weakness  #Bilateral leg weakness  No fractures seen on imaging  PT/OT following, possible inpatient rehab placement  ? Deconditioning, effects of UTI  MRI of lumbar region (11/28) reviewed, neg for acute process  pt has hx of kypho to T8 in April 2024     #UTI, Ecoli   Cont iv Ceftriaxone (11/27-) while IP, adjust to po abx on dc  Bcx NG to date / Ucx reviewed     #Dyslipidemia  statin     #Depression/Anxiety  Continue home meds     #Dementia?  On aricept     #Splenic Artery Aneurysm  Incidental finding  Vascular white as outpatient, no abd pain      Ravi Veliz,     Supplementary Documentation:     Quality:  DVT Mechanical Prophylaxis:   SCDs,    DVT Pharmacologic Prophylaxis   Medication    heparin (Porcine) 5000 UNIT/ML injection 5,000 Units    DVT Pharmacologic prophylaxis: Aspirin 325 mg           Code Status: DNAR/Selective Treatment  Kirk: External urinary catheter in place  Kirk  Duration (in days):   Central line:    EL:     Discharge is dependent on: clinical improvement   At this point Ms. Ramey is expected to be discharge to: tbd    The 21st Century Cures Act makes medical notes like these available to patients in the interest of transparency. Please be advised this is a medical document. Medical documents are intended to carry relevant information, facts as evident, and the clinical opinion of the practitioner. The medical note is intended as peer to peer communication and may appear blunt or direct. It is written in medical language and may contain abbreviations or verbiage that are unfamiliar.              **Certification      PHYSICIAN Certification of Need for Inpatient Hospitalization - Initial Certification    Patient will require inpatient services that will reasonably be expected to span two midnight's based on the clinical documentation in H+P.   Based on patients current state of illness, I anticipate that, after discharge, patient will require TBD.

## 2024-11-29 NOTE — PHYSICAL THERAPY NOTE
PHYSICAL THERAPY EVALUATION - INPATIENT     Room Number: 520/520-A  Evaluation Date: 11/29/2024  Type of Evaluation: Initial  Physician Order: PT Eval and Treat    Presenting Problem: pain- L lower back     Reason for Therapy: Mobility Dysfunction and Discharge Planning    PHYSICAL THERAPY ASSESSMENT   Patient is a 89 year old female admitted 11/27/2024 for weakness, pain.  Prior to admission, patient's baseline is ambulatory short distances with RW.  Patient is currently functioning below baseline with bed mobility, transfers, and gait.  Patient is requiring moderate assist and maximum assist as a result of the following impairments: pain.  Physical Therapy will continue to follow for duration of hospitalization.    Patient will benefit from continued skilled PT Services to promote return to prior level of function and safety with continuous assistance and gradual rehabilitative therapy .    PLAN DURING HOSPITALIZATION  Nursing Mobility Recommendation : Lift Equipment     PT Treatment Plan: Bed mobility;Body mechanics;Endurance;Energy conservation;Patient education;Family education;Gait training;Range of motion;Stair training;Transfer training;Balance training  Rehab Potential : Fair  Frequency (Obs):  (2-3x/week)     CURRENT GOALS    Goal #1 Patient is able to demonstrate supine - sit EOB @ level: supervision     Goal #2 Patient is able to demonstrate transfers Sit to/from Stand at assistance level: minimum assistance     Goal #3 Patient is able to ambulate 10 feet with assist device: walker - rolling at assistance level: supervision     Goal #4    Goal #5    Goal #6    Goal Comments: Goals established on 11/29/2024      PHYSICAL THERAPY MEDICAL/SOCIAL HISTORY  History related to current admission: Patient is a 89 year old female admitted on 11/27/2024 from [facility] for pain in back with LLE movement.    MRI 11/28/24, \"CONCLUSION:  There are mild to moderate multilevel degenerative changes of the lumbar spine present.   No acute abnormality is seen within the visualized lumbar spine at this time.\"    HOME SITUATION  Type of Home: Assisted living facility (VSB)  Home Layout: One level                     Lives With: Staff 24 hours               Prior Level of [place]: Pt and dtr report pt ambulates short distances with RW and therapy.  Pt can transfer with RW and assist x1.  Pt has been working with therapy- was unable to demo seated marching on LLE without severe pain on day of admit.      SUBJECTIVE  \"You woke me up!\"    OBJECTIVE  Precautions: Bed/chair alarm  Fall Risk: High fall risk    WEIGHT BEARING RESTRICTION     PAIN ASSESSMENT  Rating: Unable to rate  Location: initially L neck and shoulder into arm;  high twinges of pain with L hip flexion  Management Techniques: Activity promotion;Body mechanics;Relaxation;Repositioning    COGNITION  Following Commands:  follows all commands and directions without difficulty    RANGE OF MOTION AND STRENGTH ASSESSMENT  Upper extremity ROM and strength are within functional limits     Lower extremity ROM is within functional limits     Lower extremity strength- not formally tested, demos >3/5 bilaterally.        BALANCE  Static Sitting: Good  Dynamic Sitting: Good  Static Standing: Fair -  Dynamic Standing: Poor -    ADDITIONAL TESTS                                    ACTIVITY TOLERANCE                         O2 WALK       NEUROLOGICAL FINDINGS                        AM-PAC '6-Clicks' INPATIENT SHORT FORM - BASIC MOBILITY  How much difficulty does the patient currently have...  Patient Difficulty: Turning over in bed (including adjusting bedclothes, sheets and blankets)?: A Little   Patient Difficulty: Sitting down on and standing up from a chair with arms (e.g., wheelchair, bedside commode, etc.): A Lot   Patient Difficulty: Moving from lying on back to sitting on the side of the bed?: A Lot   How much help from another person does the patient currently need...   Help from Another:  Moving to and from a bed to a chair (including a wheelchair)?: A Lot   Help from Another: Need to walk in hospital room?: A Lot   Help from Another: Climbing 3-5 steps with a railing?: A Lot     AM-PAC Score:  Raw Score: 13   Approx Degree of Impairment: 64.91%   Standardized Score (AM-PAC Scale): 36.74   CMS Modifier (G-Code): CL    FUNCTIONAL ABILITY STATUS  Gait Assessment        Skilled Therapy Provided     Bed Mobility:  Rolling: mod A  Supine to sit: mod A   Sit to supine: NT     Transfer Mobility:  Sit to stand: max A x2 with brett stedy   Stand to sit: mod A x2 from brett stedy  Gait = NT    Therapist's Comments: While seated at eob and checking ROM, noted pt to have strong twinge of pain with LLE hip flexion- causing pt to fling backwards.  Max A required to regain balance, not noted when performing knee extension.  Difficulty and grimacing with sit to stand within brett stedy, not as prominent with stand to sit.  Encouraged gentle ROM of LE including marching, hip int/ext rotation.      Exercise/Education Provided:  Bed mobility  Body mechanics  Functional activity tolerated  ROM  Transfer training    Patient End of Session: Up in chair;Needs met;Call light within reach;RN aware of session/findings;All patient questions and concerns addressed;Family present      Patient Evaluation Complexity Level:  History Moderate - 1 or 2 personal factors and/or co-morbidities   Examination of body systems Moderate - addressing a total of 3 or more elements   Clinical Presentation  Moderate - Evolving   Clinical Decision Making Moderate Complexity       PT Session Time: 25 minutes    Therapeutic Activity: 15 minutes

## 2024-11-30 VITALS
SYSTOLIC BLOOD PRESSURE: 142 MMHG | WEIGHT: 170 LBS | RESPIRATION RATE: 16 BRPM | DIASTOLIC BLOOD PRESSURE: 67 MMHG | BODY MASS INDEX: 29.02 KG/M2 | HEART RATE: 71 BPM | TEMPERATURE: 98 F | OXYGEN SATURATION: 95 % | HEIGHT: 64 IN

## 2024-11-30 PROCEDURE — 99239 HOSP IP/OBS DSCHRG MGMT >30: CPT | Performed by: HOSPITALIST

## 2024-11-30 RX ORDER — CEPHALEXIN 500 MG/1
500 CAPSULE ORAL 4 TIMES DAILY
Qty: 16 CAPSULE | Refills: 0 | Status: SHIPPED | OUTPATIENT
Start: 2024-11-30 | End: 2024-12-04

## 2024-11-30 NOTE — PLAN OF CARE
NURSING DISCHARGE NOTE    Discharged Rehab facility via Ambulance.  Accompanied by Support staff  Belongings Taken by patient/family.      Discharge instructions explained to patient at bedside. IV removed. Patient's belongings taken by patient + ambulance service. No further questions at this time. Report called over to [facility] RN.       Problem: Patient/Family Goals  Goal: Patient/Family Long Term Goal  Description: Patient's Long Term Goal:   Increased mobility before discharge    Interventions:  - Identify barriers to discharge w/pt and caregiver  - Include patient/family/discharge partner in discharge planning  - Arrange for needed discharge resources and transportation as appropriate  - Identify discharge learning needs   - Consider post-discharge preferences of patient/family/discharge partner  - Assess patient's ability to be responsible for managing their own health  - Refer to Case Management Department for coordinating discharge planning if the patient needs post-hospital services  - Follow plan of care  - See additional Care Plan goals for specific interventions  Outcome: Progressing  Goal: Patient/Family Short Term Goal  Description: Patient's Short Term Goal:   11/27 noc: get MRI  11/28: have MRI read  11/29 noc: reduce pain with movement  11/29/2024 - pain control    Interventions:   -  IV rocephin  - scheduled morphine and PRN Baclofen as ordered    - See additional Care Plan goals for specific interventions  Outcome: Progressing     Problem: Impaired Functional Mobility  Goal: Achieve highest/safest level of mobility/gait  Description: Interventions:  - Assess patient's functional ability and stability  - Promote increasing activity/tolerance for mobility and gait  - Educate and engage patient/family in tolerated activity level and precautions    Outcome: Progressing

## 2024-11-30 NOTE — PLAN OF CARE
Problem: Patient/Family Goals  Goal: Patient/Family Long Term Goal  Description: Patient's Long Term Goal:   Increased mobility before discharge    Interventions:  - Identify barriers to discharge w/pt and caregiver  - Include patient/family/discharge partner in discharge planning  - Arrange for needed discharge resources and transportation as appropriate  - Identify discharge learning needs   - Consider post-discharge preferences of patient/family/discharge partner  - Assess patient's ability to be responsible for managing their own health  - Refer to Case Management Department for coordinating discharge planning if the patient needs post-hospital services  - Follow plan of care  - See additional Care Plan goals for specific interventions  Outcome: Progressing  Goal: Patient/Family Short Term Goal  Description: Patient's Short Term Goal:   11/27 noc: get MRI  11/28: have MRI read  11/29 noc: reduce pain with movement  11/29/2024 - pain control    Interventions:   -  IV rocephin  - scheduled morphine and PRN Baclofen as ordered    - See additional Care Plan goals for specific interventions  Outcome: Progressing     Problem: Impaired Functional Mobility  Goal: Achieve highest/safest level of mobility/gait  Description: Interventions:  - Assess patient's functional ability and stability  - Promote increasing activity/tolerance for mobility and gait  - Educate and engage patient/family in tolerated activity level and precautions  - Recommend use of sit-stand lift for transfers  Outcome: Progressing

## 2024-11-30 NOTE — PLAN OF CARE
Problem: Patient/Family Goals  Goal: Patient/Family Long Term Goal  Description: Patient's Long Term Goal:   Increased mobility before discharge    Interventions:  - Identify barriers to discharge w/pt and caregiver  - Include patient/family/discharge partner in discharge planning  - Arrange for needed discharge resources and transportation as appropriate  - Identify discharge learning needs   - Consider post-discharge preferences of patient/family/discharge partner  - Assess patient's ability to be responsible for managing their own health  - Refer to Case Management Department for coordinating discharge planning if the patient needs post-hospital services  - Follow plan of care  - See additional Care Plan goals for specific interventions  Outcome: Progressing  Goal: Patient/Family Short Term Goal  Description: Patient's Short Term Goal:   11/27 noc: get MRI  11/28: have MRI read  11/29 noc: reduce pain with movement    Interventions:   -  IV rocephin    - See additional Care Plan goals for specific interventions  Outcome: Progressing     Problem: Impaired Functional Mobility  Goal: Achieve highest/safest level of mobility/gait  Description: Interventions:  - Assess patient's functional ability and stability  - Promote increasing activity/tolerance for mobility and gait  - Educate and engage patient/family in tolerated activity level and precautions    Outcome: Progressing

## 2024-11-30 NOTE — CM/SW NOTE
Emailed AC list to daughter at [email or web address]. Will follow up on choice.    SW/CM to remain available for support and/or discharge planning.    ASHLI Segovia  Discharge Planner  [phone]

## 2024-11-30 NOTE — DISCHARGE SUMMARY
[facility]  DISCHARGE SUMMARY     Valeria Ramey Patient Status:  Inpatient    1934 MRN [id number]   Location [facility]-A Attending No att. providers found   Hosp Day # 3 PCP JOSIAH VASQUEZ MD     Date of Admission: 2024  Date of Discharge:  2024     Discharge Disposition: SNF Subacute Rehab    Discharge Diagnosis:  #Gen Weakness  #Bilateral leg weakness  No fractures seen on imaging  PT/OT following, inpatient rehab placement  ? Deconditioning, effects of UTI  MRI of lumbar region () reviewed, neg for acute process  pt has hx of kypho to T8 in 2024     #UTI, Ecoli   Cont iv Ceftriaxone (-) while IP, adjust to po abx on dc  Bcx NG to date / Ucx reviewed, pan S     #Dyslipidemia  statin     #Depression/Anxiety  Continue home meds     #Dementia?  On aricept     #Splenic Artery Aneurysm  Incidental finding  Vascular white as outpatient, no abd pain       History of Present Illness: Valeria Ramey is a 89 year old female with PMhx of anxiety, depression, DL presents back pain, leg weakness. Pt has had recent hip fracture and tib fib fracture on R side over the past few months. She has been working with therapy at her assisted living. Today she comes in with worsening back pain and bilateral leg weakness. She does have history of kyphoplasty in the past. She denies any falls or trauma. She denies any bowel incontinency. She denies any CP or SOB. When asked which side hurts she states the opposite side of what she told family. She denies any F/C. No urinary complaints. In ED she was found to have UTI.        Brief Synopsis: pt w/ weakness. Found to have ecoli UTI. Bcxs neg. Ucx pan S ecoli. Improving. Pt qualified for inpt rehab. Ok to DC. D/w dtr.     Lace+ Score: 74  59-90 High Risk  29-58 Medium Risk  0-28   Low Risk       TCM Follow-Up Recommendation:  LACE > 58: High Risk of readmission after discharge from the hospital.      Procedures during hospitalization:    none    Incidental or significant findings and recommendations (brief descriptions):  none    Lab/Test results pending at Discharge:   none    Consultants:  none    Discharge Medication List:     Discharge Medications        START taking these medications        Instructions Prescription details   cephALEXin 500 MG Caps  Commonly known as: Keflex      Take 1 capsule (500 mg total) by mouth 4 (four) times daily for 4 days.   Stop taking on: December 4, 2024  Quantity: 16 capsule  Refills: 0            CONTINUE taking these medications        Instructions Prescription details   acetaminophen 500 MG Tabs  Commonly known as: Tylenol Extra Strength      Take 2 tablets (1,000 mg total) by mouth 3 (three) times daily as needed for Pain.   Refills: 0     amitriptyline 50 MG Tabs  Commonly known as: Elavil      Take 1 tablet (50 mg total) by mouth nightly.   Refills: 0     aspirin 325 MG Tabs      Take 1 tablet (325 mg total) by mouth daily.   Refills: 0     atorvastatin 10 MG Tabs  Commonly known as: Lipitor      Take 1 tablet (10 mg total) by mouth daily.   Refills: 0     baclofen 10 MG Tabs  Commonly known as: Lioresal      Take 1 tablet (10 mg total) by mouth every 12 (twelve) hours as needed (spasms).   Refills: 0     CALCIUM 600-D OR      Take 1 capsule by mouth once daily.   Refills: 0     citalopram 20 MG Tabs  Commonly known as: CeleXA      Take 1 tablet (20 mg total) by mouth daily.   Refills: 0     CRANBERRY EXTRACT OR      Take 1 capsule by mouth 2 (two) times daily.   Refills: 0     donepezil 5 MG Tabs  Commonly known as: Aricept      Take 1 tablet (5 mg total) by mouth at bedtime.   Refills: 0     dorzolamide-timolol 2-0.5 % Soln  Commonly known as: Cosopt      Place 1 drop into both eyes 2 (two) times daily.   Refills: 0     ferrous sulfate 325 (65 FE) MG Tbec      Take 1 tablet (325 mg total) by mouth once daily.   Refills: 0     HYDROcodone-acetaminophen 5-325 MG Tabs  Commonly known as: Norco      Take 1  tablet by mouth every 4 (four) hours as needed.   Quantity: 24 tablet  Refills: 0     latanoprost 0.005 % Soln  Commonly known as: Xalatan      Place 1 drop into both eyes nightly.   Refills: 0     Loratadine 10 MG Caps      Take 10 mg by mouth daily as needed.   Refills: 0     melatonin 1 MG Tabs      Take 1.5 tablets (1.5 mg total) by mouth daily.   Refills: 0     midodrine 5 MG Tabs  Commonly known as: ProAmatine      Take 1 tablet (5 mg total) by mouth daily as needed (If systolic Blood pressure less than 120).   Refills: 0     multivitamin Tabs      Take 1 tablet by mouth daily.   Refills: 0     ondansetron 4 mg tablet  Commonly known as: Zofran      Take 1 tablet (4 mg total) by mouth every 6 (six) hours as needed for Nausea.   Refills: 0     Polyethylene Glycol 3350 17 g Pack  Commonly known as: MIRALAX      Take 17 g by mouth daily.   Quantity: 14 each  Refills: 0               Where to Get Your Medications        Please  your prescriptions at the location directed by your doctor or nurse    Bring a paper prescription for each of these medications  cephALEXin 500 MG Caps         [place]    Follow-up appointment:   Jimmy Brown MD  [address]  [address]  [facility] [address] [phone]    Schedule an appointment as soon as possible for a visit      Appointments for Next 30 Days 2024 - 2024      None            Vital signs:  Temp:  [98 °F (36.7 °C)-98.6 °F (37 °C)] 98 °F (36.7 °C)  Pulse:  [71-98] 71  Resp:  [16-17] 16  BP: (108-142)/(56-67) 142/67  SpO2:  [95 %-96 %] 95 %    Physical Exam:    General: No acute distress   Lungs: clear to auscultation  Cardiovascular: S1, S2  Abdomen: Soft      -----------------------------------------------------------------------------------------------  PATIENT DISCHARGE INSTRUCTIONS: See electronic chart    Michael Cantu MD    Total time spent on discharge plannin minutes     The  Cures Act makes medical notes like  these available to patients in the interest of transparency. Please be advised this is a medical document. Medical documents are intended to carry relevant information, facts as evident, and the clinical opinion of the practitioner. The medical note is intended as peer to peer communication and may appear blunt or direct. It is written in medical language and may contain abbreviations or verbiage that are unfamiliar.

## 2024-11-30 NOTE — CM/SW NOTE
11/30/24 1138   Choice of Post-Acute Provider   Informed patient of right to choose their preferred provider Yes   List of appropriate post-acute services provided to patient/family with quality data Yes   Patient/family choice [facility]   Information given to Daughter     Spoke with patient's daughter, they reviewed AC list and chose [facility].  Reserved in aidin.   Updated RN. Await patient to be medically cleared for discharge.     SW/CM to remain available for support and/or discharge planning.    Addendum 12:10am:  Informed by RN that patient is medically cleared for discharge. [facility] bed [facility]. Spoke with [facility] and scheduled  for 2pm today. PCS form completed and available for RN to print. Spoke with patient's daughter who is agreeable with discharge. SW will remain available.    [facility]  Phone: [phone]    [facility]  [phone] or [phone]        ASHLI Segovia  Discharge Planner  [phone]

## 2025-02-03 ENCOUNTER — APPOINTMENT (OUTPATIENT)
Dept: GENERAL RADIOLOGY | Age: 89
DRG: 493 | End: 2025-02-03
Attending: STUDENT IN AN ORGANIZED HEALTH CARE EDUCATION/TRAINING PROGRAM

## 2025-02-03 ENCOUNTER — HOSPITAL ENCOUNTER (INPATIENT)
Age: 89
Discharge: NURSING, REHAB OR LONG-TERM CARE | DRG: 493 | End: 2025-02-03
Attending: STUDENT IN AN ORGANIZED HEALTH CARE EDUCATION/TRAINING PROGRAM | Admitting: HOSPITALIST

## 2025-02-03 ENCOUNTER — APPOINTMENT (OUTPATIENT)
Dept: CT IMAGING | Age: 89
DRG: 493 | End: 2025-02-03
Attending: STUDENT IN AN ORGANIZED HEALTH CARE EDUCATION/TRAINING PROGRAM

## 2025-02-03 DIAGNOSIS — S82.892A CLOSED FRACTURE OF LEFT ANKLE, INITIAL ENCOUNTER: Primary | ICD-10-CM
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
## 2025-02-03 LAB
ABO + RH BLD: NORMAL
ANION GAP SERPL CALC-SCNC: 9 MMOL/L (ref 7–19)
APTT PPP: 27 SEC (ref 22–32)
BASOPHILS # BLD: 0.1 K/MCL (ref 0–0.3)
BASOPHILS NFR BLD: 1 %
BLD GP AB SCN SERPL QL GEL: NEGATIVE
BUN SERPL-MCNC: 18 MG/DL (ref 6–20)
BUN/CREAT SERPL: 23 (ref 7–25)
CALCIUM SERPL-MCNC: 9.1 MG/DL (ref 8.4–10.2)
CHLORIDE SERPL-SCNC: 105 MMOL/L (ref 97–110)
CO2 SERPL-SCNC: 24 MMOL/L (ref 21–32)
CREAT SERPL-MCNC: 0.8 MG/DL (ref 0.51–0.95)
DEPRECATED RDW RBC: 50.4 FL (ref 39–50)
EGFRCR SERPLBLD CKD-EPI 2021: 70 ML/MIN/{1.73_M2}
EOSINOPHIL # BLD: 0.8 K/MCL (ref 0–0.5)
EOSINOPHIL NFR BLD: 8 %
ERYTHROCYTE [DISTWIDTH] IN BLOOD: 13.8 % (ref 11–15)
FASTING DURATION TIME PATIENT: ABNORMAL H
GLUCOSE SERPL-MCNC: 105 MG/DL (ref 70–99)
HCT VFR BLD CALC: 42.6 % (ref 36–46.5)
HGB BLD-MCNC: 13.2 G/DL (ref 12–15.5)
IMM GRANULOCYTES # BLD AUTO: 0.1 K/MCL (ref 0–0.2)
IMM GRANULOCYTES # BLD: 1 %
INR PPP: 1
LYMPHOCYTES # BLD: 2.2 K/MCL (ref 1–4)
LYMPHOCYTES NFR BLD: 20 %
MCH RBC QN AUTO: 30.6 PG (ref 26–34)
MCHC RBC AUTO-ENTMCNC: 31 G/DL (ref 32–36.5)
MCV RBC AUTO: 98.6 FL (ref 78–100)
MONOCYTES # BLD: 0.9 K/MCL (ref 0.3–0.9)
MONOCYTES NFR BLD: 8 %
NEUTROPHILS # BLD: 6.8 K/MCL (ref 1.8–7.7)
NEUTROPHILS NFR BLD: 62 %
NRBC BLD MANUAL-RTO: 0 /100 WBC
PLATELET # BLD AUTO: 352 K/MCL (ref 140–450)
POTASSIUM SERPL-SCNC: 4.7 MMOL/L (ref 3.4–5.1)
PROTHROMBIN TIME: 10.3 SEC (ref 9.7–11.8)
RBC # BLD: 4.32 MIL/MCL (ref 4–5.2)
SODIUM SERPL-SCNC: 133 MMOL/L (ref 135–145)
TYPE AND SCREEN EXPIRATION DATE: NORMAL
WBC # BLD: 10.9 K/MCL (ref 4.2–11)

## 2025-02-03 PROCEDURE — 70450 CT HEAD/BRAIN W/O DYE: CPT

## 2025-02-03 PROCEDURE — 85610 PROTHROMBIN TIME: CPT | Performed by: STUDENT IN AN ORGANIZED HEALTH CARE EDUCATION/TRAINING PROGRAM

## 2025-02-03 PROCEDURE — 99152 MOD SED SAME PHYS/QHP 5/>YRS: CPT

## 2025-02-03 PROCEDURE — 73610 X-RAY EXAM OF ANKLE: CPT

## 2025-02-03 PROCEDURE — 73562 X-RAY EXAM OF KNEE 3: CPT

## 2025-02-03 PROCEDURE — 85025 COMPLETE CBC W/AUTO DIFF WBC: CPT | Performed by: STUDENT IN AN ORGANIZED HEALTH CARE EDUCATION/TRAINING PROGRAM

## 2025-02-03 PROCEDURE — 86850 RBC ANTIBODY SCREEN: CPT | Performed by: STUDENT IN AN ORGANIZED HEALTH CARE EDUCATION/TRAINING PROGRAM

## 2025-02-03 PROCEDURE — 96374 THER/PROPH/DIAG INJ IV PUSH: CPT

## 2025-02-03 PROCEDURE — 85730 THROMBOPLASTIN TIME PARTIAL: CPT | Performed by: STUDENT IN AN ORGANIZED HEALTH CARE EDUCATION/TRAINING PROGRAM

## 2025-02-03 PROCEDURE — 72170 X-RAY EXAM OF PELVIS: CPT

## 2025-02-03 PROCEDURE — 80048 BASIC METABOLIC PNL TOTAL CA: CPT | Performed by: STUDENT IN AN ORGANIZED HEALTH CARE EDUCATION/TRAINING PROGRAM

## 2025-02-03 PROCEDURE — 72125 CT NECK SPINE W/O DYE: CPT

## 2025-02-03 PROCEDURE — 10002800 HB RX 250 W HCPCS: Performed by: STUDENT IN AN ORGANIZED HEALTH CARE EDUCATION/TRAINING PROGRAM

## 2025-02-03 PROCEDURE — 99285 EMERGENCY DEPT VISIT HI MDM: CPT

## 2025-02-03 RX ORDER — DORZOLAMIDE HYDROCHLORIDE AND TIMOLOL MALEATE 20; 5 MG/ML; MG/ML
1 SOLUTION/ DROPS OPHTHALMIC 2 TIMES DAILY
COMMUNITY

## 2025-02-03 RX ORDER — POLYETHYLENE GLYCOL 3350 17 G/17G
17 POWDER, FOR SOLUTION ORAL DAILY
COMMUNITY

## 2025-02-03 RX ORDER — ACETAMINOPHEN 500 MG
1000 TABLET ORAL 3 TIMES DAILY PRN
COMMUNITY

## 2025-02-03 RX ORDER — GUAIFENESIN 200 MG/10ML
LIQUID ORAL
COMMUNITY
End: 2025-02-04

## 2025-02-03 RX ORDER — MIDODRINE HYDROCHLORIDE 5 MG/1
5 TABLET ORAL DAILY
COMMUNITY
End: 2025-02-04

## 2025-02-03 RX ORDER — ONDANSETRON 2 MG/ML
4 INJECTION INTRAMUSCULAR; INTRAVENOUS ONCE
Status: COMPLETED | OUTPATIENT
Start: 2025-02-04 | End: 2025-02-03

## 2025-02-03 RX ORDER — LORATADINE 10 MG/1
10 TABLET ORAL DAILY
COMMUNITY

## 2025-02-03 RX ORDER — AMITRIPTYLINE HYDROCHLORIDE 50 MG/1
1 TABLET ORAL NIGHTLY
COMMUNITY

## 2025-02-03 RX ORDER — BACLOFEN 10 MG/1
10 TABLET ORAL EVERY 12 HOURS PRN
COMMUNITY

## 2025-02-03 RX ORDER — ATORVASTATIN CALCIUM 10 MG/1
1 TABLET, FILM COATED ORAL DAILY
COMMUNITY

## 2025-02-03 RX ORDER — DONEPEZIL HYDROCHLORIDE 5 MG/1
5 TABLET, FILM COATED ORAL NIGHTLY
COMMUNITY

## 2025-02-03 RX ORDER — KETAMINE HCL IN NACL, ISO-OSM 100MG/10ML
0.5 SYRINGE (ML) INJECTION ONCE
Status: COMPLETED | OUTPATIENT
Start: 2025-02-03 | End: 2025-02-04

## 2025-02-03 RX ORDER — ONDANSETRON 4 MG/1
4 TABLET, ORALLY DISINTEGRATING ORAL EVERY 8 HOURS PRN
COMMUNITY
End: 2025-02-04

## 2025-02-03 RX ORDER — PROPOFOL 10 MG/ML
0.5 INJECTION, EMULSION INTRAVENOUS ONCE
Status: COMPLETED | OUTPATIENT
Start: 2025-02-03 | End: 2025-02-04

## 2025-02-03 RX ORDER — CITALOPRAM HYDROBROMIDE 20 MG/1
20 TABLET ORAL DAILY
COMMUNITY

## 2025-02-03 RX ORDER — FERROUS SULFATE 325(65) MG
325 TABLET ORAL
COMMUNITY

## 2025-02-03 RX ORDER — B-COMPLEX WITH VITAMIN C
1 TABLET ORAL DAILY
COMMUNITY

## 2025-02-03 RX ORDER — LATANOPROST 50 UG/ML
1 SOLUTION/ DROPS OPHTHALMIC NIGHTLY
COMMUNITY

## 2025-02-03 RX ADMIN — ONDANSETRON 4 MG: 2 INJECTION INTRAMUSCULAR; INTRAVENOUS at 23:51

## 2025-02-03 SDOH — SOCIAL STABILITY: SOCIAL INSECURITY: HOW OFTEN DOES ANYONE, INCLUDING FAMILY AND FRIENDS, PHYSICALLY HURT YOU?: NEVER

## 2025-02-03 SDOH — SOCIAL STABILITY: SOCIAL INSECURITY: HOW OFTEN DOES ANYONE, INCLUDING FAMILY AND FRIENDS, SCREAM OR CURSE AT YOU?: NEVER

## 2025-02-03 SDOH — SOCIAL STABILITY: SOCIAL INSECURITY: HOW OFTEN DOES ANYONE, INCLUDING FAMILY AND FRIENDS, THREATEN YOU WITH HARM?: NEVER

## 2025-02-03 SDOH — SOCIAL STABILITY: SOCIAL INSECURITY: HOW OFTEN DOES ANYONE, INCLUDING FAMILY AND FRIENDS, INSULT OR TALK DOWN TO YOU?: NEVER

## 2025-02-03 ASSESSMENT — PAIN SCALES - GENERAL: PAINLEVEL_OUTOF10: 3

## 2025-02-04 ENCOUNTER — ANESTHESIA EVENT (OUTPATIENT)
Dept: SURGERY | Age: 89
End: 2025-02-04

## 2025-02-04 ENCOUNTER — APPOINTMENT (OUTPATIENT)
Dept: GENERAL RADIOLOGY | Age: 89
DRG: 493 | End: 2025-02-04
Attending: STUDENT IN AN ORGANIZED HEALTH CARE EDUCATION/TRAINING PROGRAM

## 2025-02-04 ENCOUNTER — ANESTHESIA (OUTPATIENT)
Dept: SURGERY | Age: 89
End: 2025-02-04

## 2025-02-04 PROBLEM — S82.892A CLOSED LEFT ANKLE FRACTURE, INITIAL ENCOUNTER: Status: ACTIVE | Noted: 2025-02-04

## 2025-02-04 LAB
ANION GAP SERPL CALC-SCNC: 11 MMOL/L (ref 7–19)
ATRIAL RATE (BPM): 77
BASOPHILS # BLD: 0 K/MCL (ref 0–0.3)
BASOPHILS NFR BLD: 0 %
BUN SERPL-MCNC: 17 MG/DL (ref 6–20)
BUN/CREAT SERPL: 25 (ref 7–25)
CALCIUM SERPL-MCNC: 8.7 MG/DL (ref 8.4–10.2)
CHLORIDE SERPL-SCNC: 107 MMOL/L (ref 97–110)
CO2 SERPL-SCNC: 21 MMOL/L (ref 21–32)
CREAT SERPL-MCNC: 0.67 MG/DL (ref 0.51–0.95)
DEPRECATED RDW RBC: 48.8 FL (ref 39–50)
EGFRCR SERPLBLD CKD-EPI 2021: 83 ML/MIN/{1.73_M2}
EOSINOPHIL # BLD: 0.6 K/MCL (ref 0–0.5)
EOSINOPHIL NFR BLD: 6 %
ERYTHROCYTE [DISTWIDTH] IN BLOOD: 13.9 % (ref 11–15)
FASTING DURATION TIME PATIENT: NORMAL H
GLUCOSE SERPL-MCNC: 94 MG/DL (ref 70–99)
HCT VFR BLD CALC: 39.2 % (ref 36–46.5)
HGB BLD-MCNC: 12.6 G/DL (ref 12–15.5)
IMM GRANULOCYTES # BLD AUTO: 0.1 K/MCL (ref 0–0.2)
IMM GRANULOCYTES # BLD: 1 %
LYMPHOCYTES # BLD: 1.8 K/MCL (ref 1–4)
LYMPHOCYTES NFR BLD: 18 %
MAGNESIUM SERPL-MCNC: 2.2 MG/DL (ref 1.7–2.4)
MCH RBC QN AUTO: 30.6 PG (ref 26–34)
MCHC RBC AUTO-ENTMCNC: 32.1 G/DL (ref 32–36.5)
MCV RBC AUTO: 95.1 FL (ref 78–100)
MONOCYTES # BLD: 0.8 K/MCL (ref 0.3–0.9)
MONOCYTES NFR BLD: 8 %
NEUTROPHILS # BLD: 6.8 K/MCL (ref 1.8–7.7)
NEUTROPHILS NFR BLD: 67 %
NRBC BLD MANUAL-RTO: 0 /100 WBC
P AXIS (DEGREES): 68
PLATELET # BLD AUTO: 326 K/MCL (ref 140–450)
POTASSIUM SERPL-SCNC: 4.3 MMOL/L (ref 3.4–5.1)
PR-INTERVAL (MSEC): 214
QRS-INTERVAL (MSEC): 68
QT-INTERVAL (MSEC): 384
QTC: 435
R AXIS (DEGREES): 10
RAINBOW EXTRA TUBES HOLD SPECIMEN: NORMAL
RBC # BLD: 4.12 MIL/MCL (ref 4–5.2)
REPORT TEXT: NORMAL
SODIUM SERPL-SCNC: 135 MMOL/L (ref 135–145)
T AXIS (DEGREES): 23
VENTRICULAR RATE EKG/MIN (BPM): 77
WBC # BLD: 10.2 K/MCL (ref 4.2–11)

## 2025-02-04 PROCEDURE — 10002807 HB RX 258: Performed by: HOSPITALIST

## 2025-02-04 PROCEDURE — 83735 ASSAY OF MAGNESIUM: CPT | Performed by: HOSPITALIST

## 2025-02-04 PROCEDURE — 73610 X-RAY EXAM OF ANKLE: CPT

## 2025-02-04 PROCEDURE — 10002801 HB RX 250 W/O HCPCS

## 2025-02-04 PROCEDURE — 13000003 HB ANESTHESIA  GENERAL EA ADD MINUTE: Performed by: STUDENT IN AN ORGANIZED HEALTH CARE EDUCATION/TRAINING PROGRAM

## 2025-02-04 PROCEDURE — 10006027 HB SUPPLY 278: Performed by: STUDENT IN AN ORGANIZED HEALTH CARE EDUCATION/TRAINING PROGRAM

## 2025-02-04 PROCEDURE — 27788 TREATMENT OF ANKLE FRACTURE: CPT | Performed by: STUDENT IN AN ORGANIZED HEALTH CARE EDUCATION/TRAINING PROGRAM

## 2025-02-04 PROCEDURE — 10004451 HB PACU RECOVERY 1ST 30 MINUTES: Performed by: STUDENT IN AN ORGANIZED HEALTH CARE EDUCATION/TRAINING PROGRAM

## 2025-02-04 PROCEDURE — 36415 COLL VENOUS BLD VENIPUNCTURE: CPT | Performed by: HOSPITALIST

## 2025-02-04 PROCEDURE — 10005281 FL INTRAOPERATIVE C ARM WITH REPORT

## 2025-02-04 PROCEDURE — 10002800 HB RX 250 W HCPCS: Performed by: STUDENT IN AN ORGANIZED HEALTH CARE EDUCATION/TRAINING PROGRAM

## 2025-02-04 PROCEDURE — 99223 1ST HOSP IP/OBS HIGH 75: CPT | Performed by: INTERNAL MEDICINE

## 2025-02-04 PROCEDURE — 10002801 HB RX 250 W/O HCPCS: Performed by: STUDENT IN AN ORGANIZED HEALTH CARE EDUCATION/TRAINING PROGRAM

## 2025-02-04 PROCEDURE — C1713 ANCHOR/SCREW BN/BN,TIS/BN: HCPCS | Performed by: STUDENT IN AN ORGANIZED HEALTH CARE EDUCATION/TRAINING PROGRAM

## 2025-02-04 PROCEDURE — 10002807 HB RX 258

## 2025-02-04 PROCEDURE — 10002800 HB RX 250 W HCPCS

## 2025-02-04 PROCEDURE — 80048 BASIC METABOLIC PNL TOTAL CA: CPT | Performed by: HOSPITALIST

## 2025-02-04 PROCEDURE — C1769 GUIDE WIRE: HCPCS | Performed by: STUDENT IN AN ORGANIZED HEALTH CARE EDUCATION/TRAINING PROGRAM

## 2025-02-04 PROCEDURE — 99222 1ST HOSP IP/OBS MODERATE 55: CPT | Performed by: STUDENT IN AN ORGANIZED HEALTH CARE EDUCATION/TRAINING PROGRAM

## 2025-02-04 PROCEDURE — 10004180 HB COUNTER-TRANSPORT

## 2025-02-04 PROCEDURE — 10004651 HB RX, NO CHARGE ITEM: Performed by: STUDENT IN AN ORGANIZED HEALTH CARE EDUCATION/TRAINING PROGRAM

## 2025-02-04 PROCEDURE — 0QSK04Z REPOSITION LEFT FIBULA WITH INTERNAL FIXATION DEVICE, OPEN APPROACH: ICD-10-PCS | Performed by: STUDENT IN AN ORGANIZED HEALTH CARE EDUCATION/TRAINING PROGRAM

## 2025-02-04 PROCEDURE — 10000002 HB ROOM CHARGE MED SURG

## 2025-02-04 PROCEDURE — 93005 ELECTROCARDIOGRAM TRACING: CPT | Performed by: STUDENT IN AN ORGANIZED HEALTH CARE EDUCATION/TRAINING PROGRAM

## 2025-02-04 PROCEDURE — 13000117 HB ORTHO COMPLEX CASE EA ADD MINUTE: Performed by: STUDENT IN AN ORGANIZED HEALTH CARE EDUCATION/TRAINING PROGRAM

## 2025-02-04 PROCEDURE — 27792 TREATMENT OF ANKLE FRACTURE: CPT | Performed by: STUDENT IN AN ORGANIZED HEALTH CARE EDUCATION/TRAINING PROGRAM

## 2025-02-04 PROCEDURE — 10002803 HB RX 637: Performed by: INTERNAL MEDICINE

## 2025-02-04 PROCEDURE — 10004452 HB PACU ADDL 30 MINUTES: Performed by: STUDENT IN AN ORGANIZED HEALTH CARE EDUCATION/TRAINING PROGRAM

## 2025-02-04 PROCEDURE — 85025 COMPLETE CBC W/AUTO DIFF WBC: CPT | Performed by: HOSPITALIST

## 2025-02-04 PROCEDURE — 13000002 HB ANESTHESIA  GENERAL   S/U + 1ST 15 MIN: Performed by: STUDENT IN AN ORGANIZED HEALTH CARE EDUCATION/TRAINING PROGRAM

## 2025-02-04 PROCEDURE — 10006023 HB SUPPLY 272: Performed by: STUDENT IN AN ORGANIZED HEALTH CARE EDUCATION/TRAINING PROGRAM

## 2025-02-04 PROCEDURE — 13000116 HB ORTHO COMPLEX CASE S/U + 1ST 15 MIN: Performed by: STUDENT IN AN ORGANIZED HEALTH CARE EDUCATION/TRAINING PROGRAM

## 2025-02-04 DEVICE — IMPLANTABLE DEVICE: Type: IMPLANTABLE DEVICE | Site: ANKLE | Status: FUNCTIONAL

## 2025-02-04 DEVICE — SCREW L14 MM OD3.5 MM STNLS STL ANKLE BN ANTHEM: Type: IMPLANTABLE DEVICE | Site: ANKLE | Status: FUNCTIONAL

## 2025-02-04 RX ORDER — HYDRALAZINE HYDROCHLORIDE 20 MG/ML
5 INJECTION INTRAMUSCULAR; INTRAVENOUS EVERY 10 MIN PRN
Status: DISCONTINUED | OUTPATIENT
Start: 2025-02-04 | End: 2025-02-04

## 2025-02-04 RX ORDER — CITALOPRAM HYDROBROMIDE 40 MG/1
20 TABLET ORAL DAILY
Status: DISCONTINUED | OUTPATIENT
Start: 2025-02-05 | End: 2025-02-10 | Stop reason: HOSPADM

## 2025-02-04 RX ORDER — ONDANSETRON 2 MG/ML
4 INJECTION INTRAMUSCULAR; INTRAVENOUS EVERY 12 HOURS PRN
Status: DISCONTINUED | OUTPATIENT
Start: 2025-02-04 | End: 2025-02-10 | Stop reason: HOSPADM

## 2025-02-04 RX ORDER — NICOTINE POLACRILEX 4 MG
30 LOZENGE BUCCAL
Status: DISCONTINUED | OUTPATIENT
Start: 2025-02-04 | End: 2025-02-04

## 2025-02-04 RX ORDER — LORATADINE 10 MG/1
10 TABLET ORAL DAILY
Status: DISCONTINUED | OUTPATIENT
Start: 2025-02-05 | End: 2025-02-10 | Stop reason: HOSPADM

## 2025-02-04 RX ORDER — MIDODRINE HYDROCHLORIDE 5 MG/1
5 TABLET ORAL DAILY PRN
COMMUNITY

## 2025-02-04 RX ORDER — ONDANSETRON 4 MG/1
4 TABLET, FILM COATED ORAL EVERY 6 HOURS PRN
COMMUNITY

## 2025-02-04 RX ORDER — 0.9 % SODIUM CHLORIDE 0.9 %
10 VIAL (ML) INJECTION PRN
Status: DISCONTINUED | OUTPATIENT
Start: 2025-02-04 | End: 2025-02-10 | Stop reason: HOSPADM

## 2025-02-04 RX ORDER — 0.9 % SODIUM CHLORIDE 0.9 %
2 VIAL (ML) INJECTION EVERY 12 HOURS SCHEDULED
Status: DISCONTINUED | OUTPATIENT
Start: 2025-02-04 | End: 2025-02-04 | Stop reason: HOSPADM

## 2025-02-04 RX ORDER — ACETAMINOPHEN 500 MG
1000 TABLET ORAL EVERY 6 HOURS
Status: DISCONTINUED | OUTPATIENT
Start: 2025-02-04 | End: 2025-02-10 | Stop reason: HOSPADM

## 2025-02-04 RX ORDER — HYDROCODONE BITARTRATE AND ACETAMINOPHEN 5; 325 MG/1; MG/1
1 TABLET ORAL EVERY 4 HOURS PRN
Status: DISCONTINUED | OUTPATIENT
Start: 2025-02-04 | End: 2025-02-04

## 2025-02-04 RX ORDER — ESMOLOL HYDROCHLORIDE 10 MG/ML
INJECTION INTRAVENOUS PRN
Status: DISCONTINUED | OUTPATIENT
Start: 2025-02-04 | End: 2025-02-04

## 2025-02-04 RX ORDER — SODIUM CHLORIDE, SODIUM LACTATE, POTASSIUM CHLORIDE, CALCIUM CHLORIDE 600; 310; 30; 20 MG/100ML; MG/100ML; MG/100ML; MG/100ML
INJECTION, SOLUTION INTRAVENOUS CONTINUOUS
Status: DISCONTINUED | OUTPATIENT
Start: 2025-02-04 | End: 2025-02-04

## 2025-02-04 RX ORDER — DONEPEZIL HYDROCHLORIDE 5 MG/1
5 TABLET, FILM COATED ORAL NIGHTLY
Status: DISCONTINUED | OUTPATIENT
Start: 2025-02-04 | End: 2025-02-10 | Stop reason: HOSPADM

## 2025-02-04 RX ORDER — 0.9 % SODIUM CHLORIDE 0.9 %
2 VIAL (ML) INJECTION EVERY 12 HOURS SCHEDULED
Status: DISCONTINUED | OUTPATIENT
Start: 2025-02-04 | End: 2025-02-10 | Stop reason: HOSPADM

## 2025-02-04 RX ORDER — PHENYLEPHRINE HYDROCHLORIDE 10 MG/ML
INJECTION, SOLUTION INTRAMUSCULAR; INTRAVENOUS; SUBCUTANEOUS PRN
Status: DISCONTINUED | OUTPATIENT
Start: 2025-02-04 | End: 2025-02-04

## 2025-02-04 RX ORDER — SODIUM CHLORIDE, SODIUM LACTATE, POTASSIUM CHLORIDE, CALCIUM CHLORIDE 600; 310; 30; 20 MG/100ML; MG/100ML; MG/100ML; MG/100ML
INJECTION, SOLUTION INTRAVENOUS CONTINUOUS PRN
Status: DISCONTINUED | OUTPATIENT
Start: 2025-02-04 | End: 2025-02-04

## 2025-02-04 RX ORDER — ENOXAPARIN SODIUM 100 MG/ML
40 INJECTION SUBCUTANEOUS DAILY
Status: DISCONTINUED | OUTPATIENT
Start: 2025-02-05 | End: 2025-02-10 | Stop reason: HOSPADM

## 2025-02-04 RX ORDER — BUPIVACAINE HYDROCHLORIDE AND EPINEPHRINE 2.5; 5 MG/ML; UG/ML
INJECTION, SOLUTION EPIDURAL; INFILTRATION; INTRACAUDAL; PERINEURAL PRN
Status: DISCONTINUED | OUTPATIENT
Start: 2025-02-04 | End: 2025-02-04 | Stop reason: HOSPADM

## 2025-02-04 RX ORDER — ACETAMINOPHEN 650 MG/1
650 SUPPOSITORY RECTAL EVERY 4 HOURS PRN
Status: DISCONTINUED | OUTPATIENT
Start: 2025-02-04 | End: 2025-02-04

## 2025-02-04 RX ORDER — LATANOPROST 50 UG/ML
1 SOLUTION/ DROPS OPHTHALMIC NIGHTLY
Status: DISCONTINUED | OUTPATIENT
Start: 2025-02-04 | End: 2025-02-10 | Stop reason: HOSPADM

## 2025-02-04 RX ORDER — OXYCODONE HYDROCHLORIDE 5 MG/1
5 TABLET ORAL EVERY 4 HOURS PRN
Status: DISCONTINUED | OUTPATIENT
Start: 2025-02-04 | End: 2025-02-10 | Stop reason: HOSPADM

## 2025-02-04 RX ORDER — DEXTROSE MONOHYDRATE 25 G/50ML
25 INJECTION, SOLUTION INTRAVENOUS PRN
Status: DISCONTINUED | OUTPATIENT
Start: 2025-02-04 | End: 2025-02-04

## 2025-02-04 RX ORDER — OXYCODONE HYDROCHLORIDE 5 MG/1
10 TABLET ORAL EVERY 4 HOURS PRN
Status: DISCONTINUED | OUTPATIENT
Start: 2025-02-04 | End: 2025-02-10 | Stop reason: HOSPADM

## 2025-02-04 RX ORDER — METOCLOPRAMIDE HYDROCHLORIDE 5 MG/ML
5 INJECTION INTRAMUSCULAR; INTRAVENOUS EVERY 6 HOURS PRN
Status: DISCONTINUED | OUTPATIENT
Start: 2025-02-04 | End: 2025-02-04

## 2025-02-04 RX ORDER — ONDANSETRON 4 MG/1
4 TABLET, ORALLY DISINTEGRATING ORAL EVERY 12 HOURS PRN
Status: DISCONTINUED | OUTPATIENT
Start: 2025-02-04 | End: 2025-02-10 | Stop reason: HOSPADM

## 2025-02-04 RX ORDER — SODIUM CHLORIDE 9 MG/ML
INJECTION, SOLUTION INTRAVENOUS CONTINUOUS
Status: DISCONTINUED | OUTPATIENT
Start: 2025-02-04 | End: 2025-02-05

## 2025-02-04 RX ORDER — DORZOLAMIDE HYDROCHLORIDE AND TIMOLOL MALEATE 20; 5 MG/ML; MG/ML
1 SOLUTION/ DROPS OPHTHALMIC 2 TIMES DAILY
Status: DISCONTINUED | OUTPATIENT
Start: 2025-02-04 | End: 2025-02-10 | Stop reason: HOSPADM

## 2025-02-04 RX ORDER — ATORVASTATIN CALCIUM 10 MG/1
10 TABLET, FILM COATED ORAL NIGHTLY
Status: DISCONTINUED | OUTPATIENT
Start: 2025-02-05 | End: 2025-02-10 | Stop reason: HOSPADM

## 2025-02-04 RX ORDER — ONDANSETRON 2 MG/ML
4 INJECTION INTRAMUSCULAR; INTRAVENOUS 2 TIMES DAILY PRN
Status: DISCONTINUED | OUTPATIENT
Start: 2025-02-04 | End: 2025-02-04

## 2025-02-04 RX ORDER — ASPIRIN 325 MG
325 TABLET ORAL DAILY
COMMUNITY

## 2025-02-04 RX ORDER — POLYETHYLENE GLYCOL 3350 17 G/17G
17 POWDER, FOR SOLUTION ORAL DAILY PRN
Status: DISCONTINUED | OUTPATIENT
Start: 2025-02-04 | End: 2025-02-10 | Stop reason: HOSPADM

## 2025-02-04 RX ORDER — EPHEDRINE SULFATE/0.9% NACL/PF 50 MG/10ML
SYRINGE (ML) INTRAVENOUS PRN
Status: DISCONTINUED | OUTPATIENT
Start: 2025-02-04 | End: 2025-02-04

## 2025-02-04 RX ORDER — ACETAMINOPHEN 325 MG/1
650 TABLET ORAL EVERY 4 HOURS PRN
Status: DISCONTINUED | OUTPATIENT
Start: 2025-02-04 | End: 2025-02-04

## 2025-02-04 RX ORDER — LIDOCAINE HYDROCHLORIDE 20 MG/ML
INJECTION, SOLUTION INFILTRATION; PERINEURAL PRN
Status: DISCONTINUED | OUTPATIENT
Start: 2025-02-04 | End: 2025-02-04

## 2025-02-04 RX ORDER — PROPOFOL 10 MG/ML
INJECTION, EMULSION INTRAVENOUS PRN
Status: DISCONTINUED | OUTPATIENT
Start: 2025-02-04 | End: 2025-02-04

## 2025-02-04 RX ORDER — HEPARIN SODIUM 5000 [USP'U]/ML
5000 INJECTION, SOLUTION INTRAVENOUS; SUBCUTANEOUS EVERY 8 HOURS SCHEDULED
Status: DISCONTINUED | OUTPATIENT
Start: 2025-02-04 | End: 2025-02-04

## 2025-02-04 RX ORDER — 0.9 % SODIUM CHLORIDE 0.9 %
10 VIAL (ML) INJECTION PRN
Status: DISCONTINUED | OUTPATIENT
Start: 2025-02-04 | End: 2025-02-04 | Stop reason: HOSPADM

## 2025-02-04 RX ORDER — NALOXONE HCL 0.4 MG/ML
0.2 VIAL (ML) INJECTION EVERY 5 MIN PRN
Status: DISCONTINUED | OUTPATIENT
Start: 2025-02-04 | End: 2025-02-04

## 2025-02-04 RX ADMIN — WATER 2000 MG: 1 INJECTION INTRAMUSCULAR; INTRAVENOUS; SUBCUTANEOUS at 14:54

## 2025-02-04 RX ADMIN — EPHEDRINE SULFATE 10 MG: 5 INJECTION INTRAVENOUS at 15:19

## 2025-02-04 RX ADMIN — LATANOPROST 1 DROP: 50 SOLUTION/ DROPS OPHTHALMIC at 22:59

## 2025-02-04 RX ADMIN — ESMOLOL HYDROCHLORIDE 20 MG: 100 INJECTION, SOLUTION INTRAVENOUS at 15:37

## 2025-02-04 RX ADMIN — LABETALOL HYDROCHLORIDE 5 MG: 5 INJECTION INTRAVENOUS at 16:39

## 2025-02-04 RX ADMIN — WATER 2000 MG: 1 INJECTION INTRAMUSCULAR; INTRAVENOUS; SUBCUTANEOUS at 22:44

## 2025-02-04 RX ADMIN — PROPOFOL 50 MG: 10 INJECTION, EMULSION INTRAVENOUS at 15:10

## 2025-02-04 RX ADMIN — PHENYLEPHRINE HYDROCHLORIDE 40 MCG: 10 INJECTION INTRAVENOUS at 15:18

## 2025-02-04 RX ADMIN — FENTANYL CITRATE 50 MCG: 50 INJECTION INTRAMUSCULAR; INTRAVENOUS at 00:32

## 2025-02-04 RX ADMIN — SODIUM CHLORIDE, POTASSIUM CHLORIDE, SODIUM LACTATE AND CALCIUM CHLORIDE: 600; 310; 30; 20 INJECTION, SOLUTION INTRAVENOUS at 14:39

## 2025-02-04 RX ADMIN — HYDROMORPHONE HYDROCHLORIDE 0.4 MG: 1 INJECTION, SOLUTION INTRAMUSCULAR; INTRAVENOUS; SUBCUTANEOUS at 16:40

## 2025-02-04 RX ADMIN — HYDROMORPHONE HYDROCHLORIDE 0.4 MG: 1 INJECTION, SOLUTION INTRAMUSCULAR; INTRAVENOUS; SUBCUTANEOUS at 16:30

## 2025-02-04 RX ADMIN — PROPOFOL 30 MG: 10 INJECTION, EMULSION INTRAVENOUS at 00:23

## 2025-02-04 RX ADMIN — LIDOCAINE HYDROCHLORIDE 5 ML: 20 INJECTION, SOLUTION INFILTRATION; PERINEURAL at 14:48

## 2025-02-04 RX ADMIN — EPHEDRINE SULFATE 5 MG: 5 INJECTION INTRAVENOUS at 15:16

## 2025-02-04 RX ADMIN — DORZOLAMIDE HYDROCHLORIDE AND TIMOLOL MALEATE 1 DROP: 22.3; 6.8 SOLUTION/ DROPS OPHTHALMIC at 22:51

## 2025-02-04 RX ADMIN — FENTANYL CITRATE 50 MCG: 50 INJECTION INTRAMUSCULAR; INTRAVENOUS at 15:10

## 2025-02-04 RX ADMIN — FENTANYL CITRATE 50 MCG: 50 INJECTION INTRAMUSCULAR; INTRAVENOUS at 14:47

## 2025-02-04 RX ADMIN — PHENYLEPHRINE HYDROCHLORIDE 80 MCG: 10 INJECTION INTRAVENOUS at 15:22

## 2025-02-04 RX ADMIN — ACETAMINOPHEN 1000 MG: 500 TABLET ORAL at 22:43

## 2025-02-04 RX ADMIN — EPHEDRINE SULFATE 10 MG: 5 INJECTION INTRAVENOUS at 14:59

## 2025-02-04 RX ADMIN — DONEPEZIL HYDROCHLORIDE 5 MG: 5 TABLET ORAL at 22:42

## 2025-02-04 RX ADMIN — PHENYLEPHRINE HYDROCHLORIDE 80 MCG: 10 INJECTION INTRAVENOUS at 15:25

## 2025-02-04 RX ADMIN — PROPOFOL 100 MG: 10 INJECTION, EMULSION INTRAVENOUS at 14:49

## 2025-02-04 RX ADMIN — EPHEDRINE SULFATE 5 MG: 5 INJECTION INTRAVENOUS at 15:24

## 2025-02-04 RX ADMIN — AMITRIPTYLINE HYDROCHLORIDE 50 MG: 25 TABLET, FILM COATED ORAL at 22:43

## 2025-02-04 RX ADMIN — SODIUM CHLORIDE: 9 INJECTION, SOLUTION INTRAVENOUS at 03:08

## 2025-02-04 RX ADMIN — PHENYLEPHRINE HYDROCHLORIDE 40 MCG: 10 INJECTION INTRAVENOUS at 15:29

## 2025-02-04 RX ADMIN — Medication 40 MG: at 00:21

## 2025-02-04 RX ADMIN — HYDROMORPHONE HYDROCHLORIDE 0.2 MG: 1 INJECTION, SOLUTION INTRAMUSCULAR; INTRAVENOUS; SUBCUTANEOUS at 16:50

## 2025-02-04 SDOH — ECONOMIC STABILITY: GENERAL
IN THE PAST YEAR, HAVE YOU OR ANY FAMILY MEMBERS YOU LIVE WITH BEEN UNABLE TO GET ANY OF THE FOLLOWING WHEN IT WAS REALLY NEEDED? CHECK ALL THAT APPLY.: PATIENT DECLINED

## 2025-02-04 SDOH — SOCIAL STABILITY: SOCIAL NETWORK
HOW OFTEN DO YOU SEE OR TALK TO PEOPLE THAT YOU CARE ABOUT AND FEEL CLOSE TO? (FOR EXAMPLE: TALKING TO FRIENDS ON THE PHONE, VISITING FRIENDS OR FAMILY, GOING TO CHURCH OR CLUB MEETINGS): LESS THAN ONCE A WEEK

## 2025-02-04 SDOH — HEALTH STABILITY: GENERAL: BECAUSE OF A PHYSICAL, MENTAL, OR EMOTIONAL CONDITION, DO YOU HAVE DIFFICULTY DOING ERRANDS ALONE?: NO

## 2025-02-04 SDOH — HEALTH STABILITY: PHYSICAL HEALTH: DO YOU HAVE DIFFICULTY DRESSING OR BATHING?: YES

## 2025-02-04 SDOH — ECONOMIC STABILITY: HOUSING INSECURITY: WHAT IS YOUR LIVING SITUATION TODAY?: I HAVE A STEADY PLACE TO LIVE

## 2025-02-04 SDOH — ECONOMIC STABILITY: FOOD INSECURITY: WITHIN THE PAST 12 MONTHS, THE FOOD YOU BOUGHT JUST DIDN'T LAST AND YOU DIDN'T HAVE MONEY TO GET MORE.: PATIENT DECLINED

## 2025-02-04 SDOH — ECONOMIC STABILITY: GENERAL: WOULD YOU LIKE HELP WITH ANY OF THE FOLLOWING NEEDS?: I DON'T WANT HELP WITH ANY OF THESE

## 2025-02-04 SDOH — ECONOMIC STABILITY: HOUSING INSECURITY: DO YOU HAVE PROBLEMS WITH ANY OF THE FOLLOWING?: PATIENT DECLINED

## 2025-02-04 SDOH — HEALTH STABILITY: GENERAL
BECAUSE OF A PHYSICAL, MENTAL, OR EMOTIONAL CONDITION, DO YOU HAVE SERIOUS DIFFICULTY CONCENTRATING, REMEMBERING OR MAKING DECISIONS?: NO

## 2025-02-04 SDOH — ECONOMIC STABILITY: INCOME INSECURITY
IN THE PAST 12 MONTHS, HAS THE ELECTRIC, GAS, OIL, OR WATER COMPANY THREATENED TO SHUT OFF SERVICE IN YOUR HOME?: PATIENT DECLINED

## 2025-02-04 SDOH — HEALTH STABILITY: PHYSICAL HEALTH: DO YOU HAVE SERIOUS DIFFICULTY WALKING OR CLIMBING STAIRS?: YES

## 2025-02-04 SDOH — ECONOMIC STABILITY: HOUSING INSECURITY: WHAT IS YOUR LIVING SITUATION TODAY?: ASSISTED LIVING

## 2025-02-04 SDOH — ECONOMIC STABILITY: TRANSPORTATION INSECURITY
IN THE PAST 12 MONTHS, HAS LACK OF RELIABLE TRANSPORTATION KEPT YOU FROM MEDICAL APPOINTMENTS, MEETINGS, WORK OR FROM GETTING THINGS NEEDED FOR DAILY LIVING?: PATIENT DECLINED

## 2025-02-04 ASSESSMENT — PAIN SCALES - PAIN ASSESSMENT IN ADVANCED DEMENTIA (PAINAD)
TOTALSCORE: 0
CONSOLABILITY: NO NEED TO CONSOLE
BREATHING: NORMAL
BREATHING: NORMAL
NEGVOCALIZATION: OCCASIONAL MOAN OR GROAN, LOW LEVELS OF SPEECH WITH A NEGATIVE OR DISAPPROVING QUALITY
BREATHING: NORMAL
CONSOLABILITY: NO NEED TO CONSOLE
CONSOLABILITY: NO NEED TO CONSOLE
TOTALSCORE: 0
BODYLANGUAGE: RELAXED
FACIALEXPRESSION: SMILING OR INEXPRESSIVE
BREATHING: NORMAL
BODYLANGUAGE: RELAXED
FACIALEXPRESSION: SMILING OR INEXPRESSIVE
CONSOLABILITY: NO NEED TO CONSOLE
BODYLANGUAGE: RELAXED
TOTALSCORE: 0
FACIALEXPRESSION: SMILING OR INEXPRESSIVE
FACIALEXPRESSION: SMILING OR INEXPRESSIVE
TOTALSCORE: 1
BODYLANGUAGE: RELAXED

## 2025-02-04 ASSESSMENT — LIFESTYLE VARIABLES
AUDIT-C TOTAL SCORE: 0
HOW OFTEN DO YOU HAVE 6 OR MORE DRINKS ON ONE OCCASION: NEVER
HOW OFTEN DO YOU HAVE A DRINK CONTAINING ALCOHOL: NEVER
HOW MANY STANDARD DRINKS CONTAINING ALCOHOL DO YOU HAVE ON A TYPICAL DAY: 0,1 OR 2
ALCOHOL_USE_STATUS: NO OR LOW RISK WITH VALIDATED TOOL

## 2025-02-04 ASSESSMENT — PAIN SCALES - GENERAL
PAINLEVEL_OUTOF10: 0
PAINLEVEL_OUTOF10: 0

## 2025-02-04 ASSESSMENT — ACTIVITIES OF DAILY LIVING (ADL)
ADL_SCORE: 6
RECENT_DECLINE_ADL: YES, ACUTE ILLNESS WITHOUT THERAPY NEEDS
FEEDING: NEEDS ASSISTANCE
BATHING: NEEDS ASSISTANCE
ADL_BEFORE_ADMISSION: NEEDS/REQUIRES ASSISTANCE
TOILETING: NEEDS ASSISTANCE
DRESSING: NEEDS ASSISTANCE

## 2025-02-04 ASSESSMENT — ORIENTATION MEMORY CONCENTRATION TEST (OMCT)
WHAT TIME IS IT (NO WATCH OR CLOCK): INCORRECT
SAY THE MONTHS IN REVERSE ORDER STARTING WITH LAST MONTH: 2 OR MORE ERRORS
WHAT YEAR IS IT NOW (MUST BE EXACT): INCORRECT
REPEAT THE NAME AND ADDRESS I ASKED YOU TO REMEMBER: 4 ERRORS
WHAT MONTH IS IT NOW: INCORRECT
COUNT BACKWARDS FROM 20 TO 1: 2 OR MORE ERRORS
OMCT INTERPRETATION: 11 OR GREATER: MODERATE TO SEVERE COGNITIVE IMPAIRMENT
OMCT SCORE: 26

## 2025-02-04 ASSESSMENT — COLUMBIA-SUICIDE SEVERITY RATING SCALE - C-SSRS
1. WITHIN THE PAST MONTH, HAVE YOU WISHED YOU WERE DEAD OR WISHED YOU COULD GO TO SLEEP AND NOT WAKE UP?: NO
2. HAVE YOU ACTUALLY HAD ANY THOUGHTS OF KILLING YOURSELF?: NO
6. HAVE YOU EVER DONE ANYTHING, STARTED TO DO ANYTHING, OR PREPARED TO DO ANYTHING TO END YOUR LIFE?: NO

## 2025-02-04 ASSESSMENT — PAIN SCALES - BEHAVIORAL PAIN SCALE (BPS): BPS_SCORE: 3

## 2025-02-04 ASSESSMENT — PATIENT HEALTH QUESTIONNAIRE - PHQ9
CLINICAL INTERPRETATION OF PHQ2 SCORE: NO FURTHER SCREENING NEEDED
IS PATIENT ABLE TO COMPLETE PHQ2 OR PHQ9: YES
IS PATIENT ABLE TO COMPLETE PHQ2 OR PHQ9: NO, DEFER TO LATER TIME
1. LITTLE INTEREST OR PLEASURE IN DOING THINGS: SEVERAL DAYS
IS PATIENT ABLE TO COMPLETE PHQ2 OR PHQ9: NO, DEFER TO LATER TIME
IS PATIENT ABLE TO COMPLETE PHQ2 OR PHQ9: YES
SUM OF ALL RESPONSES TO PHQ9 QUESTIONS 1 AND 2: 2
SUM OF ALL RESPONSES TO PHQ9 QUESTIONS 1 AND 2: 2
2. FEELING DOWN, DEPRESSED OR HOPELESS: SEVERAL DAYS

## 2025-02-05 LAB
APPEARANCE UR: CLEAR
BACTERIA #/AREA URNS HPF: ABNORMAL /HPF
BILIRUB UR QL STRIP: NEGATIVE
COLOR UR: ABNORMAL
GLUCOSE UR STRIP-MCNC: NEGATIVE MG/DL
HGB UR QL STRIP: ABNORMAL
HYALINE CASTS #/AREA URNS LPF: ABNORMAL /LPF
KETONES UR STRIP-MCNC: NEGATIVE MG/DL
LEUKOCYTE ESTERASE UR QL STRIP: ABNORMAL
NITRITE UR QL STRIP: NEGATIVE
PH UR STRIP: 6 [PH] (ref 5–7)
PROT UR STRIP-MCNC: NEGATIVE MG/DL
RBC #/AREA URNS HPF: ABNORMAL /HPF
SP GR UR STRIP: 1.02 (ref 1–1.03)
SQUAMOUS #/AREA URNS HPF: ABNORMAL /HPF
UROBILINOGEN UR STRIP-MCNC: 0.2 MG/DL
WBC #/AREA URNS HPF: ABNORMAL /HPF

## 2025-02-05 PROCEDURE — 81001 URINALYSIS AUTO W/SCOPE: CPT | Performed by: INTERNAL MEDICINE

## 2025-02-05 PROCEDURE — 10004651 HB RX, NO CHARGE ITEM: Performed by: STUDENT IN AN ORGANIZED HEALTH CARE EDUCATION/TRAINING PROGRAM

## 2025-02-05 PROCEDURE — 13001086 HB INCENTIVE SPIROMETER W INSTRUCT

## 2025-02-05 PROCEDURE — 10002803 HB RX 637: Performed by: STUDENT IN AN ORGANIZED HEALTH CARE EDUCATION/TRAINING PROGRAM

## 2025-02-05 PROCEDURE — 10002801 HB RX 250 W/O HCPCS: Performed by: STUDENT IN AN ORGANIZED HEALTH CARE EDUCATION/TRAINING PROGRAM

## 2025-02-05 PROCEDURE — 87086 URINE CULTURE/COLONY COUNT: CPT | Performed by: INTERNAL MEDICINE

## 2025-02-05 PROCEDURE — 99233 SBSQ HOSP IP/OBS HIGH 50: CPT | Performed by: INTERNAL MEDICINE

## 2025-02-05 PROCEDURE — 99024 POSTOP FOLLOW-UP VISIT: CPT | Performed by: PHYSICIAN ASSISTANT

## 2025-02-05 PROCEDURE — 10002800 HB RX 250 W HCPCS: Performed by: STUDENT IN AN ORGANIZED HEALTH CARE EDUCATION/TRAINING PROGRAM

## 2025-02-05 PROCEDURE — 10002803 HB RX 637: Performed by: INTERNAL MEDICINE

## 2025-02-05 PROCEDURE — 10002016 HB COUNTER INCENTIVE SPIROMETRY

## 2025-02-05 PROCEDURE — 97166 OT EVAL MOD COMPLEX 45 MIN: CPT

## 2025-02-05 PROCEDURE — 97530 THERAPEUTIC ACTIVITIES: CPT

## 2025-02-05 PROCEDURE — 10000002 HB ROOM CHARGE MED SURG

## 2025-02-05 PROCEDURE — 96372 THER/PROPH/DIAG INJ SC/IM: CPT | Performed by: STUDENT IN AN ORGANIZED HEALTH CARE EDUCATION/TRAINING PROGRAM

## 2025-02-05 PROCEDURE — 97162 PT EVAL MOD COMPLEX 30 MIN: CPT

## 2025-02-05 PROCEDURE — 10004180 HB COUNTER-TRANSPORT

## 2025-02-05 RX ADMIN — ACETAMINOPHEN 1000 MG: 500 TABLET ORAL at 10:34

## 2025-02-05 RX ADMIN — DONEPEZIL HYDROCHLORIDE 5 MG: 5 TABLET ORAL at 20:36

## 2025-02-05 RX ADMIN — ATORVASTATIN CALCIUM 10 MG: 10 TABLET, FILM COATED ORAL at 20:36

## 2025-02-05 RX ADMIN — ENOXAPARIN SODIUM 40 MG: 100 INJECTION SUBCUTANEOUS at 05:33

## 2025-02-05 RX ADMIN — SODIUM CHLORIDE, PRESERVATIVE FREE 2 ML: 5 INJECTION INTRAVENOUS at 20:44

## 2025-02-05 RX ADMIN — OXYCODONE 5 MG: 5 TABLET ORAL at 12:08

## 2025-02-05 RX ADMIN — AMITRIPTYLINE HYDROCHLORIDE 50 MG: 25 TABLET, FILM COATED ORAL at 20:36

## 2025-02-05 RX ADMIN — LATANOPROST 1 DROP: 50 SOLUTION/ DROPS OPHTHALMIC at 21:32

## 2025-02-05 RX ADMIN — SODIUM CHLORIDE, PRESERVATIVE FREE 10 ML: 5 INJECTION INTRAVENOUS at 08:25

## 2025-02-05 RX ADMIN — LORATADINE 10 MG: 10 TABLET ORAL at 08:23

## 2025-02-05 RX ADMIN — DORZOLAMIDE HYDROCHLORIDE AND TIMOLOL MALEATE 1 DROP: 22.3; 6.8 SOLUTION/ DROPS OPHTHALMIC at 08:25

## 2025-02-05 RX ADMIN — WATER 2000 MG: 1 INJECTION INTRAMUSCULAR; INTRAVENOUS; SUBCUTANEOUS at 05:29

## 2025-02-05 RX ADMIN — ACETAMINOPHEN 1000 MG: 500 TABLET ORAL at 17:31

## 2025-02-05 RX ADMIN — DORZOLAMIDE HYDROCHLORIDE AND TIMOLOL MALEATE 1 DROP: 22.3; 6.8 SOLUTION/ DROPS OPHTHALMIC at 21:32

## 2025-02-05 RX ADMIN — ACETAMINOPHEN 1000 MG: 500 TABLET ORAL at 05:33

## 2025-02-05 SDOH — ECONOMIC STABILITY: HOUSING INSECURITY: WHAT IS YOUR LIVING SITUATION TODAY?: I HAVE A STEADY PLACE TO LIVE

## 2025-02-05 SDOH — ECONOMIC STABILITY: HOUSING INSECURITY: DO YOU HAVE PROBLEMS WITH ANY OF THE FOLLOWING?: PATIENT DECLINED

## 2025-02-05 ASSESSMENT — COGNITIVE AND FUNCTIONAL STATUS - GENERAL
HELP NEEDED FOR PERSONAL GROOMING: A LITTLE
BECAUSE OF A PHYSICAL, MENTAL, OR EMOTIONAL CONDITION, DO YOU HAVE SERIOUS DIFFICULTY CONCENTRATING, REMEMBERING OR MAKING DECISIONS: NO
HELP NEEDED DRESSING REGULAR LOWER BODY CLOTHING: TOTAL
HELP NEEDED FOR BATHING: A LOT
HELP NEEDED DRESSING REGULAR UPPER BODY CLOTHING: A LITTLE
BECAUSE OF A PHYSICAL, MENTAL, OR EMOTIONAL CONDITION, DO YOU HAVE DIFFICULTY DOING ERRANDS ALONE: NO
DO YOU HAVE SERIOUS DIFFICULTY WALKING OR CLIMBING STAIRS: YES
BASIC_MOBILITY_RAW_SCORE: 8
DAILY_ACTIVITY_RAW_SCORE: 14
BASIC_MOBILITY_CONVERTED_SCORE: 22.61
DAILY_ACTIVITY_CONVERTED_SCORE: 33.39
DO YOU HAVE DIFFICULTY DRESSING OR BATHING: YES
HELP NEEDED FOR TOILETING: TOTAL

## 2025-02-05 ASSESSMENT — PAIN SCALES - PAIN ASSESSMENT IN ADVANCED DEMENTIA (PAINAD)
BODYLANGUAGE: RELAXED
BREATHING: NORMAL
CONSOLABILITY: NO NEED TO CONSOLE
TOTALSCORE: 0
BREATHING: NORMAL
BODYLANGUAGE: RELAXED
FACIALEXPRESSION: SMILING OR INEXPRESSIVE
FACIALEXPRESSION: SMILING OR INEXPRESSIVE
BODYLANGUAGE: RELAXED
TOTALSCORE: 0
BODYLANGUAGE: RELAXED
FACIALEXPRESSION: SMILING OR INEXPRESSIVE
TOTALSCORE: 0
CONSOLABILITY: NO NEED TO CONSOLE
NEGVOCALIZATION: OCCASIONAL MOAN OR GROAN, LOW LEVELS OF SPEECH WITH A NEGATIVE OR DISAPPROVING QUALITY
CONSOLABILITY: NO NEED TO CONSOLE
TOTALSCORE: 1
BODYLANGUAGE: RELAXED
BREATHING: NORMAL
FACIALEXPRESSION: SMILING OR INEXPRESSIVE
CONSOLABILITY: NO NEED TO CONSOLE
BODYLANGUAGE: RELAXED
BREATHING: NORMAL
FACIALEXPRESSION: SMILING OR INEXPRESSIVE
BREATHING: NORMAL
CONSOLABILITY: NO NEED TO CONSOLE
BREATHING: NORMAL
CONSOLABILITY: NO NEED TO CONSOLE
TOTALSCORE: 0
TOTALSCORE: 0
FACIALEXPRESSION: SMILING OR INEXPRESSIVE

## 2025-02-05 ASSESSMENT — PAIN SCALES - GENERAL
PAINLEVEL_OUTOF10: 6
PAINLEVEL_OUTOF10: 0
PAINLEVEL_OUTOF10: 0

## 2025-02-05 ASSESSMENT — ACTIVITIES OF DAILY LIVING (ADL)
PRIOR_ADL: MODERATE ASSIST (MOD)
PRIOR_ADL_TOILETING: MAXIMAL ASSIST (MAX)
GROOMING: INDEPENDENT
PRIOR_ADL_BATHING: MAXIMAL ASSIST (MAX)
EATING: INDEPENDENT

## 2025-02-05 ASSESSMENT — ENCOUNTER SYMPTOMS: PAIN SEVERITY NOW: 8

## 2025-02-06 LAB
ANION GAP SERPL CALC-SCNC: 9 MMOL/L (ref 7–19)
BACTERIA UR CULT: NO GROWTH
BASOPHILS # BLD: 0 K/MCL (ref 0–0.3)
BASOPHILS NFR BLD: 0 %
BUN SERPL-MCNC: 15 MG/DL (ref 6–20)
BUN/CREAT SERPL: 22 (ref 7–25)
CALCIUM SERPL-MCNC: 8.6 MG/DL (ref 8.4–10.2)
CHLORIDE SERPL-SCNC: 106 MMOL/L (ref 97–110)
CO2 SERPL-SCNC: 26 MMOL/L (ref 21–32)
CREAT SERPL-MCNC: 0.69 MG/DL (ref 0.51–0.95)
DEPRECATED RDW RBC: 49.6 FL (ref 39–50)
EGFRCR SERPLBLD CKD-EPI 2021: 82 ML/MIN/{1.73_M2}
EOSINOPHIL # BLD: 0.6 K/MCL (ref 0–0.5)
EOSINOPHIL NFR BLD: 5 %
ERYTHROCYTE [DISTWIDTH] IN BLOOD: 13.8 % (ref 11–15)
FASTING DURATION TIME PATIENT: ABNORMAL H
GLUCOSE SERPL-MCNC: 108 MG/DL (ref 70–99)
HCT VFR BLD CALC: 33 % (ref 36–46.5)
HGB BLD-MCNC: 10.4 G/DL (ref 12–15.5)
IMM GRANULOCYTES # BLD AUTO: 0.1 K/MCL (ref 0–0.2)
IMM GRANULOCYTES # BLD: 1 %
LYMPHOCYTES # BLD: 1.8 K/MCL (ref 1–4)
LYMPHOCYTES NFR BLD: 14 %
MCH RBC QN AUTO: 30.6 PG (ref 26–34)
MCHC RBC AUTO-ENTMCNC: 31.5 G/DL (ref 32–36.5)
MCV RBC AUTO: 97.1 FL (ref 78–100)
MONOCYTES # BLD: 1.1 K/MCL (ref 0.3–0.9)
MONOCYTES NFR BLD: 8 %
NEUTROPHILS # BLD: 9.7 K/MCL (ref 1.8–7.7)
NEUTROPHILS NFR BLD: 72 %
NRBC BLD MANUAL-RTO: 0 /100 WBC
PLATELET # BLD AUTO: 310 K/MCL (ref 140–450)
POTASSIUM SERPL-SCNC: 4.5 MMOL/L (ref 3.4–5.1)
RBC # BLD: 3.4 MIL/MCL (ref 4–5.2)
SODIUM SERPL-SCNC: 136 MMOL/L (ref 135–145)
WBC # BLD: 13.4 K/MCL (ref 4.2–11)

## 2025-02-06 PROCEDURE — 80048 BASIC METABOLIC PNL TOTAL CA: CPT | Performed by: INTERNAL MEDICINE

## 2025-02-06 PROCEDURE — 10002803 HB RX 637: Performed by: INTERNAL MEDICINE

## 2025-02-06 PROCEDURE — 85025 COMPLETE CBC W/AUTO DIFF WBC: CPT | Performed by: INTERNAL MEDICINE

## 2025-02-06 PROCEDURE — 10002800 HB RX 250 W HCPCS: Performed by: STUDENT IN AN ORGANIZED HEALTH CARE EDUCATION/TRAINING PROGRAM

## 2025-02-06 PROCEDURE — 36415 COLL VENOUS BLD VENIPUNCTURE: CPT | Performed by: INTERNAL MEDICINE

## 2025-02-06 PROCEDURE — 10000002 HB ROOM CHARGE MED SURG

## 2025-02-06 PROCEDURE — 96372 THER/PROPH/DIAG INJ SC/IM: CPT | Performed by: STUDENT IN AN ORGANIZED HEALTH CARE EDUCATION/TRAINING PROGRAM

## 2025-02-06 PROCEDURE — 10004651 HB RX, NO CHARGE ITEM: Performed by: STUDENT IN AN ORGANIZED HEALTH CARE EDUCATION/TRAINING PROGRAM

## 2025-02-06 PROCEDURE — 99024 POSTOP FOLLOW-UP VISIT: CPT | Performed by: PHYSICIAN ASSISTANT

## 2025-02-06 PROCEDURE — 99233 SBSQ HOSP IP/OBS HIGH 50: CPT | Performed by: INTERNAL MEDICINE

## 2025-02-06 RX ORDER — CEFDINIR 300 MG/1
300 CAPSULE ORAL EVERY 12 HOURS SCHEDULED
Status: DISCONTINUED | OUTPATIENT
Start: 2025-02-06 | End: 2025-02-10 | Stop reason: HOSPADM

## 2025-02-06 RX ORDER — HYDROCODONE BITARTRATE AND ACETAMINOPHEN 5; 325 MG/1; MG/1
1 TABLET ORAL EVERY 6 HOURS PRN
Qty: 20 TABLET | Refills: 0 | Status: SHIPPED | OUTPATIENT
Start: 2025-02-06

## 2025-02-06 RX ORDER — ENOXAPARIN SODIUM 100 MG/ML
40 INJECTION SUBCUTANEOUS DAILY
Qty: 25 EACH | Refills: 0 | Status: SHIPPED | OUTPATIENT
Start: 2025-02-07

## 2025-02-06 RX ADMIN — DONEPEZIL HYDROCHLORIDE 5 MG: 5 TABLET ORAL at 21:47

## 2025-02-06 RX ADMIN — DORZOLAMIDE HYDROCHLORIDE AND TIMOLOL MALEATE 1 DROP: 22.3; 6.8 SOLUTION/ DROPS OPHTHALMIC at 09:23

## 2025-02-06 RX ADMIN — AMITRIPTYLINE HYDROCHLORIDE 50 MG: 25 TABLET, FILM COATED ORAL at 21:47

## 2025-02-06 RX ADMIN — DORZOLAMIDE HYDROCHLORIDE AND TIMOLOL MALEATE 1 DROP: 22.3; 6.8 SOLUTION/ DROPS OPHTHALMIC at 21:51

## 2025-02-06 RX ADMIN — LATANOPROST 1 DROP: 50 SOLUTION/ DROPS OPHTHALMIC at 21:52

## 2025-02-06 RX ADMIN — ENOXAPARIN SODIUM 40 MG: 100 INJECTION SUBCUTANEOUS at 09:21

## 2025-02-06 RX ADMIN — SODIUM CHLORIDE, PRESERVATIVE FREE 2 ML: 5 INJECTION INTRAVENOUS at 21:50

## 2025-02-06 RX ADMIN — SODIUM CHLORIDE, PRESERVATIVE FREE 2 ML: 5 INJECTION INTRAVENOUS at 09:23

## 2025-02-06 RX ADMIN — CITALOPRAM 20 MG: 40 TABLET, FILM COATED ORAL at 10:45

## 2025-02-06 RX ADMIN — LORATADINE 10 MG: 10 TABLET ORAL at 09:21

## 2025-02-06 RX ADMIN — CEFDINIR 300 MG: 300 CAPSULE ORAL at 17:00

## 2025-02-06 RX ADMIN — ACETAMINOPHEN 1000 MG: 500 TABLET ORAL at 21:47

## 2025-02-06 RX ADMIN — ACETAMINOPHEN 1000 MG: 500 TABLET ORAL at 05:01

## 2025-02-06 RX ADMIN — SODIUM CHLORIDE, PRESERVATIVE FREE 2 ML: 5 INJECTION INTRAVENOUS at 09:24

## 2025-02-06 RX ADMIN — SODIUM CHLORIDE, PRESERVATIVE FREE 2 ML: 5 INJECTION INTRAVENOUS at 09:21

## 2025-02-06 RX ADMIN — ACETAMINOPHEN 1000 MG: 500 TABLET ORAL at 09:24

## 2025-02-06 RX ADMIN — ATORVASTATIN CALCIUM 10 MG: 10 TABLET, FILM COATED ORAL at 21:47

## 2025-02-06 RX ADMIN — ACETAMINOPHEN 1000 MG: 500 TABLET ORAL at 16:55

## 2025-02-06 RX ADMIN — SODIUM CHLORIDE, PRESERVATIVE FREE 2 ML: 5 INJECTION INTRAVENOUS at 21:49

## 2025-02-06 ASSESSMENT — PAIN SCALES - PAIN ASSESSMENT IN ADVANCED DEMENTIA (PAINAD)
BREATHING: NORMAL
BODYLANGUAGE: RELAXED
FACIALEXPRESSION: SMILING OR INEXPRESSIVE
CONSOLABILITY: NO NEED TO CONSOLE
TOTALSCORE: 0

## 2025-02-06 ASSESSMENT — PAIN SCALES - GENERAL: PAINLEVEL_OUTOF10: 0

## 2025-02-07 ENCOUNTER — APPOINTMENT (OUTPATIENT)
Dept: GENERAL RADIOLOGY | Age: 89
DRG: 493 | End: 2025-02-07
Attending: INTERNAL MEDICINE

## 2025-02-07 LAB
ANION GAP SERPL CALC-SCNC: 6 MMOL/L (ref 7–19)
BASOPHILS # BLD: 0 K/MCL (ref 0–0.3)
BASOPHILS NFR BLD: 1 %
BUN SERPL-MCNC: 11 MG/DL (ref 6–20)
BUN/CREAT SERPL: 16 (ref 7–25)
CALCIUM SERPL-MCNC: 8.5 MG/DL (ref 8.4–10.2)
CHLORIDE SERPL-SCNC: 106 MMOL/L (ref 97–110)
CO2 SERPL-SCNC: 29 MMOL/L (ref 21–32)
CREAT SERPL-MCNC: 0.68 MG/DL (ref 0.51–0.95)
DEPRECATED RDW RBC: 50.1 FL (ref 39–50)
EGFRCR SERPLBLD CKD-EPI 2021: 83 ML/MIN/{1.73_M2}
EOSINOPHIL # BLD: 0.7 K/MCL (ref 0–0.5)
EOSINOPHIL NFR BLD: 8 %
ERYTHROCYTE [DISTWIDTH] IN BLOOD: 13.9 % (ref 11–15)
FASTING DURATION TIME PATIENT: ABNORMAL H
GLUCOSE SERPL-MCNC: 99 MG/DL (ref 70–99)
HCT VFR BLD CALC: 31.5 % (ref 36–46.5)
HGB BLD-MCNC: 9.8 G/DL (ref 12–15.5)
IMM GRANULOCYTES # BLD AUTO: 0 K/MCL (ref 0–0.2)
IMM GRANULOCYTES # BLD: 0 %
LYMPHOCYTES # BLD: 1.7 K/MCL (ref 1–4)
LYMPHOCYTES NFR BLD: 20 %
MCH RBC QN AUTO: 30.3 PG (ref 26–34)
MCHC RBC AUTO-ENTMCNC: 31.1 G/DL (ref 32–36.5)
MCV RBC AUTO: 97.5 FL (ref 78–100)
MONOCYTES # BLD: 0.7 K/MCL (ref 0.3–0.9)
MONOCYTES NFR BLD: 8 %
NEUTROPHILS # BLD: 5.3 K/MCL (ref 1.8–7.7)
NEUTROPHILS NFR BLD: 63 %
NRBC BLD MANUAL-RTO: 0 /100 WBC
PLATELET # BLD AUTO: 336 K/MCL (ref 140–450)
POTASSIUM SERPL-SCNC: 4 MMOL/L (ref 3.4–5.1)
RBC # BLD: 3.23 MIL/MCL (ref 4–5.2)
SODIUM SERPL-SCNC: 137 MMOL/L (ref 135–145)
WBC # BLD: 8.4 K/MCL (ref 4.2–11)

## 2025-02-07 PROCEDURE — 97530 THERAPEUTIC ACTIVITIES: CPT

## 2025-02-07 PROCEDURE — 80048 BASIC METABOLIC PNL TOTAL CA: CPT | Performed by: INTERNAL MEDICINE

## 2025-02-07 PROCEDURE — 36415 COLL VENOUS BLD VENIPUNCTURE: CPT | Performed by: INTERNAL MEDICINE

## 2025-02-07 PROCEDURE — 10002803 HB RX 637: Performed by: INTERNAL MEDICINE

## 2025-02-07 PROCEDURE — 92610 EVALUATE SWALLOWING FUNCTION: CPT

## 2025-02-07 PROCEDURE — 99233 SBSQ HOSP IP/OBS HIGH 50: CPT | Performed by: INTERNAL MEDICINE

## 2025-02-07 PROCEDURE — 10000002 HB ROOM CHARGE MED SURG

## 2025-02-07 PROCEDURE — 92611 MOTION FLUOROSCOPY/SWALLOW: CPT

## 2025-02-07 PROCEDURE — 10004651 HB RX, NO CHARGE ITEM: Performed by: STUDENT IN AN ORGANIZED HEALTH CARE EDUCATION/TRAINING PROGRAM

## 2025-02-07 PROCEDURE — 10002800 HB RX 250 W HCPCS: Performed by: STUDENT IN AN ORGANIZED HEALTH CARE EDUCATION/TRAINING PROGRAM

## 2025-02-07 PROCEDURE — 96372 THER/PROPH/DIAG INJ SC/IM: CPT | Performed by: STUDENT IN AN ORGANIZED HEALTH CARE EDUCATION/TRAINING PROGRAM

## 2025-02-07 PROCEDURE — 85025 COMPLETE CBC W/AUTO DIFF WBC: CPT | Performed by: INTERNAL MEDICINE

## 2025-02-07 PROCEDURE — 74230 X-RAY XM SWLNG FUNCJ C+: CPT

## 2025-02-07 RX ORDER — CEFDINIR 300 MG/1
300 CAPSULE ORAL EVERY 12 HOURS SCHEDULED
Qty: 8 CAPSULE | Refills: 0 | Status: SHIPPED | OUTPATIENT
Start: 2025-02-07 | End: 2025-02-11

## 2025-02-07 RX ADMIN — ENOXAPARIN SODIUM 40 MG: 100 INJECTION SUBCUTANEOUS at 09:38

## 2025-02-07 RX ADMIN — DORZOLAMIDE HYDROCHLORIDE AND TIMOLOL MALEATE 1 DROP: 22.3; 6.8 SOLUTION/ DROPS OPHTHALMIC at 20:46

## 2025-02-07 RX ADMIN — AMITRIPTYLINE HYDROCHLORIDE 50 MG: 25 TABLET, FILM COATED ORAL at 20:47

## 2025-02-07 RX ADMIN — SODIUM CHLORIDE, PRESERVATIVE FREE 2 ML: 5 INJECTION INTRAVENOUS at 20:48

## 2025-02-07 RX ADMIN — ACETAMINOPHEN 1000 MG: 500 TABLET ORAL at 05:15

## 2025-02-07 RX ADMIN — LATANOPROST 1 DROP: 50 SOLUTION/ DROPS OPHTHALMIC at 20:46

## 2025-02-07 RX ADMIN — SODIUM CHLORIDE, PRESERVATIVE FREE 2 ML: 5 INJECTION INTRAVENOUS at 20:51

## 2025-02-07 RX ADMIN — SODIUM CHLORIDE, PRESERVATIVE FREE 2 ML: 5 INJECTION INTRAVENOUS at 09:38

## 2025-02-07 RX ADMIN — ACETAMINOPHEN 1000 MG: 500 TABLET ORAL at 17:25

## 2025-02-07 RX ADMIN — ACETAMINOPHEN 1000 MG: 500 TABLET ORAL at 09:38

## 2025-02-07 RX ADMIN — CITALOPRAM 20 MG: 40 TABLET, FILM COATED ORAL at 09:46

## 2025-02-07 RX ADMIN — ATORVASTATIN CALCIUM 10 MG: 10 TABLET, FILM COATED ORAL at 20:47

## 2025-02-07 RX ADMIN — CEFDINIR 300 MG: 300 CAPSULE ORAL at 09:38

## 2025-02-07 RX ADMIN — LORATADINE 10 MG: 10 TABLET ORAL at 09:38

## 2025-02-07 RX ADMIN — SODIUM CHLORIDE, PRESERVATIVE FREE 2 ML: 5 INJECTION INTRAVENOUS at 09:39

## 2025-02-07 RX ADMIN — ACETAMINOPHEN 1000 MG: 500 TABLET ORAL at 21:57

## 2025-02-07 RX ADMIN — DONEPEZIL HYDROCHLORIDE 5 MG: 5 TABLET ORAL at 20:47

## 2025-02-07 RX ADMIN — CEFDINIR 300 MG: 300 CAPSULE ORAL at 20:47

## 2025-02-07 RX ADMIN — DORZOLAMIDE HYDROCHLORIDE AND TIMOLOL MALEATE 1 DROP: 22.3; 6.8 SOLUTION/ DROPS OPHTHALMIC at 09:41

## 2025-02-07 ASSESSMENT — COGNITIVE AND FUNCTIONAL STATUS - GENERAL
UNDERSTANDING 10 TO 15 MIN SPEECH: A LOT
APPLIED_COGNITIVE_CONVERTED_SCORE: 28.82
REMEMBERING 5 ERRANDS WITH NO LIST: A LOT
FOLLOWS FAMILIAR CONVERSATION: A LOT
REMEMBERING TO TAKE MEDICATION: UNABLE
APPLIED_COGNITIVE_RAW_SCORE: 10
BASIC_MOBILITY_RAW_SCORE: 9
UNDERSTANDING 10 TO 15 MIN SPEECH: A LOT
REMEMBERING TO TAKE MEDICATION: A LOT
APPLIED_COGNITIVE_CONVERTED_SCORE: 24.98
APPLIED_COGNITIVE_RAW_SCORE: 12
TAKING CARE OF COMPLICATED TASKS: A LOT
FOLLOWS FAMILIAR CONVERSATION: A LITTLE
TAKING CARE OF COMPLICATED TASKS: UNABLE
BASIC_MOBILITY_CONVERTED_SCORE: 25.80
REMEMBERING WHERE THINGS ARE: A LOT
REMEMBERING 5 ERRANDS WITH NO LIST: UNABLE
REMEMBERING WHERE THINGS ARE: A LOT

## 2025-02-07 ASSESSMENT — PAIN SCALES - PAIN ASSESSMENT IN ADVANCED DEMENTIA (PAINAD)
BREATHING: NORMAL
BODYLANGUAGE: RELAXED
BODYLANGUAGE: RELAXED
BREATHING: NORMAL
CONSOLABILITY: NO NEED TO CONSOLE
TOTALSCORE: 0
FACIALEXPRESSION: SMILING OR INEXPRESSIVE
CONSOLABILITY: NO NEED TO CONSOLE
BREATHING: NORMAL
BODYLANGUAGE: RELAXED
TOTALSCORE: 0
TOTALSCORE: 0
CONSOLABILITY: NO NEED TO CONSOLE
FACIALEXPRESSION: SMILING OR INEXPRESSIVE
FACIALEXPRESSION: SMILING OR INEXPRESSIVE

## 2025-02-07 ASSESSMENT — PAIN SCALES - GENERAL
PAINLEVEL_OUTOF10: 0
PAINLEVEL_OUTOF10: 5
PAINLEVEL_OUTOF10: 0

## 2025-02-08 LAB
ANION GAP SERPL CALC-SCNC: 6 MMOL/L (ref 7–19)
BASOPHILS # BLD: 0.1 K/MCL (ref 0–0.3)
BASOPHILS NFR BLD: 1 %
BUN SERPL-MCNC: 10 MG/DL (ref 6–20)
BUN/CREAT SERPL: 15 (ref 7–25)
CALCIUM SERPL-MCNC: 8.4 MG/DL (ref 8.4–10.2)
CHLORIDE SERPL-SCNC: 107 MMOL/L (ref 97–110)
CO2 SERPL-SCNC: 29 MMOL/L (ref 21–32)
CREAT SERPL-MCNC: 0.67 MG/DL (ref 0.51–0.95)
DEPRECATED RDW RBC: 51 FL (ref 39–50)
EGFRCR SERPLBLD CKD-EPI 2021: 83 ML/MIN/{1.73_M2}
EOSINOPHIL # BLD: 0.7 K/MCL (ref 0–0.5)
EOSINOPHIL NFR BLD: 9 %
ERYTHROCYTE [DISTWIDTH] IN BLOOD: 13.9 % (ref 11–15)
FASTING DURATION TIME PATIENT: ABNORMAL H
GLUCOSE SERPL-MCNC: 95 MG/DL (ref 70–99)
HCT VFR BLD CALC: 32.3 % (ref 36–46.5)
HGB BLD-MCNC: 10 G/DL (ref 12–15.5)
IMM GRANULOCYTES # BLD AUTO: 0 K/MCL (ref 0–0.2)
IMM GRANULOCYTES # BLD: 1 %
LYMPHOCYTES # BLD: 1.9 K/MCL (ref 1–4)
LYMPHOCYTES NFR BLD: 23 %
MCH RBC QN AUTO: 30.6 PG (ref 26–34)
MCHC RBC AUTO-ENTMCNC: 31 G/DL (ref 32–36.5)
MCV RBC AUTO: 98.8 FL (ref 78–100)
MONOCYTES # BLD: 0.7 K/MCL (ref 0.3–0.9)
MONOCYTES NFR BLD: 8 %
NEUTROPHILS # BLD: 5.1 K/MCL (ref 1.8–7.7)
NEUTROPHILS NFR BLD: 58 %
NRBC BLD MANUAL-RTO: 0 /100 WBC
PLATELET # BLD AUTO: 340 K/MCL (ref 140–450)
POTASSIUM SERPL-SCNC: 3.8 MMOL/L (ref 3.4–5.1)
RBC # BLD: 3.27 MIL/MCL (ref 4–5.2)
SODIUM SERPL-SCNC: 138 MMOL/L (ref 135–145)
WBC # BLD: 8.5 K/MCL (ref 4.2–11)

## 2025-02-08 PROCEDURE — 92526 ORAL FUNCTION THERAPY: CPT

## 2025-02-08 PROCEDURE — 80048 BASIC METABOLIC PNL TOTAL CA: CPT | Performed by: INTERNAL MEDICINE

## 2025-02-08 PROCEDURE — 96372 THER/PROPH/DIAG INJ SC/IM: CPT | Performed by: STUDENT IN AN ORGANIZED HEALTH CARE EDUCATION/TRAINING PROGRAM

## 2025-02-08 PROCEDURE — 99233 SBSQ HOSP IP/OBS HIGH 50: CPT | Performed by: INTERNAL MEDICINE

## 2025-02-08 PROCEDURE — 85025 COMPLETE CBC W/AUTO DIFF WBC: CPT | Performed by: INTERNAL MEDICINE

## 2025-02-08 PROCEDURE — 97535 SELF CARE MNGMENT TRAINING: CPT

## 2025-02-08 PROCEDURE — 10000002 HB ROOM CHARGE MED SURG

## 2025-02-08 PROCEDURE — 10002803 HB RX 637: Performed by: INTERNAL MEDICINE

## 2025-02-08 PROCEDURE — 10004651 HB RX, NO CHARGE ITEM: Performed by: STUDENT IN AN ORGANIZED HEALTH CARE EDUCATION/TRAINING PROGRAM

## 2025-02-08 PROCEDURE — 10002800 HB RX 250 W HCPCS: Performed by: STUDENT IN AN ORGANIZED HEALTH CARE EDUCATION/TRAINING PROGRAM

## 2025-02-08 PROCEDURE — 36415 COLL VENOUS BLD VENIPUNCTURE: CPT | Performed by: INTERNAL MEDICINE

## 2025-02-08 PROCEDURE — 10002803 HB RX 637: Performed by: STUDENT IN AN ORGANIZED HEALTH CARE EDUCATION/TRAINING PROGRAM

## 2025-02-08 RX ORDER — BISACODYL 10 MG
10 SUPPOSITORY, RECTAL RECTAL DAILY PRN
Status: DISCONTINUED | OUTPATIENT
Start: 2025-02-08 | End: 2025-02-10 | Stop reason: HOSPADM

## 2025-02-08 RX ADMIN — DORZOLAMIDE HYDROCHLORIDE AND TIMOLOL MALEATE 1 DROP: 22.3; 6.8 SOLUTION/ DROPS OPHTHALMIC at 12:47

## 2025-02-08 RX ADMIN — ACETAMINOPHEN 1000 MG: 500 TABLET ORAL at 21:09

## 2025-02-08 RX ADMIN — ATORVASTATIN CALCIUM 10 MG: 10 TABLET, FILM COATED ORAL at 21:09

## 2025-02-08 RX ADMIN — AMITRIPTYLINE HYDROCHLORIDE 50 MG: 25 TABLET, FILM COATED ORAL at 21:09

## 2025-02-08 RX ADMIN — DONEPEZIL HYDROCHLORIDE 5 MG: 5 TABLET ORAL at 21:09

## 2025-02-08 RX ADMIN — CEFDINIR 300 MG: 300 CAPSULE ORAL at 21:09

## 2025-02-08 RX ADMIN — LORATADINE 10 MG: 10 TABLET ORAL at 12:41

## 2025-02-08 RX ADMIN — CEFDINIR 300 MG: 300 CAPSULE ORAL at 12:41

## 2025-02-08 RX ADMIN — ACETAMINOPHEN 1000 MG: 500 TABLET ORAL at 12:41

## 2025-02-08 RX ADMIN — BISACODYL 10 MG: 10 SUPPOSITORY RECTAL at 22:00

## 2025-02-08 RX ADMIN — DORZOLAMIDE HYDROCHLORIDE AND TIMOLOL MALEATE 1 DROP: 22.3; 6.8 SOLUTION/ DROPS OPHTHALMIC at 21:10

## 2025-02-08 RX ADMIN — ENOXAPARIN SODIUM 40 MG: 100 INJECTION SUBCUTANEOUS at 12:43

## 2025-02-08 RX ADMIN — LATANOPROST 1 DROP: 50 SOLUTION/ DROPS OPHTHALMIC at 21:10

## 2025-02-08 RX ADMIN — SODIUM CHLORIDE, PRESERVATIVE FREE 2 ML: 5 INJECTION INTRAVENOUS at 21:09

## 2025-02-08 RX ADMIN — POLYETHYLENE GLYCOL (3350) 17 G: 17 POWDER, FOR SOLUTION ORAL at 19:27

## 2025-02-08 RX ADMIN — CITALOPRAM 20 MG: 40 TABLET, FILM COATED ORAL at 12:41

## 2025-02-08 ASSESSMENT — PAIN SCALES - PAIN ASSESSMENT IN ADVANCED DEMENTIA (PAINAD)
BREATHING: NORMAL
TOTALSCORE: 0
FACIALEXPRESSION: SMILING OR INEXPRESSIVE
CONSOLABILITY: NO NEED TO CONSOLE
BODYLANGUAGE: RELAXED

## 2025-02-08 ASSESSMENT — PAIN SCALES - GENERAL
PAINLEVEL_OUTOF10: 5
PAINLEVEL_OUTOF10: 6
PAINLEVEL_OUTOF10: 2
PAINLEVEL_OUTOF10: 5

## 2025-02-08 ASSESSMENT — COGNITIVE AND FUNCTIONAL STATUS - GENERAL
HELP NEEDED FOR BATHING: A LOT
HELP NEEDED FOR PERSONAL GROOMING: A LITTLE
HELP NEEDED DRESSING REGULAR LOWER BODY CLOTHING: TOTAL
HELP NEEDED FOR TOILETING: TOTAL
HELP NEEDED DRESSING REGULAR UPPER BODY CLOTHING: A LITTLE
DAILY_ACTIVITY_RAW_SCORE: 14
DAILY_ACTIVITY_CONVERTED_SCORE: 33.39

## 2025-02-08 ASSESSMENT — ACTIVITIES OF DAILY LIVING (ADL): HOME_MANAGEMENT_TIME_ENTRY: 20

## 2025-02-09 VITALS
RESPIRATION RATE: 15 BRPM | HEART RATE: 90 BPM | BODY MASS INDEX: 29.81 KG/M2 | SYSTOLIC BLOOD PRESSURE: 130 MMHG | DIASTOLIC BLOOD PRESSURE: 77 MMHG | HEIGHT: 64 IN | OXYGEN SATURATION: 95 % | TEMPERATURE: 98.1 F | WEIGHT: 174.6 LBS

## 2025-02-09 LAB
ANION GAP SERPL CALC-SCNC: 10 MMOL/L (ref 7–19)
BASOPHILS # BLD: 0 K/MCL (ref 0–0.3)
BASOPHILS NFR BLD: 0 %
BUN SERPL-MCNC: 11 MG/DL (ref 6–20)
BUN/CREAT SERPL: 16 (ref 7–25)
CALCIUM SERPL-MCNC: 8.6 MG/DL (ref 8.4–10.2)
CHLORIDE SERPL-SCNC: 103 MMOL/L (ref 97–110)
CO2 SERPL-SCNC: 25 MMOL/L (ref 21–32)
CREAT SERPL-MCNC: 0.67 MG/DL (ref 0.51–0.95)
DEPRECATED RDW RBC: 48.7 FL (ref 39–50)
EGFRCR SERPLBLD CKD-EPI 2021: 83 ML/MIN/{1.73_M2}
EOSINOPHIL # BLD: 0.7 K/MCL (ref 0–0.5)
EOSINOPHIL NFR BLD: 6 %
ERYTHROCYTE [DISTWIDTH] IN BLOOD: 13.8 % (ref 11–15)
FASTING DURATION TIME PATIENT: ABNORMAL H
FLUAV RNA RESP QL NAA+PROBE: NOT DETECTED
FLUBV RNA RESP QL NAA+PROBE: NOT DETECTED
GLUCOSE SERPL-MCNC: 104 MG/DL (ref 70–99)
HCT VFR BLD CALC: 31.8 % (ref 36–46.5)
HGB BLD-MCNC: 10.1 G/DL (ref 12–15.5)
IMM GRANULOCYTES # BLD AUTO: 0 K/MCL (ref 0–0.2)
IMM GRANULOCYTES # BLD: 0 %
LYMPHOCYTES # BLD: 1.9 K/MCL (ref 1–4)
LYMPHOCYTES NFR BLD: 18 %
MCH RBC QN AUTO: 30.3 PG (ref 26–34)
MCHC RBC AUTO-ENTMCNC: 31.8 G/DL (ref 32–36.5)
MCV RBC AUTO: 95.5 FL (ref 78–100)
MONOCYTES # BLD: 0.8 K/MCL (ref 0.3–0.9)
MONOCYTES NFR BLD: 8 %
NEUTROPHILS # BLD: 7.2 K/MCL (ref 1.8–7.7)
NEUTROPHILS NFR BLD: 68 %
NRBC BLD MANUAL-RTO: 0 /100 WBC
PLATELET # BLD AUTO: 373 K/MCL (ref 140–450)
POTASSIUM SERPL-SCNC: 4 MMOL/L (ref 3.4–5.1)
RBC # BLD: 3.33 MIL/MCL (ref 4–5.2)
RSV AG NPH QL IA.RAPID: NOT DETECTED
SARS-COV-2 RNA RESP QL NAA+PROBE: NOT DETECTED
SERVICE CMNT-IMP: NORMAL
SERVICE CMNT-IMP: NORMAL
SODIUM SERPL-SCNC: 134 MMOL/L (ref 135–145)
WBC # BLD: 10.6 K/MCL (ref 4.2–11)

## 2025-02-09 PROCEDURE — 80048 BASIC METABOLIC PNL TOTAL CA: CPT | Performed by: INTERNAL MEDICINE

## 2025-02-09 PROCEDURE — 85025 COMPLETE CBC W/AUTO DIFF WBC: CPT | Performed by: INTERNAL MEDICINE

## 2025-02-09 PROCEDURE — 99233 SBSQ HOSP IP/OBS HIGH 50: CPT | Performed by: INTERNAL MEDICINE

## 2025-02-09 PROCEDURE — 10004651 HB RX, NO CHARGE ITEM: Performed by: STUDENT IN AN ORGANIZED HEALTH CARE EDUCATION/TRAINING PROGRAM

## 2025-02-09 PROCEDURE — 36415 COLL VENOUS BLD VENIPUNCTURE: CPT | Performed by: INTERNAL MEDICINE

## 2025-02-09 PROCEDURE — 96372 THER/PROPH/DIAG INJ SC/IM: CPT | Performed by: STUDENT IN AN ORGANIZED HEALTH CARE EDUCATION/TRAINING PROGRAM

## 2025-02-09 PROCEDURE — 92526 ORAL FUNCTION THERAPY: CPT

## 2025-02-09 PROCEDURE — 10000002 HB ROOM CHARGE MED SURG

## 2025-02-09 PROCEDURE — 10002803 HB RX 637: Performed by: INTERNAL MEDICINE

## 2025-02-09 PROCEDURE — 0241U COVID/FLU/RSV PANEL: CPT | Performed by: INTERNAL MEDICINE

## 2025-02-09 PROCEDURE — 10002800 HB RX 250 W HCPCS: Performed by: STUDENT IN AN ORGANIZED HEALTH CARE EDUCATION/TRAINING PROGRAM

## 2025-02-09 RX ORDER — PANTOPRAZOLE SODIUM 40 MG/1
40 TABLET, DELAYED RELEASE ORAL
Status: DISCONTINUED | OUTPATIENT
Start: 2025-02-09 | End: 2025-02-10 | Stop reason: HOSPADM

## 2025-02-09 RX ADMIN — ENOXAPARIN SODIUM 40 MG: 100 INJECTION SUBCUTANEOUS at 08:44

## 2025-02-09 RX ADMIN — DORZOLAMIDE HYDROCHLORIDE AND TIMOLOL MALEATE 1 DROP: 22.3; 6.8 SOLUTION/ DROPS OPHTHALMIC at 20:53

## 2025-02-09 RX ADMIN — ACETAMINOPHEN 1000 MG: 500 TABLET ORAL at 08:43

## 2025-02-09 RX ADMIN — SODIUM CHLORIDE, PRESERVATIVE FREE 2 ML: 5 INJECTION INTRAVENOUS at 20:45

## 2025-02-09 RX ADMIN — CEFDINIR 300 MG: 300 CAPSULE ORAL at 08:43

## 2025-02-09 RX ADMIN — PANTOPRAZOLE SODIUM 40 MG: 40 TABLET, DELAYED RELEASE ORAL at 14:25

## 2025-02-09 RX ADMIN — CITALOPRAM 20 MG: 40 TABLET, FILM COATED ORAL at 08:59

## 2025-02-09 RX ADMIN — LORATADINE 10 MG: 10 TABLET ORAL at 08:43

## 2025-02-09 RX ADMIN — LATANOPROST 1 DROP: 50 SOLUTION/ DROPS OPHTHALMIC at 20:53

## 2025-02-09 RX ADMIN — ATORVASTATIN CALCIUM 10 MG: 10 TABLET, FILM COATED ORAL at 20:46

## 2025-02-09 RX ADMIN — AMITRIPTYLINE HYDROCHLORIDE 50 MG: 25 TABLET, FILM COATED ORAL at 20:46

## 2025-02-09 RX ADMIN — ACETAMINOPHEN 1000 MG: 500 TABLET ORAL at 20:45

## 2025-02-09 RX ADMIN — DORZOLAMIDE HYDROCHLORIDE AND TIMOLOL MALEATE 1 DROP: 22.3; 6.8 SOLUTION/ DROPS OPHTHALMIC at 08:51

## 2025-02-09 RX ADMIN — CEFDINIR 300 MG: 300 CAPSULE ORAL at 20:46

## 2025-02-09 RX ADMIN — DONEPEZIL HYDROCHLORIDE 5 MG: 5 TABLET ORAL at 20:46

## 2025-02-09 ASSESSMENT — PAIN SCALES - PAIN ASSESSMENT IN ADVANCED DEMENTIA (PAINAD)
BODYLANGUAGE: RELAXED
FACIALEXPRESSION: SMILING OR INEXPRESSIVE
TOTALSCORE: 0
BREATHING: NORMAL
CONSOLABILITY: NO NEED TO CONSOLE

## 2025-02-09 ASSESSMENT — PAIN SCALES - GENERAL
PAINLEVEL_OUTOF10: 2
PAINLEVEL_OUTOF10: 4
PAINLEVEL_OUTOF10: 0
PAINLEVEL_OUTOF10: 4

## 2025-02-10 VITALS
HEIGHT: 64 IN | SYSTOLIC BLOOD PRESSURE: 125 MMHG | BODY MASS INDEX: 29.81 KG/M2 | TEMPERATURE: 97.7 F | WEIGHT: 174.6 LBS | RESPIRATION RATE: 16 BRPM | HEART RATE: 91 BPM | OXYGEN SATURATION: 95 % | DIASTOLIC BLOOD PRESSURE: 84 MMHG

## 2025-02-10 LAB
ANION GAP SERPL CALC-SCNC: 11 MMOL/L (ref 7–19)
BASOPHILS # BLD: 0 K/MCL (ref 0–0.3)
BASOPHILS NFR BLD: 1 %
BUN SERPL-MCNC: 11 MG/DL (ref 6–20)
BUN/CREAT SERPL: 19 (ref 7–25)
CALCIUM SERPL-MCNC: 9.5 MG/DL (ref 8.4–10.2)
CHLORIDE SERPL-SCNC: 105 MMOL/L (ref 97–110)
CO2 SERPL-SCNC: 26 MMOL/L (ref 21–32)
CREAT SERPL-MCNC: 0.58 MG/DL (ref 0.51–0.95)
DEPRECATED RDW RBC: 48.2 FL (ref 39–50)
EGFRCR SERPLBLD CKD-EPI 2021: 86 ML/MIN/{1.73_M2}
EOSINOPHIL # BLD: 0.6 K/MCL (ref 0–0.5)
EOSINOPHIL NFR BLD: 8 %
ERYTHROCYTE [DISTWIDTH] IN BLOOD: 13.8 % (ref 11–15)
FASTING DURATION TIME PATIENT: ABNORMAL H
GLUCOSE SERPL-MCNC: 103 MG/DL (ref 70–99)
HCT VFR BLD CALC: 32.9 % (ref 36–46.5)
HGB BLD-MCNC: 10.5 G/DL (ref 12–15.5)
IMM GRANULOCYTES # BLD AUTO: 0 K/MCL (ref 0–0.2)
IMM GRANULOCYTES # BLD: 1 %
LYMPHOCYTES # BLD: 2.2 K/MCL (ref 1–4)
LYMPHOCYTES NFR BLD: 27 %
MCH RBC QN AUTO: 30.5 PG (ref 26–34)
MCHC RBC AUTO-ENTMCNC: 31.9 G/DL (ref 32–36.5)
MCV RBC AUTO: 95.6 FL (ref 78–100)
MONOCYTES # BLD: 0.8 K/MCL (ref 0.3–0.9)
MONOCYTES NFR BLD: 10 %
NEUTROPHILS # BLD: 4.4 K/MCL (ref 1.8–7.7)
NEUTROPHILS NFR BLD: 53 %
NRBC BLD MANUAL-RTO: 0 /100 WBC
PLATELET # BLD AUTO: 390 K/MCL (ref 140–450)
POTASSIUM SERPL-SCNC: 4 MMOL/L (ref 3.4–5.1)
RBC # BLD: 3.44 MIL/MCL (ref 4–5.2)
SODIUM SERPL-SCNC: 138 MMOL/L (ref 135–145)
WBC # BLD: 8 K/MCL (ref 4.2–11)

## 2025-02-10 PROCEDURE — 36415 COLL VENOUS BLD VENIPUNCTURE: CPT | Performed by: INTERNAL MEDICINE

## 2025-02-10 PROCEDURE — 85025 COMPLETE CBC W/AUTO DIFF WBC: CPT | Performed by: INTERNAL MEDICINE

## 2025-02-10 PROCEDURE — 80048 BASIC METABOLIC PNL TOTAL CA: CPT | Performed by: INTERNAL MEDICINE

## 2025-02-10 PROCEDURE — 10002800 HB RX 250 W HCPCS: Performed by: STUDENT IN AN ORGANIZED HEALTH CARE EDUCATION/TRAINING PROGRAM

## 2025-02-10 PROCEDURE — 96372 THER/PROPH/DIAG INJ SC/IM: CPT | Performed by: STUDENT IN AN ORGANIZED HEALTH CARE EDUCATION/TRAINING PROGRAM

## 2025-02-10 PROCEDURE — 99239 HOSP IP/OBS DSCHRG MGMT >30: CPT | Performed by: INTERNAL MEDICINE

## 2025-02-10 PROCEDURE — 10004651 HB RX, NO CHARGE ITEM: Performed by: STUDENT IN AN ORGANIZED HEALTH CARE EDUCATION/TRAINING PROGRAM

## 2025-02-10 PROCEDURE — 10002803 HB RX 637: Performed by: INTERNAL MEDICINE

## 2025-02-10 RX ADMIN — ACETAMINOPHEN 1000 MG: 500 TABLET ORAL at 10:31

## 2025-02-10 RX ADMIN — PANTOPRAZOLE SODIUM 40 MG: 40 TABLET, DELAYED RELEASE ORAL at 05:12

## 2025-02-10 RX ADMIN — DORZOLAMIDE HYDROCHLORIDE AND TIMOLOL MALEATE 1 DROP: 22.3; 6.8 SOLUTION/ DROPS OPHTHALMIC at 08:55

## 2025-02-10 RX ADMIN — LORATADINE 10 MG: 10 TABLET ORAL at 08:55

## 2025-02-10 RX ADMIN — CEFDINIR 300 MG: 300 CAPSULE ORAL at 08:55

## 2025-02-10 RX ADMIN — SODIUM CHLORIDE, PRESERVATIVE FREE 2 ML: 5 INJECTION INTRAVENOUS at 08:55

## 2025-02-10 RX ADMIN — CITALOPRAM 20 MG: 40 TABLET, FILM COATED ORAL at 09:01

## 2025-02-10 RX ADMIN — ACETAMINOPHEN 1000 MG: 500 TABLET ORAL at 05:12

## 2025-02-10 RX ADMIN — ENOXAPARIN SODIUM 40 MG: 100 INJECTION SUBCUTANEOUS at 08:55

## 2025-02-10 ASSESSMENT — PAIN SCALES - GENERAL
PAINLEVEL_OUTOF10: 1
PAINLEVEL_OUTOF10: 0
PAINLEVEL_OUTOF10: 0

## 2025-02-11 ENCOUNTER — TELEPHONE (OUTPATIENT)
Dept: ORTHOPEDICS | Age: 89
End: 2025-02-11

## 2025-02-19 ENCOUNTER — APPOINTMENT (OUTPATIENT)
Dept: ORTHOPEDICS | Age: 89
End: 2025-02-19

## 2025-02-19 ENCOUNTER — IMAGING SERVICES (OUTPATIENT)
Dept: GENERAL RADIOLOGY | Age: 89
End: 2025-02-19
Attending: STUDENT IN AN ORGANIZED HEALTH CARE EDUCATION/TRAINING PROGRAM

## 2025-02-19 VITALS — BODY MASS INDEX: 29.88 KG/M2 | WEIGHT: 175 LBS | HEIGHT: 64 IN

## 2025-02-19 DIAGNOSIS — Z87.81 STATUS POST ORIF OF FRACTURE OF ANKLE: ICD-10-CM

## 2025-02-19 DIAGNOSIS — Z98.890 STATUS POST ORIF OF FRACTURE OF ANKLE: Primary | ICD-10-CM

## 2025-02-19 DIAGNOSIS — S82.892A CLOSED LEFT ANKLE FRACTURE, INITIAL ENCOUNTER: ICD-10-CM

## 2025-02-19 DIAGNOSIS — Z87.81 STATUS POST ORIF OF FRACTURE OF ANKLE: Primary | ICD-10-CM

## 2025-02-19 DIAGNOSIS — Z98.890 STATUS POST ORIF OF FRACTURE OF ANKLE: ICD-10-CM

## 2025-02-19 PROCEDURE — 73610 X-RAY EXAM OF ANKLE: CPT | Performed by: RADIOLOGY

## 2025-02-20 ENCOUNTER — APPOINTMENT (OUTPATIENT)
Dept: GASTROENTEROLOGY | Age: 89
End: 2025-02-20

## 2025-03-19 ENCOUNTER — IMAGING SERVICES (OUTPATIENT)
Dept: GENERAL RADIOLOGY | Age: 89
End: 2025-03-19
Attending: STUDENT IN AN ORGANIZED HEALTH CARE EDUCATION/TRAINING PROGRAM

## 2025-03-19 ENCOUNTER — APPOINTMENT (OUTPATIENT)
Dept: ORTHOPEDICS | Age: 89
End: 2025-03-19

## 2025-03-19 DIAGNOSIS — Z98.890 STATUS POST ORIF OF FRACTURE OF ANKLE: Primary | ICD-10-CM

## 2025-03-19 DIAGNOSIS — Z98.890 STATUS POST ORIF OF FRACTURE OF ANKLE: ICD-10-CM

## 2025-03-19 DIAGNOSIS — Z87.81 STATUS POST ORIF OF FRACTURE OF ANKLE: Primary | ICD-10-CM

## 2025-03-19 DIAGNOSIS — Z87.81 STATUS POST ORIF OF FRACTURE OF ANKLE: ICD-10-CM

## 2025-03-19 PROCEDURE — 99024 POSTOP FOLLOW-UP VISIT: CPT | Performed by: STUDENT IN AN ORGANIZED HEALTH CARE EDUCATION/TRAINING PROGRAM

## 2025-03-19 PROCEDURE — 73610 X-RAY EXAM OF ANKLE: CPT | Performed by: RADIOLOGY

## 2025-04-18 ENCOUNTER — HOSPITAL ENCOUNTER (EMERGENCY)
Facility: HOSPITAL | Age: OVER 89
Discharge: HOME | End: 2025-04-18
Attending: EMERGENCY MEDICINE
Payer: MEDICARE

## 2025-04-18 ENCOUNTER — APPOINTMENT (OUTPATIENT)
Dept: GENERAL RADIOLOGY | Facility: HOSPITAL | Age: OVER 89
End: 2025-04-18
Attending: EMERGENCY MEDICINE
Payer: MEDICARE

## 2025-04-18 VITALS
OXYGEN SATURATION: 93 % | SYSTOLIC BLOOD PRESSURE: 140 MMHG | DIASTOLIC BLOOD PRESSURE: 97 MMHG | HEART RATE: 98 BPM | TEMPERATURE: 99 F | RESPIRATION RATE: 27 BRPM

## 2025-04-18 DIAGNOSIS — A49.8 CITROBACTER INFECTION: ICD-10-CM

## 2025-04-18 DIAGNOSIS — N30.90 CYSTITIS: ICD-10-CM

## 2025-04-18 DIAGNOSIS — T78.3XXA ANGIOEDEMA, INITIAL ENCOUNTER: Primary | ICD-10-CM

## 2025-04-18 LAB
ALBUMIN SERPL-MCNC: 3.9 G/DL (ref 3.2–4.8)
ALBUMIN/GLOB SERPL: 1.3 {RATIO} (ref 1–2)
ALP LIVER SERPL-CCNC: 97 U/L (ref 55–142)
ALT SERPL-CCNC: 15 U/L (ref 10–49)
ANION GAP SERPL CALC-SCNC: 9 MMOL/L (ref 0–18)
AST SERPL-CCNC: 20 U/L (ref ?–34)
ATRIAL RATE: 102 BPM
BASOPHILS # BLD AUTO: 0.02 X10(3) UL (ref 0–0.2)
BASOPHILS NFR BLD AUTO: 0.1 %
BILIRUB SERPL-MCNC: 0.4 MG/DL (ref 0.2–0.9)
BILIRUB UR QL STRIP.AUTO: NEGATIVE
BUN BLD-MCNC: 12 MG/DL (ref 9–23)
CALCIUM BLD-MCNC: 8.8 MG/DL (ref 8.7–10.6)
CHLORIDE SERPL-SCNC: 99 MMOL/L (ref 98–112)
CO2 SERPL-SCNC: 25 MMOL/L (ref 21–32)
CREAT BLD-MCNC: 0.78 MG/DL (ref 0.55–1.02)
EGFRCR SERPLBLD CKD-EPI 2021: 72 ML/MIN/1.73M2 (ref 60–?)
EOSINOPHIL # BLD AUTO: 1.22 X10(3) UL (ref 0–0.7)
EOSINOPHIL NFR BLD AUTO: 7.2 %
ERYTHROCYTE [DISTWIDTH] IN BLOOD BY AUTOMATED COUNT: 14.6 %
FLUAV + FLUBV RNA SPEC NAA+PROBE: NEGATIVE
FLUAV + FLUBV RNA SPEC NAA+PROBE: NEGATIVE
GLOBULIN PLAS-MCNC: 2.9 G/DL (ref 2–3.5)
GLUCOSE BLD-MCNC: 116 MG/DL (ref 70–99)
GLUCOSE UR STRIP.AUTO-MCNC: NORMAL MG/DL
HCT VFR BLD AUTO: 39.3 % (ref 35–48)
HGB BLD-MCNC: 12.8 G/DL (ref 12–16)
IMM GRANULOCYTES # BLD AUTO: 0.13 X10(3) UL (ref 0–1)
IMM GRANULOCYTES NFR BLD: 0.8 %
KETONES UR STRIP.AUTO-MCNC: NEGATIVE MG/DL
LEUKOCYTE ESTERASE UR QL STRIP.AUTO: 500
LYMPHOCYTES # BLD AUTO: 0.53 X10(3) UL (ref 1–4)
LYMPHOCYTES NFR BLD AUTO: 3.1 %
MCH RBC QN AUTO: 30.8 PG (ref 26–34)
MCHC RBC AUTO-ENTMCNC: 32.6 G/DL (ref 31–37)
MCV RBC AUTO: 94.7 FL (ref 80–100)
MONOCYTES # BLD AUTO: 0.65 X10(3) UL (ref 0.1–1)
MONOCYTES NFR BLD AUTO: 3.8 %
NEUTROPHILS # BLD AUTO: 14.5 X10 (3) UL (ref 1.5–7.7)
NEUTROPHILS # BLD AUTO: 14.5 X10(3) UL (ref 1.5–7.7)
NEUTROPHILS NFR BLD AUTO: 85 %
NITRITE UR QL STRIP.AUTO: NEGATIVE
NT-PROBNP SERPL-MCNC: 525 PG/ML (ref ?–450)
OSMOLALITY SERPL CALC.SUM OF ELEC: 277 MOSM/KG (ref 275–295)
P AXIS: 46 DEGREES
P-R INTERVAL: 196 MS
PH UR STRIP.AUTO: 7.5 [PH] (ref 5–8)
PLATELET # BLD AUTO: 285 10(3)UL (ref 150–450)
POTASSIUM SERPL-SCNC: 4.3 MMOL/L (ref 3.5–5.1)
PROT SERPL-MCNC: 6.8 G/DL (ref 5.7–8.2)
PROT UR STRIP.AUTO-MCNC: NEGATIVE MG/DL
Q-T INTERVAL: 368 MS
QRS DURATION: 76 MS
QTC CALCULATION (BEZET): 479 MS
R AXIS: 21 DEGREES
RBC # BLD AUTO: 4.15 X10(6)UL (ref 3.8–5.3)
RBC #/AREA URNS AUTO: >10 /HPF
RSV RNA SPEC NAA+PROBE: NEGATIVE
SARS-COV-2 RNA RESP QL NAA+PROBE: NOT DETECTED
SODIUM SERPL-SCNC: 133 MMOL/L (ref 136–145)
SP GR UR STRIP.AUTO: 1.01 (ref 1–1.03)
T AXIS: 28 DEGREES
TROPONIN I SERPL HS-MCNC: 3 NG/L (ref ?–34)
UROBILINOGEN UR STRIP.AUTO-MCNC: NORMAL MG/DL
VENTRICULAR RATE: 102 BPM
WBC # BLD AUTO: 17.1 X10(3) UL (ref 4–11)

## 2025-04-18 PROCEDURE — 83880 ASSAY OF NATRIURETIC PEPTIDE: CPT | Performed by: EMERGENCY MEDICINE

## 2025-04-18 PROCEDURE — 96374 THER/PROPH/DIAG INJ IV PUSH: CPT

## 2025-04-18 PROCEDURE — 99285 EMERGENCY DEPT VISIT HI MDM: CPT

## 2025-04-18 PROCEDURE — 87086 URINE CULTURE/COLONY COUNT: CPT | Performed by: EMERGENCY MEDICINE

## 2025-04-18 PROCEDURE — 93005 ELECTROCARDIOGRAM TRACING: CPT

## 2025-04-18 PROCEDURE — 84484 ASSAY OF TROPONIN QUANT: CPT | Performed by: EMERGENCY MEDICINE

## 2025-04-18 PROCEDURE — 94640 AIRWAY INHALATION TREATMENT: CPT

## 2025-04-18 PROCEDURE — 93010 ELECTROCARDIOGRAM REPORT: CPT

## 2025-04-18 PROCEDURE — 71045 X-RAY EXAM CHEST 1 VIEW: CPT | Performed by: EMERGENCY MEDICINE

## 2025-04-18 PROCEDURE — 0241U SARS-COV-2/FLU A AND B/RSV BY PCR (GENEXPERT): CPT | Performed by: EMERGENCY MEDICINE

## 2025-04-18 PROCEDURE — 96375 TX/PRO/DX INJ NEW DRUG ADDON: CPT

## 2025-04-18 PROCEDURE — 81001 URINALYSIS AUTO W/SCOPE: CPT | Performed by: EMERGENCY MEDICINE

## 2025-04-18 PROCEDURE — 85025 COMPLETE CBC W/AUTO DIFF WBC: CPT | Performed by: EMERGENCY MEDICINE

## 2025-04-18 PROCEDURE — 80053 COMPREHEN METABOLIC PANEL: CPT | Performed by: EMERGENCY MEDICINE

## 2025-04-18 RX ORDER — METHYLPREDNISOLONE SODIUM SUCCINATE 125 MG/2ML
125 INJECTION INTRAMUSCULAR; INTRAVENOUS ONCE
Status: COMPLETED | OUTPATIENT
Start: 2025-04-18 | End: 2025-04-18

## 2025-04-18 RX ORDER — DIPHENHYDRAMINE HYDROCHLORIDE 50 MG/ML
25 INJECTION, SOLUTION INTRAMUSCULAR; INTRAVENOUS ONCE
Status: COMPLETED | OUTPATIENT
Start: 2025-04-18 | End: 2025-04-18

## 2025-04-18 RX ORDER — IPRATROPIUM BROMIDE AND ALBUTEROL SULFATE 2.5; .5 MG/3ML; MG/3ML
3 SOLUTION RESPIRATORY (INHALATION) ONCE
Status: COMPLETED | OUTPATIENT
Start: 2025-04-18 | End: 2025-04-18

## 2025-04-18 RX ORDER — LEVOFLOXACIN 250 MG/1
250 TABLET, FILM COATED ORAL ONCE
Status: DISCONTINUED | OUTPATIENT
Start: 2025-04-18 | End: 2025-04-18 | Stop reason: DRUGHIGH

## 2025-04-18 RX ORDER — LEVOFLOXACIN 250 MG/1
500 TABLET, FILM COATED ORAL ONCE
Status: COMPLETED | OUTPATIENT
Start: 2025-04-18 | End: 2025-04-18

## 2025-04-18 RX ORDER — LEVOFLOXACIN 250 MG/1
250 TABLET, FILM COATED ORAL DAILY
Qty: 10 TABLET | Refills: 0 | Status: SHIPPED | OUTPATIENT
Start: 2025-04-18 | End: 2025-04-28

## 2025-04-18 NOTE — ED PROVIDER NOTES
Patient Seen in: [facility] Emergency Department      History     Chief Complaint   Patient presents with    Allergic Rxn Allergies     Stated Complaint:     Subjective:     HPI    89-year-old woman with anxiety/depression and dysphagia who resides in [facility] and staff reported truncal rash and lip swelling.  She is somewhat short of breath.  She started Macrobid 2 days ago.  She received 25 mg of Benadryl before coming to the emergency department. There is no reported chest pain.    Daughter relates that she had a urinalysis at [facility] last week that grew in a resistant organism and antibiotic switched to Macrobid 2 days ago.    Objective:   No pertinent past medical history.            No pertinent past surgical history.              No pertinent social history.            Review of Systems    Positive for stated complaint:   Other systems are as noted in HPI.  Constitutional and vital signs reviewed.      All other systems reviewed and negative except as noted above.    Physical Exam     ED Triage Vitals [04/18/25 0831]   /81   Pulse 100   Resp 20   Temp 98.5 °F (36.9 °C)   Temp src Temporal   SpO2 92 %   O2 Device Nasal cannula       Current:BP (!) 140/97   Pulse 98   Temp 98.5 °F (36.9 °C) (Temporal)   Resp (!) 27   SpO2 93%     General:  Vitals as listed.  No acute distress   HEENT: Sclerae anicteric.  Conjunctivae show no pallor.  Oropharynx clear with no posterior pharyngeal or uvular edema, mucous membranes moist.  Mild lip swelling  Lungs: Decreased air exchange and rales USP up  Heart: regular rate rhythm and no murmur   Abdomen: Soft and nontender.  No abdominal masses.  No peritoneal signs   Extremities: Minimal bilateral pretibial edema, normal peripheral pulses.  No lower extremity asymmetry  Neuro: Alert, slow to respond to questions and nonfocal    Vitals:    04/18/25 0831 04/18/25 1015 04/18/25 1045 04/18/25 1215   BP: 147/81  (!) 140/97    Pulse: 100 99 96  98   Resp: 20 (!) 27 23 (!) 27   Temp: 98.5 °F (36.9 °C)      TempSrc: Temporal      SpO2: 92% (!) 89% 97% 93%       ED Course     Labs Reviewed   COMP METABOLIC PANEL (14) - Abnormal; Notable for the following components:       Result Value    Glucose 116 (*)     Sodium 133 (*)     All other components within normal limits   CBC WITH DIFFERENTIAL WITH PLATELET - Abnormal; Notable for the following components:    WBC 17.1 (*)     Neutrophil Absolute Prelim 14.50 (*)     Neutrophil Absolute 14.50 (*)     Lymphocyte Absolute 0.53 (*)     Eosinophil Absolute 1.22 (*)     All other components within normal limits   PRO BETA NATRIURETIC PEPTIDE - Abnormal; Notable for the following components:    Pro-Beta Natriuretic Peptide 525 (*)     All other components within normal limits   URINALYSIS WITH CULTURE REFLEX - Abnormal; Notable for the following components:    Clarity Urine Turbid (*)     Blood Urine 2+ (*)     Leukocyte Esterase Urine 500 (*)     WBC Urine 21-50 (*)     RBC Urine >10 (*)     Bacteria Urine Rare (*)     Squamous Epi. Cells Few (*)     All other components within normal limits   TROPONIN I HIGH SENSITIVITY - Normal   SARS-COV-2/FLU A AND B/RSV BY PCR (GENEXPERT) - Normal    Narrative:     This test is intended for the qualitative detection and differentiation of SARS-CoV-2, influenza A, influenza B, and respiratory syncytial virus (RSV) viral RNA in nasopharyngeal or nares swabs from individuals suspected of respiratory viral infection consistent with COVID-19 by their healthcare provider. Signs and symptoms of respiratory viral infection due to SARS-CoV-2, influenza, and RSV can be similar.    Test performed using the Xpert Xpress SARS-CoV-2/FLU/RSV (real time RT-PCR)  assay on the [employer] instrument, [employer], [place] [address].   This test is being used under the Food and Drug Administration's Emergency Use Authorization.    The authorized Fact Sheet for Healthcare Providers for this assay is  available upon request from the laboratory.   SCAN SLIDE   URINE CULTURE, ROUTINE     XR CHEST AP PORTABLE  (CPT=71045)  Result Date: 4/18/2025  CONCLUSION:  Subsegmental atelectasis at the left lung base with small left pleural effusion.   LOCATION:  [facility]      Dictated by (CST): Yves Berger MD on 4/18/2025 at 9:17 AM     Finalized by (CST): Yves Berger MD on 4/18/2025 at 9:17 AM     I independently read the radiographs and no pulmonary edema on chest x-ray  EKG    Rate, intervals and axes as noted on EKG Report.  Rate: 102  Rhythm: Sinus Rhythm  Reading: No acute ST-T changes           ED COURSE and MDM     Sources of the medical history included the patient and her daughter    Differential diagnosis before testing included allergic reaction causing angioedema versus anaphylaxis, a medical condition that poses a threat to life.    I reviewed prior external notes including discharge summary from her 2/3/2025 admission for her ankle fracture.  Echocardiogram done 3/28/2024 showed an ejection fraction of 60 to 65% with no wall motion abnormalities or significant valvular disease.    Laboratory records obtained from [facility] residence.  Urinalysis that was done for 1425 showed greater than 100,000 colonies per mL of Citrobacter that was resistant to ampicillin and cefazolin and amikacin.  That was sensitive to Bactrim and Cipro/Levaquin.    I have discussed with the patient the results of testing, differential diagnosis, and treatment plan. They expressed clear understanding of these instructions and agrees to the plan provided.    Disposition and Plan     Clinical Impression:  1. Angioedema, initial encounter    2. Citrobacter infection    3. Cystitis         Disposition:  Discharge  4/18/2025 12:02 pm    Follow-up:  Melba Barbour MD  [address] [facility] [address]  [phone]    Schedule an appointment as soon as possible for a visit in 3 day(s)          Medications Prescribed:  Discharge  Medication List as of 4/18/2025 12:28 PM        START taking these medications    Details   levoFLOXacin 250 MG Oral Tab Take 1 tablet (250 mg total) by mouth daily for 10 days., Normal, Disp-10 tablet, R-0

## 2025-04-18 NOTE — ED INITIAL ASSESSMENT (HPI)
Pt started on Macrobid on Wednesday for UTI. This morning staff noticed some rash and angioedema. Pt has no complaints herself at this time. 25mg of benadryl given by staff.

## 2025-04-30 ENCOUNTER — IMAGING SERVICES (OUTPATIENT)
Dept: GENERAL RADIOLOGY | Age: 89
End: 2025-04-30
Attending: STUDENT IN AN ORGANIZED HEALTH CARE EDUCATION/TRAINING PROGRAM

## 2025-04-30 ENCOUNTER — APPOINTMENT (OUTPATIENT)
Dept: ORTHOPEDICS | Age: 89
End: 2025-04-30

## 2025-04-30 DIAGNOSIS — S82.842D CLOSED BIMALLEOLAR FRACTURE OF LEFT ANKLE WITH ROUTINE HEALING, SUBSEQUENT ENCOUNTER: ICD-10-CM

## 2025-04-30 DIAGNOSIS — S82.842D CLOSED BIMALLEOLAR FRACTURE OF LEFT ANKLE WITH ROUTINE HEALING, SUBSEQUENT ENCOUNTER: Primary | ICD-10-CM

## 2025-04-30 PROCEDURE — 73610 X-RAY EXAM OF ANKLE: CPT | Performed by: STUDENT IN AN ORGANIZED HEALTH CARE EDUCATION/TRAINING PROGRAM

## 2025-05-07 ENCOUNTER — OFFICE VISIT (OUTPATIENT)
Dept: SURGERY | Facility: CLINIC | Age: OVER 89
End: 2025-05-07

## 2025-05-07 DIAGNOSIS — N39.0 RECURRENT UTI: Primary | ICD-10-CM

## 2025-05-07 DIAGNOSIS — N32.89 BLADDER WALL THICKENING: ICD-10-CM

## 2025-05-07 PROCEDURE — 99204 OFFICE O/P NEW MOD 45 MIN: CPT | Performed by: UROLOGY

## 2025-05-07 RX ORDER — EPINEPHRINE 0.3 MG/.3ML
0.3 INJECTION SUBCUTANEOUS AS NEEDED
COMMUNITY
Start: 2025-04-18

## 2025-05-07 RX ORDER — DIPHENHYDRAMINE HCL 25 MG
25 CAPSULE ORAL EVERY 6 HOURS PRN
COMMUNITY

## 2025-05-07 RX ORDER — DONEPEZIL HYDROCHLORIDE 5 MG/1
5 TABLET, FILM COATED ORAL NIGHTLY
COMMUNITY

## 2025-05-07 RX ORDER — OMEPRAZOLE 20 MG/1
20 CAPSULE, DELAYED RELEASE ORAL
COMMUNITY

## 2025-05-07 RX ORDER — SENNA AND DOCUSATE SODIUM 50; 8.6 MG/1; MG/1
1 TABLET, FILM COATED ORAL DAILY
COMMUNITY

## 2025-05-07 NOTE — H&P
HPI:     Valeria Ramey is a 89 year old female with a PMH of anxiety, HL, skin ca.  She presents as a consult with:  1. recurrent UTIs v positive cultures  - UCx 11/27/24, 3/27/24, 10/12/20: E coli R to amp  2. Bladder wall thickening on CT scan  3. AMH    PCP - [facility]    She feels well. Appetite and energy are OK.      [place]. Fell and broke hip Feb 2024 and has been WC-confined and in diaper since then. She is a full-lift. She has been getting confused recently and with smelly urine/diaper, prompting checking UA, UCx which have been positive but has not had other UTI symptoms.     Voids and defecates into diapers due to poor mobility but can tell when she needs to void and defecate.    Frequency: q 1-2 h  Urgency: none  Vaginal dryness/irritation: none  Diarrhea/constipation: none  Hip/back pain: none    Drinks ~ 40 oz water, 12 coffee with light yellow urine.    Gross hematuria: none  Tobacco hx: none  Kidney stone hx: none  Fam h/o  malignancy: none    Unable to void today.     CT AP 11/27/24: BWT, no other  abnormalities    We reviewed cystitis tx and prevention strategies at today's visit and I provided educational materials for this. I encouraged the patient to drink at least 40-60 oz water per day or enough to keep urine clear. I also reviewed dietary irritants and provided educational materials for this. We also discussed trying prn AZO for pain relief with plenty of water.    Discussed recurrent UTIs is likely multifactorial and related to poor mobility/inability to ambulate, defecating and urinating in depends, advanced age. Recommend proper hygiene and to encourage ambulation if possible.  We discussed that she would be unlikely to benefit from any type of surgical intervention but would be reasonable to check a cystoscopy to ensure there are no significant abnormalities.    Discussed the option to try a probiotic for recurrent positive urine cultures and she would like to do this.    We  discussed that it will be difficult to obtain a clean specimen for her given that she is wheelchair confined and that she may have contaminated urine checks in the future.  We discussed that there is potential harm and routinely prescribing antibiotics in the absence of UTI symptoms.    She will increase water intake for UTI prevention, starting probiotics for recurrent positive urine cultures. RTC for office cysto.    HISTORY:  Past Medical History[1]   Past Surgical History[2]   Family History[3]   Social History: Short Social Hx on File[4]     Medications (Active prior to today's visit):  Current Medications[5]    Allergies:  Allergies[6]      ROS:     A comprehensive 10 point review of systems was completed.  Pertinent positives and negatives noted in the the HPI.    PHYSICAL EXAM:     GENERAL APPEARANCE: well, developed, well nourished, in no acute distress  NEUROLOGIC: nonfocal, alert and oriented  HEAD: normocephalic, atraumatic  EYES: sclera non-icteric  EARS: hearing intact  ORAL CAVITY: mucosa moist  NECK/THYROID: no obvious goiter or masses  LUNGS: nonlabored breathing  ABDOMEN: soft, no obvious masses or tenderness  SKIN: no obvious rashes    : as noted above     ASSESSMENT/PLAN:   Diagnoses and all orders for this visit:    Recurrent UTI    Bladder wall thickening      - as noted above.    Thanks again for this consult.    Uriah Banks MD, FACS  Urologist  [facility]  Office: [phone]              [1]   Past Medical History:   Anxiety state    Cancer (HCC)    skin cancer on lip 2019    Cataract    Depression    Glaucoma    High cholesterol    Osteoporosis    Polyarthritis   [2]   Past Surgical History:  Procedure Laterality Date    Appendectomy      Cataract            x 8    Other surgical history      s/p adrenal adenoma    Skin surgery  2019    MMS of SCC to Left Upper Cutaneous Lip by MM    Tonsillectomy     [3]   Family History  Problem Relation Age of Onset    Cancer  Father 46        Hodgkin's Lymphoma    Other (Other) Mother         osteoporosis   [4]   Social History  Socioeconomic History    Marital status:     Number of children: 8   Occupational History    Occupation: Homeworker   Tobacco Use    Smoking status: Never    Smokeless tobacco: Never   Vaping Use    Vaping status: Never Used   Substance and Sexual Activity    Alcohol use: Yes     Comment: occ    Drug use: No     Social Drivers of Health     Food Insecurity: Patient Declined (2/4/2025)    Received from [facility]    Food Insecurity     Within the past 12 months, you worried that your food would run out before you got money to buy more.  : Patient declined     Within the past 12 months, the food you bought just didn't last and you didn't have money to get more. : Patient declined   Transportation Needs: Patient Declined (2/4/2025)    Received from [facility]    Transportation Needs     In the past 12 months, has lack of reliable transportation kept you from medical appointments, meetings, work or from getting things needed for daily living? : Patient declined   Housing Stability: Low Risk  (11/27/2024)    Housing Stability     Housing Instability: No   [5]   Current Outpatient Medications   Medication Sig Dispense Refill    EPINEPHrine 0.3 MG/0.3ML Injection Solution Auto-injector Inject 0.3 mL (1 each total) as directed as needed (PRN).      senna-docusate 8.6-50 MG Oral Tab Take 1 tablet by mouth daily.      D-Mannose 500 MG Oral Cap Take 500 mg by mouth in the morning and 500 mg before bedtime.      omeprazole 20 MG Oral Capsule Delayed Release Take 1 capsule (20 mg total) by mouth every morning before breakfast.      carbamide peroxide 6.5 % Otic Solution Place 5 drops into both ears in the morning and 5 drops before bedtime.      diphenhydrAMINE 25 MG Oral Cap Take 1 capsule (25 mg total) by mouth every 6 (six) hours as needed for Itching.      guaiFENesin 100 MG/5 ML Oral Take  10 mL (200 mg total) by mouth every 6 (six) hours as needed (needed for cough).      acetaminophen 500 MG Oral Tab Take 2 tablets (1,000 mg total) by mouth 3 (three) times daily as needed for Pain.      aspirin 325 MG Oral Tab Take 1 tablet (325 mg total) by mouth daily.      baclofen 10 MG Oral Tab Take 1 tablet (10 mg total) by mouth every 12 (twelve) hours as needed (spasms).      dorzolamide-timolol 2-0.5 % Ophthalmic Solution Place 1 drop into both eyes 2 (two) times daily.      ferrous sulfate 325 (65 FE) MG Oral Tab EC Take 1 tablet (325 mg total) by mouth once daily.      ondansetron (ZOFRAN) 4 mg tablet Take 1 tablet (4 mg total) by mouth every 6 (six) hours as needed for Nausea.      multivitamin Oral Tab Take 1 tablet by mouth daily.      amitriptyline 50 MG Oral Tab Take 1 tablet (50 mg total) by mouth nightly.      polyethylene glycol, PEG 3350, 17 g Oral Powd Pack Take 17 g by mouth daily. 14 each 0    HYDROcodone-acetaminophen 5-325 MG Oral Tab Take 1 tablet by mouth every 4 (four) hours as needed. 24 tablet 0    CRANBERRY EXTRACT OR Take 1 capsule by mouth 2 (two) times daily.      atorvastatin 10 MG Oral Tab Take 1 tablet (10 mg total) by mouth daily.      Loratadine 10 MG Oral Cap Take 10 mg by mouth daily as needed.      melatonin 1 MG Oral Tab Take 1.5 tablets (1.5 mg total) by mouth daily.      midodrine 5 MG Oral Tab Take 1 tablet (5 mg total) by mouth daily as needed (If systolic Blood pressure less than 120).      citalopram 20 MG Oral Tab Take 1 tablet (20 mg total) by mouth daily.      latanoprost 0.005 % Ophthalmic Solution Place 1 drop into both eyes nightly.      Calcium Carbonate-Vitamin D (CALCIUM 600-D OR) Take 1 capsule by mouth once daily.      donepezil 5 MG Oral Tab Take 1 tablet (5 mg total) by mouth nightly.     [6]   Allergies  Allergen Reactions    Macrodantin [Nitrofurantoin] ANGIOEDEMA    Other SWELLING     Catgut sutures    Lidocaine RASH     Reports allergy to lidocaine  patch

## 2025-10-29 ENCOUNTER — APPOINTMENT (OUTPATIENT)
Dept: ORTHOPEDICS | Age: 89
End: 2025-10-29

## (undated) DEVICE — DRAPE 2 INCS FILM ANTIMICROBIAL 23X17IN SURG IOBAN STRL

## (undated) DEVICE — 2% CHLORHEXIDINE SKIN PREP ORANGE 26ML

## (undated) DEVICE — HANDPIECE SCT MDVC FLX-CLR HI CAPACITY FLXB CLR STRL LF DISP

## (undated) DEVICE — SOLUTION IRR 0.9% NA CL 1000 ML PLASTIC POUR BTL ISOTONIC

## (undated) DEVICE — PADDING CAST L4 YD X W6 IN MICROPLEAT HI ABS SPECIALIST

## (undated) DEVICE — SYRINGE 30 ML CONC TIP GRAD NONPYROGENIC DEHP FREE LOK MED

## (undated) DEVICE — GLOVE SURG 7 PROTEXIS PI CLASSIC PWDR FREE BEAD CUFF PLISPRN

## (undated) DEVICE — SHEET,DRAPE,40X58,STERILE: Brand: MEDLINE

## (undated) DEVICE — C-ARMOR C-ARM EQUIPMENT COVERS CLEAR STERILE UNIVERSAL FIT 12 PER CASE: Brand: C-ARMOR

## (undated) DEVICE — DRAPE EXPAND CLPSBL C ARM FLRSCP EQUIPMENT C-ARMOR STRL

## (undated) DEVICE — SUTURE ETHILON MTPS 3-0 PS1 18IN MONO NABSB BLK 1663H

## (undated) DEVICE — BANDAGE ESMARK CMPR L12 FT X W4 IN ELASTIC BLUE

## (undated) DEVICE — SPONGE GAUZE L4 IN X W4 IN 16 PLY WOVEN FOLD EDGE CTN

## (undated) DEVICE — ELECTRODE PT RTN L9 FT VALLEYLAB REM POLYHESIVE ACRYLIC FOAM

## (undated) DEVICE — WRAP CMPR 5YDX6IN COBAN POR SLFADH ELASTIC LTWT HAND TEAR LF

## (undated) DEVICE — ANTIBACTERIAL UNDYED BRAIDED (POLYGLACTIN 910), SYNTHETIC ABSORBABLE SUTURE: Brand: COATED VICRYL

## (undated) DEVICE — SYRINGE BULB 50/CS 48/PLT: Brand: MEDEGEN MEDICAL PRODUCTS, LLC

## (undated) DEVICE — NEEDLE SPNL 18GA L3.5IN PNK QNCKE STYL DISP

## (undated) DEVICE — GLOVE SURG 7.5 PROTEXIS LF CRM PF SMTH BEAD CUFF STRL

## (undated) DEVICE — GOWN SURG XL L3 NONREINFORCE SET IN SLV STRL LF DISP BLUE

## (undated) DEVICE — SYRINGE MED 30ML STD CLR PLAS LL TIP N CTRL

## (undated) DEVICE — SOLUTION PREP 70% ISO ALC 4OZ LF

## (undated) DEVICE — DRAPE .75 SHT FNFLD 76X52IN SURG CNVRT STRL LF DISP TIBURON

## (undated) DEVICE — CUFF TRNQT 30X4IN ATS CYL 2 PORT BLDR STRL LF DISP

## (undated) DEVICE — SUTURE MONOCRYL 3-0 PS-2 L18 IN MONO UNDYED ABS

## (undated) DEVICE — PIN SUR 3.2X508MM THRD FOR STD COMP

## (undated) DEVICE — CONTAINER,SPECIMEN,PNEU TUBE,4OZ,OR STRL: Brand: MEDLINE

## (undated) DEVICE — C-ARM: Brand: UNBRANDED

## (undated) DEVICE — Device

## (undated) DEVICE — GLOVE SURG 8 PROTEXIS PI PWDR FREE SMTH BEAD CUFF INTLK

## (undated) DEVICE — DRAPE SURG ABS REINFORCE FENESTRATE ARMBRD CVR CIRC ELASTIC

## (undated) DEVICE — DRAPE SURG U STRIP IMPRV ADH SPLIT L72 IN X W60 IN TIBURON

## (undated) DEVICE — HIP PINNING CDS: Brand: MEDLINE INDUSTRIES, INC.

## (undated) DEVICE — STERILE SYNTHETIC POLYISOPRENE POWDER-FREE SURGICAL GLOVES WITH HYDROGEL COATING, SMOOTH FINISH, STRAIGHT FINGER: Brand: PROTEXIS

## (undated) DEVICE — SOLUTION IRRIG 1000ML 0.9% NACL USP BTL

## (undated) DEVICE — SLEEVE COMPR MD KNEE LEN SGL USE KENDALL SCD

## (undated) DEVICE — GLOVE SURG 7.5 PROTEXIS BLUE PI PWDR FREE SMTH BEAD CUFF INTLK

## (undated) DEVICE — BANDAGE CMPR L5 YD X W4 IN SELF ADH TAN

## (undated) DEVICE — DRESSING PETRO 8X1IN NADH OCL BCTRST LOWPRFL XEROFORM 3% BI

## (undated) DEVICE — GOWN SURG LG FABRIC ASTOUND BLUE LVL 3 NONREINFORCE SET IN

## (undated) DEVICE — PIN FIX 3X305MM TEMP THRD

## (undated) DEVICE — CALIBRATED DRILL 4.3 MM - CM: Brand: ZIMMER® NATURAL NAIL™ SYSTEM

## (undated) DEVICE — PAD ABD L9 IN X W5 IN ABS NONWOVEN HDRPHB BACK SEAL EDGE

## (undated) DEVICE — SPONGE GAUZE L6.75 IN X W6 IN ABS CTN

## (undated) DEVICE — SUT COAT VCRL 0 27IN CP-1 ABSRB UD 36MM 1/2

## (undated) DEVICE — BIT DRILL L125 MM OD2.7 MM ANTHEM ANKLE AO QCK CNCT

## (undated) DEVICE — COVER LT HNDL RIG FOR SUR CAM DISP

## (undated) DEVICE — POUCH INST 11X7IN 2 ADH STRIP 2 CMPRT STRL STRDRP PLASTIC

## (undated) DEVICE — SUTURE COAT VICRYL 2-0 CT-1 L36 IN BRAID COAT UNDYED ABS

## (undated) DEVICE — WIRE FX OD1.6 MM L150 MM ANTHEM KIRSCHNER TROCAR TIP

## (undated) DEVICE — BANDAGE VELCLOSE CMPR L5 YD X W6 IN HOOK SELF CLSR KNIT

## (undated) DEVICE — GLOVE SUR 7 SENSICARE PI PIP CRM PWD F

## (undated) DEVICE — DRAPE FTSWTCH CVR X RAY TUBE FLAT PNL OEC MBL 33323 31CM C

## (undated) DEVICE — PROXIMATE SKIN STAPLERS (35 WIDE) CONTAINS 35 STAINLESS STEEL STAPLES (FIXED HEAD): Brand: PROXIMATE

## (undated) NOTE — IP AVS SNAPSHOT
Patient Demographics     Address  [id number] [place] [address] Phone  [phone] (Home) *Preferred*  [phone] (Mobile) E-mail Address  [email or web address]      Patient Contacts     Name Relation Home Work Mobile    Sonia De Guzman Daughter [phone]      Michael Ramey Son   [phone]    Rena Esquivel Daughter [phone]    Xena Galvez Daughter   [phone]      Allergies as of 4/4/2024  Review status set to In Progress on 4/3/2024       Noted Reaction Type Reactions    Other 03/27/2024    SWELLING    Catgut sutures    Lidocaine 03/29/2024    RASH    Reports allergy to lidocaine patch       Code Status Information     Code Status    DNAR/Selective Treatment        Patient Instructions         Norco prn pain   lovenox subcutaneous for dvt ppx X 2 weeks then start Aspirin  Continue PT/OT, fall precautions.    Hip Fracture Discharge Instructions    Activity:  Weight bearing as tolerated (WBAT) ? you may put as much of your weight on your leg as you can tolerate. This means you may use a walker, crutches, cane- or no device at all to get around.      Bathing  No tub baths, pools, or saunas until cleared by surgeon (about 4-6 weeks because it takes that long for the incision(s) on the skin to heal and be a barrier to prevent infection.)    When allowed to shower:  AQUACEL dressing is waterproof and does not require being covered before showering.  Pat dressing(s) and surrounding skin dry after shower.   There may be one incision or a few that have a dressing.                                      AQUACEL           MEDIPORE/COVERLET           DRY STERILE GAUZE                                                                                                                         MEDIPORE/COVERLET dressing and dry sterile gauze are NOT waterproof and REQUIRE being covered with a waterproof barrier to keep the dressing and incision dry.  SULTANA WRAP, YISEL WRAP, PRESS N SEAL WORK REALLY  WELL BUT ANY PLASTIC WRAP WILL DO.  Do not wash incision.   Remove entire wrapping and old dressing (if Medipore/coverlet or gauze) after showering. Pat dry with a CLEAN TOWEL if necessary and cover incision with new Medipore/coverlet or gauze.    Incision care/Dressing changes  Wash hands before and after dressing changes.  There may be one or a few dressings on the hip/leg    FOR MEDIPORE/COVERLET DRESSINGS:  Change dressing daily using Medipore/coverlet once Aquacel (waterproof) dressing is removed (which is about 7 days after surgery). Patient should be standing or lying flat so dressing goes on smoothly.  (This dressing needed for hip patients because of location of incision-don’t want contamination from bathroom use)  FOR DRY STERILE GAUZE DRESSINGS:   Change dressing daily using dry sterile gauze and paper tape.  Watch ALL incision for any redness, drainage, increased warmth or opening of the incision.   Call surgeon if you notice any of these.  No lotions or ointments on or near incision(s).                             DRY STERILE GAUZE                         MEDIPORE /COVERLET    Continue dressing changes until your first visit with your surgeon’s office.  There could be a small amount of redness around the staples or incision; this is normal.  Watch for increased redness, warmth, any odor, increased drainage or opening of the incision. A little clear yellow or blood tinged drainage is normal up to 2 weeks after surgery but it should be less every day until it stops.  Call physician if you notice any concerning changes.  Sutures/staples will be removed at first office visit (10 days- 3 weeks).       Driving  Do not drive until cleared by surgeon. Possibly six weeks after surgery. Discuss this at follow-up office visit.   Not allowed while taking narcotic pain medication or muscle relaxants.    Sex  Usually allowed after six weeks - check with surgeon at your office visit.    Return to work  Usually allowed  after six weeks. Discuss specific work activities with your surgeon.    Restrictions  Follow instructions provided by physical therapy    No smoking  Avoid smoking. It is known to cause breathing problems and can decrease the rate of healing.                      Medication  Anticoagulants = blood thinners (Xarelto, Eliquis, LOVENOX, Coumadin or Aspirin)  Pill or shot form depending on what your physician orders.   IF placed on Coumadin, you may also need lab work done for monitoring.  You will bleed easier and bruise easier while on these medications.   Usually you will be on a blood thinner for about 4-5 weeks after surgery.  Contact your physician if you have signs of bruising, nose bleeds or blood in your urine. Use electric razors and soft toothbrushes only.  Do not take NSAIDS (non-steroidal anti-inflammatory medications) while taking blood thinners unless ordered by your surgeon.  Review anticoagulant education information sheet provided.    Discomfort  Surgical discomfort is normal for one to two months.  Have realistic goals and keep a positive outlook.  Keep pain manageable; pain should not disrupt your sleep or activities like getting out of bed or walking.  You may need pain medication regularly (every 4-6 hours) the first 2 weeks and then begin to decrease how often you are taking it.  Take pain medication as prescribed with food, especially before therapy, allowing 30-60 minutes to take effect.  Do not drink alcohol while on pain medication.  As you have less discomfort, decrease the amount of pain medication you take. Use plain Tylenol (acetaminophen) for less severe pain.  Some pain medications have Tylenol (acetaminophen) in them such as Norco and Percocet. Do NOT take Tylenol (acetaminophen) within 4 hours of a dose of these medications.  Apply ice or cold therapy to surgical site for 20 minutes at least four times a day, especially after therapy. Be sure there is a thin cloth barrier between skin  and ice or cold therapy.  Change position at least every 45 minutes while awake to avoid stiffness or increased discomfort.  Deep breathing and relaxation techniques and distractions can help!  If you focus on something else, you do not experience the pain the same. Take advantage of everything available to your to help control you discomfort.  Contact physician if discomfort does not respond to pain medication.    Body changes  Constipation is common with the use of narcotics.  Eat fiber rich foods and drink plenty of fluids, at least 4-6 glasses of water a day, unless restricted.  To prevent constipation, use stool softeners such as Colace and laxatives such as Miralax, Senakot or Milk of Magnesia while taking laxatives.   An enema or suppository may be needed if above measures do not work.    Prevention of infection and promotion of healing  Good hand washing is important. Everyone should wash their hands or use hand  as soon as they walk in your house-whether they live there or are visiting.  Keep bed linen/clothing freshly laundered.  Do not allow others or pets to touch your incision.  Avoid people that have colds or the flu.  Your surgeon may recommend that you take antibiotics before you undergo any dental or other invasive surgical procedures after your hip fracture surgery. Speak with your surgeon about this at your post-op office visit.  Continue using incentive spirometry because narcotics make you sleepy so you may not take good deep breaths. We do not want you to get pneumonia.     Post op Office visits  Schedule 10 days to 3 weeks after surgery WITH SURGEON’s office.  Additional visits may need to be scheduled. Your physician will discuss this at first post-op office visit.  Schedule outpatient physical therapy per your surgeon’s orders.  Schedule one week follow up after surgery WITH PRIMARY CARE PHYSICIAN; review your medications over last 6 months.  Your body gets stressed by surgery and  that stress can affect all your other health issues (such as high blood pressure, diabetes, CHF, afib, and asthma just to name a few).  It is much better to catch developing problems and prevent them from becoming larger ones.  MARILIN HOSE - IF ordered by your surgeon, wear these during the day and off at night.  Surgeon will tell you when you don’t need them anymore.    Notify your surgeon if you notice any of the following signs  Separation of incision line.  Increased redness, swelling, or warmth of skin around incision.  Increased or foul smelling drainage from incision  Red streaks on skin near incision.  Temperature >100.4F.  Increased pain at incision not relieved by pain medication.    Signs of Possible Hip Dislocation IF Total Hip Replacement or Madhu-arthroplasty Done  Increased severe leg or groin pain  Turning in or out of surgical leg that is new  Increased numbness, tingling to leg  Inability to walk or put weight on your surgical leg  Signs of blood clot  Pain, excessive tenderness, redness, or swelling in leg or calf (other than incision site).  Call physician and await their directions.    Go directly to the ER or CALL 911 if you:  become short of breath  have chest pain  cough up blood  have unexplained anxiety with breathing     Traveling and Handicapped parking  Check with you surgeon when you are allowed to travel so you don’t set yourself up for greater chance of complications.  If traveling by car, get out to stretch every 2 hours.  This helps prevent stiffness.  You may need to do this any time you travel for the first year after surgery.  If traveling by plane, BEFORE you get into a security line, let them know that you had your hip replaced, as you will most likely set off the metal detector. The doctors no longer provide an identification card for this as they are easily copied. ALSO request a wheelchair the first year to board and get off a plane…this aids in priority seating and you should  sit on the aisle or at the bulkhead where you can easily stretch your legs and get up to walk up and down the aisles…this helps prevent blood clots and stiffness.  TEMPORARY HANDICAP PARKING APPLICATION  (good for 3-6 months)  - At Surgeon or PCP visit, request they fill out their part of the form. You fill our your portion, then go to [employer] of [employer]. Some [facility] offices provide the same service. ([place] and [place] have this service; if you live in another [facility], you may check with them as well). You need space to open car doors to position yourself properly with walker to get in and out of your car safely; some parking spaces are  practically on top of each other and do not give you enough room.     SPECIAL  INSTRUCTIONS:   Spanda- hip replacement(single layer compression sleeve):     All patients should wear the compression sleeve until first follow up visit to surgeon’s office (about 2-3 weeks) and then check with surgeon if need to continue use.  Take off to shower. Best to keep on as much as possible, even at night, except when washing out.  Wash with mild soap and water; DRIP dry overnight- put back on before getting up for the day.  Use one long tube on the calf.   It should end up just below the back of the knee and the other end on the ball of the foot to expose the toes.   Makes sure it is NOT bunched up anywhere.  IF the Spanda- feels TOO tight, then REMOVE it and call your surgeon to let them know.           Follow-up Information     iJmmy Brown MD. Schedule an appointment as soon as possible for a visit in 1 week(s).    Specialty: Internal Medicine  Contact information:  [address]  [address]  [facility] [id number] [phone]             Ruben Marley MD. Schedule an appointment as soon as possible for a visit in 2 week(s).    Specialty: SURGERY, ORTHOPEDIC  Contact information:  [address]  [address]  [facility] [id number] [phone]                         Your Home Meds List      TAKE these medications       Instructions Authorizing Provider Morning Afternoon Evening As Needed   aspirin 81 MG Tbec  Next dose due: START AFTER LOVENOX COMPLETED      Take 1 tablet (81 mg total) by mouth 2 (two) times daily. Start after lovenox regiment has been completed. Take for 4 weeks.   Elvin Richards         atorvastatin 10 MG Tabs  Commonly known as: Lipitor  Next dose due: Tomorrow am       Take 1 tablet (10 mg total) by mouth daily.          CALCIUM 600-D OR  Next dose due: Tonight at 9 pm       Take by mouth 2 (two) times daily.          citalopram 20 MG Tabs  Commonly known as: CeleXA  Next dose due: Tomorrow am       Take 1 tablet (20 mg total) by mouth daily.          CRANBERRY EXTRACT OR  Next dose due: Tonight at 9 pm       Take 1 capsule by mouth 2 (two) times daily.          donepezil 5 MG Tabs  Commonly known as: Aricept  Next dose due: Tonight at 9pm       Take 1 tablet (5 mg total) by mouth at bedtime.          dorzolamide-timolol 2-0.5 % Soln  Commonly known as: Cosopt  Next dose due: Tonight at 9pm      Place 1 drop into both eyes 2 (two) times daily.   Brettre Matthews         enoxaparin 40 MG/0.4ML Sosy  Commonly known as: Lovenox  Next dose due: Tomorrow am       Inject 0.4 mL (40 mg total) into the skin daily. Take for 2 weeks after surgery.   Shaunna Lozano         HYDROcodone-acetaminophen 5-325 MG Tabs  Commonly known as: Norco      Take 1 tablet by mouth every 4 (four) hours as needed.   Shawna Vásquezalil         latanoprost 0.005 % Soln  Commonly known as: Xalatan  Next dose due: Tomorrow am      Place 1 drop into both eyes daily.          Loratadine 10 MG Caps      Take 10 mg by mouth daily as needed.          melatonin 1 MG Tabs  Next dose due: Tonight at 9 pm       Take 1.5 tablets (1.5 mg total) by mouth daily.          midodrine 5 MG Tabs  Commonly known as: ProAmatine      Take 1 tablet (5 mg total) by mouth daily as needed (If systolic  Blood pressure less than 120).          Polyethylene Glycol 3350 17 g Pack  Commonly known as: MIRALAX  Next dose due: Tomorrow am       Take 17 g by mouth daily.   Shaunna Lozano         Sennosides 17.2 MG Tabs      Take 1 tablet (17.2 mg total) by mouth nightly as needed (constipation).   Shaunna Lozano               Where to Get Your Medications      These medications were sent to [facility] [address] [place] [address] [phone], [phone]  [address] [place] [address]    Phone: [phone]   aspirin 81 MG Tbec  dorzolamide-timolol 2-0.5 % Soln     Please  your prescriptions at the location directed by your doctor or nurse    Bring a paper prescription for each of these medications  enoxaparin 40 MG/0.4ML Sosy  HYDROcodone-acetaminophen 5-325 MG Tabs  Polyethylene Glycol 3350 17 g Pack  Sennosides 17.2 MG Tabs           [id number] - MAR ACTION REPORT  (last 48 hrs)    ** SITE UNKNOWN **     Order ID Medication Name Action Time Action Reason Comments    [id number] HYDROcodone-acetaminophen (Norco) 5-325 MG per tab 1 tablet (Or Linked Group #1) 04/02/24 1258 Given      [id number] HYDROcodone-acetaminophen (Norco) 5-325 MG per tab 2 tablet (Or Linked Group #1) 04/03/24 1655 Given      [id number] QUEtiapine (SEROquel) partial tab 12.5 mg 04/03/24 2113 Given      [id number] acetaminophen (Tylenol) tab 650 mg (Or Linked Group #1) 04/02/24 2325 Given      [id number] amitriptyline (Elavil) tab 25 mg 04/02/24 2112 Given      [id number] amitriptyline (Elavil) tab 25 mg 04/03/24 2114 Given      [id number] atorvastatin (Lipitor) tab 10 mg 04/02/24 1252 Given      [id number] atorvastatin (Lipitor) tab 10 mg 04/03/24 1815 Given      [id number] diphenhydrAMINE-zinc (Benadryl-Zinc) 2-0.1 % cream 1 Application 04/02/24 1744 Given      [id number] diphenhydrAMINE-zinc (Benadryl-Zinc) 2-0.1 % cream 1 Application 04/02/24 2328 Given      [id number] diphenhydrAMINE-zinc (Benadryl-Zinc) 2-0.1 % cream 1  Application 04/03/24 0914 Given      [id number] diphenhydrAMINE-zinc (Benadryl-Zinc) 2-0.1 % cream 1 Application 04/03/24 2124 Given      [id number] diphenhydrAMINE-zinc (Benadryl-Zinc) 2-0.1 % cream 1 Application 04/04/24 0608 Given      [id number] donepezil (Aricept) tab 5 mg 04/02/24 2112 Given      [id number] donepezil (Aricept) tab 5 mg 04/03/24 2114 Given      [id number] dorzolamide-timolol (Cosopt) 2-0.5 % ophthalmic solution 1 drop 04/02/24 2112 Given      [id number] dorzolamide-timolol (Cosopt) 2-0.5 % ophthalmic solution 1 drop 04/03/24 0914 Given      [id number] dorzolamide-timolol (Cosopt) 2-0.5 % ophthalmic solution 1 drop 04/03/24 2239 Given      [id number] escitalopram (Lexapro) tab 10 mg 04/02/24 1253 Given      [id number] escitalopram (Lexapro) tab 10 mg 04/03/24 1814 Given      [id number] iopamidol 76% (ISOVUE-370) injection for power injector 04/03/24 1149 Given      [id number] latanoprost (Xalatan) 0.005 % ophthalmic solution 1 drop 04/02/24 2112 Given      [id number] latanoprost (Xalatan) 0.005 % ophthalmic solution 1 drop 04/03/24 2114 Given      [id number] melatonin cap/tab 10 mg 04/02/24 2111 Given      [id number] melatonin cap/tab 10 mg 04/03/24 2113 Given      [id number] multivitamin (Tab-A-Carlos/Beta Carotene) tab 1 tablet 04/02/24 1252 Given      [id number] polyethylene glycol (PEG 3350) (Miralax) 17 g oral packet 17 g 04/02/24 1253 Given      [id number] polyethylene glycol (PEG 3350) (Miralax) 17 g oral packet 17 g 04/03/24 1815 Given      [id number] sennosides (Senokot) tab 17.2 mg 04/03/24 2113 Given              Recent Vital Signs    Flowsheet Row Most Recent Value   /56 Filed at 04/04/2024 0839   Pulse 98 Filed at 04/04/2024 0839   Resp 15 Filed at 04/04/2024 0839   Temp 98.1 °F (36.7 °C) Filed at 04/04/2024 0839   SpO2 92 % Filed at 04/04/2024 0839      Patient's Most Recent Weight    Flowsheet Row Most Recent Value   Patient Weight 78.2 kg (172 lb 6.4 oz)      Lab Results Last 24 Hours    No  matching results found     Microbiology Results (All)     Procedure Component Value Units Date/Time    Blood Culture [[id number]] Collected: 03/28/24 0325    Order Status: Completed Lab Status: Final result Updated: 04/02/24 0900    Specimen: Blood,peripheral      Blood Culture Result No Growth 5 Days    Narrative:      Aerobic Bottle Volume - 1 mL    Comment -  Less than optimal blood volume collected, which may yield a false-negative result. Adequate volume is correlated with increased recovery rates. Lower volumes may adversely affect recovery and/or detection times. Clinical correlation is   recommended.    Blood Culture [[id number]] Collected: 03/28/24 0214    Order Status: Completed Lab Status: Final result Updated: 04/02/24 0400    Specimen: Blood,peripheral      Blood Culture Result No Growth 5 Days    Urine Culture, Routine [[id number]]  (Abnormal)  (Susceptibility) Collected: 03/27/24 1558    Order Status: Completed Lab Status: Final result Updated: 03/30/24 0831    Specimen: Urine, clean catch      Urine Culture >100,000 CFU/ML Escherichia coli    Susceptibility      Escherichia coli      Not Specified     Ampicillin 4  Sensitive     Cefazolin <=4  Sensitive     Ciprofloxacin <=0.25  Sensitive     Gentamicin <=1  Sensitive     Levofloxacin <=0.12  Sensitive     Meropenem <=0.25  Sensitive     Nitrofurantoin <=16  Sensitive     Piperacillin + Tazobactam <=4  Sensitive     Trimethoprim/Sulfa <=20  Sensitive                          Emergency MRSA Screen by PCR [[id number]]  (Normal) Collected: 03/28/24 0255    Order Status: Completed Lab Status: Final result Updated: 03/28/24 0409    Specimen: Other from Nares      MRSA Screen By PCR Negative         H&P - H&P Note      H&P signed by Wayne Seals MD at 3/27/2024  5:41 AM  Version 1 of 1    Author: Wayne Seals MD Service: — Author Type: Physician    Filed: 3/27/2024  5:41 AM Date of Service: 3/27/2024  4:35 AM Status: Signed    :  Wayne Seals MD (Physician)         [facility]     Valeria Ramey Patient Status:  Emergency    1934 MRN [id number]   Location [facility] EMERGENCY DEPARTMENT Attending Mary Vela DO   Hosp Day # 0 PCP JOSIAH VASQUEZ MD     Chief Complaint: Mechanical fall    History of Present Illness: Valeria Ramey is a 89 year old female with past medical history of hypertension, depression, polyarthritis who presents for mechanical fall.    History obtained from daughter at bedside.  Patient notes that she was in normal state of health today when she was attempting to move around at home and assisted living and got tangled around her feet.  Fell on the right side, and had injury to the hip.  Daughter notes that she has been having multiple falls when she was living at home as well.  Patient currently denies any other nausea, vomiting, headaches, vision changes, abdominal pain, chest pain    Past Medical History:History reviewed. No pertinent past medical history.     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 She has never used smokeless tobacco. She reports current alcohol use. She reports that she does not use drugs.    Family History:   Family History   Problem Relation Age of Onset    Cancer Father 46        Hodgkin's Lymphoma    Other (Other) Mother         osteoporosis       Allergies: No Known Allergies    Medications:    No current facility-administered medications on file prior to encounter.     Current Outpatient Medications on File Prior to Encounter   Medication Sig Dispense Refill    ketoconazole 2 % External Cream Apply to ears BID as needed. 60 g 2    hydrocortisone 2.5 %  External Cream Apply thin layer to AA of Lip 1-2 times daily x 1 week, hold, repeat PRN 30 g 0    hydrocortisone 2.5 % External Ointment Apply to AA BID for 2 weeks, then hold 2 weeks, repeat 30 g 2    clotrimazole-betamethasone 1-0.05 % External Cream Apply topically 2 (two) times daily.      Dorzolamide HCl-Timolol Mal 22.3-6.8 MG/ML Ophthalmic Solution 1 drop 2 (two) times daily.      latanoprost 0.005 % Ophthalmic Solution Place 1 drop into both eyes nightly.      aspirin 81 MG Oral Tab Take 81 mg by mouth daily.      Multiple Vitamin (MULTI-DAY OR) Take by mouth daily.      Omega-3 Fatty Acids (FISH OIL OR) Take by mouth daily.      TURMERIC OR Take by mouth 3 (three) times daily.      CRANBERRY CONCENTRATE OR Take by mouth daily.      Calcium Carbonate-Vitamin D (CALCIUM 600-D OR) Take by mouth 2 (two) times daily.         Review of Systems:   A comprehensive 14 point review of systems was completed.    Pertinent positives and negatives noted in the HPI.    Physical Exam:    /81   Pulse 72   Temp 97.5 °F (36.4 °C) (Oral)   Resp 10   Wt 150 lb (68 kg)   SpO2 99%   BMI 26.36 kg/m²   General: No acute distress. Alert and oriented x 3.  HEENT: Normocephalic atraumatic. Moist mucous membranes. EOM-I. PERRLA. Anicteric.  Neck: No lymphadenopathy. No JVD. No carotid bruits.  Respiratory: Clear to auscultation bilaterally. No wheezes. No rhonchi.  Cardiovascular: S1, S2. Regular rate and rhythm. No murmurs, rubs or gallops. Equal pulses.   Chest and Back: No tenderness or deformity.  Abdomen: Soft, nontender, nondistended.  Positive bowel sounds. No rebound, guarding or organomegaly.  Neurologic: No focal neurological deficits. CNII-XII grossly intact.  Musculoskeletal: Moves all extremities.  DP pulses intact bilaterally  Extremities: No edema or cyanosis.  Integument: No rashes or lesions.   Psychiatric: Appropriate mood and affect.    Diagnostic Data:      Labs:  Recent Labs   Lab 03/27/24  0212   WBC  9.4   HGB 12.1   MCV 97.2   .0       Recent Labs   Lab 24  0211   *   BUN 21   CREATSERUM 1.28*   CA 9.0   ALB 3.2*      K 4.2      CO2 28.0   ALKPHO 69   AST 12*   ALT 20   BILT 0.2   TP 6.6       CrCl cannot be calculated (Unknown ideal weight.).    No results for input(s): \"PTP\", \"INR\" in the last 168 hours.    No results for input(s): \"TROP\", \"CK\" in the last 168 hours.    Imaging: Imaging data reviewed in Epic.  No results found.      ASSESSMENT / PLAN:     # Right hip fracture   - Primary management per orthopedic surgery   - s/p femoral block in ER    - PT/OT/SW following OR   - Pain control, bowel regimen    # Dementia - Donepizil  # Depression - Elavil, lexapro  # Orthostatic hypotension - midodrine PRN      Code Status: Not on file    Plan of care discussed with patient, ED physician    Wayne Seals MD  3/27/2024                Supplementary Documentation:      MDM : Patient's ER labs, imaging reviewed.  A.m. labs ordered.  ER management discussed with ED physician, decision made for patient to be admitted to the hospital for further medical management.              Electronically signed by Wayne Seals MD on 3/27/2024  5:41 AM              Consults - MD Consult Notes      Consults signed by Silvino Barrera MD at 3/28/2024 11:30 AM     Author: Silvino Barrera MD Service: Critical Care Author Type: Physician    Filed: 3/28/2024 11:30 AM Date of Service: 3/28/2024  1:42 AM Status: Addendum    : Silvino Barrera MD (Physician)    Related Notes: Original Note by Yisel Cameron APRN (APN) filed at 3/28/2024  6:11 AM     Consult Orders    1. Consult Intensivist [[id number]] ordered by Ruben Marley MD at 24 [id number]             ICU  Critical Care APRN Progress Note    NAME: Valeria Ramey - ROOM:  PACU/ PACU Pool - MRN: [id number] - Age: 89 year old - :1934    History Of Present Illness:  Valeria Ramey is a 89 year old female with PMHx  significant for HTN, depression, polyarthritis who presented to the ED yesterday after a fall to the right hip. ED work up revealed hip fracture for which she was taken to the OR and is now POD #0 s/p right femur intramedullary nailing. Patient became hypotensive in PACU requiring initiation of norepinephrine and she was brought to the ICU for closer monitoring.    PMH:  Past Medical History:   Diagnosis Date    Anxiety state     Cancer (HCC)     skin cancer on lip 2019    Cataract     Depression     Glaucoma     High cholesterol     Osteoporosis     Polyarthritis        Social Hx:  Social History     Socioeconomic History    Marital status:     Number of children: 8   Occupational History    Occupation: Homeworker   Tobacco Use    Smoking status: Never    Smokeless tobacco: Never   Vaping Use    Vaping Use: Never used   Substance and Sexual Activity    Alcohol use: Yes     Comment: occ    Drug use: No     Social Determinants of Health     Food Insecurity: No Food Insecurity (3/27/2024)    Food Insecurity     Food Insecurity: Never true   Transportation Needs: No Transportation Needs (3/27/2024)    Transportation Needs     Lack of Transportation: No   Housing Stability: Low Risk  (3/27/2024)    Housing Stability     Housing Instability: No       Family Hx:  Family History   Problem Relation Age of Onset    Cancer Father 46        Hodgkin's Lymphoma    Other (Other) Mother         osteoporosis         Review of Systems:   A comprehensive 10 point review of systems was completed.  Pertinent positives and negatives noted in the HPI.    Current Facility-Administered Medications   Medication Dose Route Frequency    [MAR Hold] orphenadrine citrate (Norflex) 30 mg/mL injection 30 mg  30 mg Intravenous Q12H    [MAR Hold] lidocaine PF (Xylocaine-MPF) 1% injection  2 mL Injection PRN    [MAR Hold] ropivacaine (Naropin) 0.5% injection  30 mL Injection PRN    [MAR Hold] EPINEPHrine (Adrenalin) 1 MG/ML injection (Allergic  Reaction Kit) 1 mg  1 mg Injection PRN    [MAR Hold] sodium chloride 0.9% PF injection 10 mL  10 mL Injection PRN    lactated ringers infusion   Intravenous Continuous    [MAR Hold] dorzolamide-timolol (Cosopt) 2-0.5 % ophthalmic solution 1 drop  1 drop Both Eyes BID    [MAR Hold] latanoprost (Xalatan) 0.005 % ophthalmic solution 1 drop  1 drop Both Eyes Nightly    [MAR Hold] amitriptyline (Elavil) tab 25 mg  25 mg Oral Nightly    [MAR Hold] atorvastatin (Lipitor) tab 10 mg  10 mg Oral Nightly    [MAR Hold] donepezil (Aricept) tab 5 mg  5 mg Oral Nightly    [MAR Hold] escitalopram (Lexapro) tab 10 mg  10 mg Oral QPM    [MAR Hold] midodrine (ProAmatine) tab 5 mg  5 mg Oral TID PRN    [MAR Hold] melatonin tab 3 mg  3 mg Oral Nightly PRN    [MAR Hold] polyethylene glycol (PEG 3350) (Miralax) 17 g oral packet 17 g  17 g Oral Daily PRN    [MAR Hold] sennosides (Senokot) tab 17.2 mg  17.2 mg Oral Nightly PRN    [MAR Hold] bisacodyl (Dulcolax) 10 MG rectal suppository 10 mg  10 mg Rectal Daily PRN    [MAR Hold] ondansetron (Zofran) 4 MG/2ML injection 4 mg  4 mg Intravenous Q6H PRN    [MAR Hold] metoclopramide (Reglan) 5 mg/mL injection 5 mg  5 mg Intravenous Q8H PRN    [MAR Hold] benzonatate (Tessalon) cap 200 mg  200 mg Oral TID PRN    [MAR Hold] guaiFENesin (Robitussin) 100 MG/5 ML oral liquid 200 mg  200 mg Oral Q4H PRN    [MAR Hold] glycerin-hypromellose- (Artifical Tears) 0.2-0.2-1 % ophthalmic solution 1 drop  1 drop Both Eyes QID PRN    [MAR Hold] sodium chloride (Saline Mist) 0.65 % nasal solution 1 spray  1 spray Each Nare Q3H PRN    [MAR Hold] enoxaparin (Lovenox) 30 MG/0.3ML SUBQ injection 30 mg  30 mg Subcutaneous Daily    [MAR Hold] acetaminophen (Tylenol) tab 650 mg  650 mg Oral Q4H PRN    Or    [MAR Hold] HYDROcodone-acetaminophen (Norco) 5-325 MG per tab 1 tablet  1 tablet Oral Q4H PRN    Or    [MAR Hold] HYDROcodone-acetaminophen (Norco) 5-325 MG per tab 2 tablet  2 tablet Oral Q4H PRN    [MAR Hold]  HYDROmorphone (Dilaudid) 1 MG/ML injection 0.2 mg  0.2 mg Intravenous Q2H PRN    Or    [MAR Hold] HYDROmorphone (Dilaudid) 1 MG/ML injection 0.4 mg  0.4 mg Intravenous Q2H PRN    Or    [MAR Hold] HYDROmorphone (Dilaudid) 1 MG/ML injection 0.8 mg  0.8 mg Intravenous Q2H PRN    lactated ringers infusion   Intravenous Continuous    atropine 0.1 MG/ML injection 0.5 mg  0.5 mg Intravenous PRN    naloxone (Narcan) 0.4 MG/ML injection 0.08 mg  0.08 mg Intravenous PRN    fentaNYL (Sublimaze) 50 mcg/mL injection 25 mcg  25 mcg Intravenous Q5 Min PRN    Or    fentaNYL (Sublimaze) 50 mcg/mL injection 50 mcg  50 mcg Intravenous Q5 Min PRN    HYDROmorphone (Dilaudid) 1 MG/ML injection 0.2 mg  0.2 mg Intravenous Q5 Min PRN    Or    HYDROmorphone (Dilaudid) 1 MG/ML injection 0.4 mg  0.4 mg Intravenous Q5 Min PRN    Or    HYDROmorphone (Dilaudid) 1 MG/ML injection 0.6 mg  0.6 mg Intravenous Q5 Min PRN    ondansetron (Zofran) 4 MG/2ML injection 4 mg  4 mg Intravenous Q6H PRN    metoclopramide (Reglan) 5 mg/mL injection 5 mg  5 mg Intravenous Q8H PRN    meperidine (Demerol) 25 MG/ML injection 12.5 mg  12.5 mg Intravenous PRN    norepinephrine (Levophed) 4 mg/250mL infusion premix  0.5-8 mcg/min Intravenous Continuous       OBJECTIVE  Vitals:  BP (!) 82/50   Pulse 115   Temp 98.5 °F (36.9 °C)   Resp 24   Ht 152.4 cm (5')   Wt 150 lb (68 kg)   SpO2 98%   BMI 29.29 kg/m²                Physical Exam:    General Appearance: Alert, confused  Neck: No JVD, neck supple, no adenopathy, trachea midline, no carotid bruits  Lungs: Clear to auscultation bilaterally, respirations unlabored  Heart: Regular rate and rhythm, S1 and S2 normal, no murmur, rub or gallop  Abdomen: Soft, non-tender, bowel sounds active all four quadrants, no masses, no organomegaly  Extremities: Extremities normal, atraumatic, no cyanosis or edema, capillary refill <3 sec.    Pulses: 2+ and symmetric all extremities  Skin: Pale, R hip dressing clean dry and  intact    Data this admission:  XR HIP W OR WO PELVIS 1 VIEW, RIGHT (CPT=73501)    Result Date: 3/27/2024  CONCLUSION:  The patient is status post open reduction internal fixation of a right femur inter trochanteric fracture.  A intramedullary nail/right and compression screw noted.  Postsurgical changes are noted the soft tissues.   LOCATION:  [facility]   Dictated by (CST): Uriah Agosto MD on 3/27/2024 at 10:35 PM     Finalized by (CST): Uriah Agosto MD on 3/27/2024 at 10:36 PM       XR FLUOROSCOPY C-ARM TIME LESS THAN 1 HOUR (CPT=76000)    Result Date: 3/27/2024  CONCLUSION:  The patient is status post open reduction internal fixation of a right femoral inter trochanteric fracture.  Postsurgical changes are noted in the soft tissues.   LOCATION:  [facility]    Dictated by (CST): Uriah Agosto MD on 3/27/2024 at 9:48 PM     Finalized by (CST): Uriah Agosto MD on 3/27/2024 at 9:49 PM       CT SPINE CERVICAL (CPT=72125)    Result Date: 3/27/2024  CONCLUSION:  No evidence of acute fracture of the cervical spine.  There are moderate to severe multilevel degenerative changes present.  If there is persistent clinical concern then recommend follow-up imaging including MRI.  A preliminary report was provided by [employer].     LOCATION:  [facility]   Dictated by (CST): Matthew Urena MD on 3/27/2024 at 8:21 AM     Finalized by (CST): Matthew Urena MD on 3/27/2024 at 8:25 AM       XR CHEST AP PORTABLE  (CPT=71045)    Result Date: 3/27/2024  CONCLUSION:  1. Shallow inspiratory film without definite focal consolidation.  Limited due to technique.  Further evaluation can be obtained with CT of the chest as clinically indicated.  ED M.D. notified of these findings with preliminary radiology report from [facility] services.    LOCATION:  [facility]      Dictated by (CST): Eliana Chew MD on 3/27/2024 at 8:19 AM     Finalized by (CST): Eliana Chew MD on 3/27/2024 at 8:20 AM       XR HIP W OR WO PELVIS 2 OR 3 VIEWS, RIGHT  (GUS=08663)    Result Date: 3/27/2024  CONCLUSION:  1. Comminuted displaced right intratrochanteric fracture.  ED M.D. notified of these findings with preliminary radiology report from [facility].    LOCATION:  [facility]   Dictated by (CST): Eliana Chew MD on 3/27/2024 at 8:18 AM     Finalized by (CST): Eliana Chew MD on 3/27/2024 at 8:19 AM       CT BRAIN OR HEAD (19967)    Result Date: 3/27/2024  CONCLUSION:  No significant midline shift or mass effect.  No acute intracranial hemorrhage.  Moderate chronic microvascular ischemic changes are likely present within the white matter.  If there is persistent clinical concern then consider MRI.     LOCATION:  [facility]   Dictated by (CST): Matthew Urena MD on 3/27/2024 at 6:08 AM     Finalized by (CST): Matthew Urena MD on 3/27/2024 at 6:09 AM         Labs:  Lab Results   Component Value Date    WBC 9.4 03/27/2024    HGB 12.1 03/27/2024    HCT 38.0 03/27/2024    .0 03/27/2024    CREATSERUM 1.28 03/27/2024    BUN 21 03/27/2024     03/27/2024    K 4.2 03/27/2024     03/27/2024    CO2 28.0 03/27/2024     03/27/2024    CA 9.0 03/27/2024    ALB 3.2 03/27/2024    ALKPHO 69 03/27/2024    BILT 0.2 03/27/2024    TP 6.6 03/27/2024    AST 12 03/27/2024    ALT 20 03/27/2024           Assessment/Plan:  Shock, septic vs hemorrhagic  - Vasopressors to maintain MAP >65  - Resume home midodrine  - Lactic acid 4.5, trend for clearance  - Pct normal  - Order blood cultures  - TTE pending  - Zosyn (3/28- )    Right hip femur fracture POD #0 s/p Right femur intramedullary nailing  - EBL 100cc  - PT/OT, WBAT with walker  - Pain control    Acute blood loss anemia  - Reported EBL ~100cc  - No post-op labs available however labs upon arrival to the ICU with Hgb 7.5, down from 12.1 with ongoing hypotension  - Will notify ortho surgeon  - Transfuse 2u PRBCs    UTI/leukocytosis  - Final cultures pending  - Afebrile  - Pct normal  - Will give additional IVF bolus now  - Zosyn  (3/28- )    HTN  - Home meds on hold in setting of shock    Depression  - Home meds    F/E/N  - IVF  - Replete electrolytes per protocol  - NPO    Proph  - subcutaneous lovenox when ok for surgery- held for now due to hgb drop  - SCDs  - PT/OT    Dispo  - DNAR select (POLST form sent from nursing home)  - ICU monitoring    Plan of care discussed with intensivist on-call, Dr. Barrera    A total of 35 minutes of critical care time (exclusive of billable procedures) was administered. This involved direct patient intervention, complex decision making, and/or extensive discussions (>50% face to face time) with the patient, family, and clinical staff.    Yisel Cameron [facility]  Critical Care  [id number]      INTENSIVIST ADDENDUM      I agree with critical care APN note above. I have independently seen & evaluated the patient.  I agree with the management plan outlined above with the following changes/additions:      CXR 3/27      Plan  Hypotension post op. ?sepsis vs hypovolemia.   Continue with pressors as needed and IVF.   Echo pending.   Abx zosyn 3/28-  pending cx.   Possibly to floor , ortho wth tele if does not require pressors by end of today. Pulm will follow as needed out of ICU.     35 mins spent in critical care time, reviewing data, and discussion with family and treatment team.     On call Intensivist 03/28/24      My best regards,         Silvino Barrera MD  [facility] Pulmonary, Critical Care and Sleep Medicine      Electronically signed by Silvino Barrera MD on 3/28/2024 11:30 AM              Discharge Summary - D/C Summary      Discharge Summary signed by Kesha Torres DO at 4/4/2024  9:55 AM  Version 1 of 1    Author: Kesha Torres DO Service: Hospitalist Author Type: Physician    Filed: 4/4/2024  9:55 AM Date of Service: 4/4/2024  9:55 AM Status: Signed    : Kesha Torres DO (Physician)    Related Notes: Original Note by Shaunna Lozano PA (Physician Assistant) filed at 4/3/2024  3:26 PM        [facility] HOSPITALIST  DISCHARGE SUMMARY     Valeria Ramey Patient Status:  Inpatient    1934 MRN [id number]   Location [facility] 3SW-A Attending Kesha Torres, DO   Hosp Day # 7 PCP JOSIAH VASQUEZ MD     Date of Admission: 3/27/2024  Date of Discharge:   2024     Discharge Disposition: [facility]    Discharge Diagnosis:  #Right hip fx s/p Right femur intramedullary nailing 3/27   #Left sided pleuritic chest pain/chest wall discomfort possibly related to below  #T8 Acute compression fx s/p Kyphoplasty 4/3  #Prior compression fx of T10-T11, prior kyphoplasty   #Hemorrhagic Shock 2/2 blood loss, doubt septic etiology, resolved  #Acute Blood Loss Anemia -s/p PRBCs 2 units, stable  #E coli UTI, resolved  #Essential HTN   #Dementia with hospital Delirium improved  #Depression   #Orthostatic hypotension     History of Present Illness: Valeria Ramey is a 89 year old female with past medical history of hypertension, depression, polyarthritis who presents for mechanical fall.  History obtained from daughter at bedside.  Patient notes that she was in normal state of health today when she was attempting to move around at home and assisted living and got tangled around her feet.  Fell on the right side, and had injury to the hip.  Daughter notes that she has been having multiple falls when she was living at home as well.  Patient currently denies any other nausea, vomiting, headaches, vision changes, abdominal pain, chest pain    Brief Synopsis: Patient is a 89-year-old female who presented after mechanical fall.  She was found to have right hip fracture and was taken for right femur intramedullary nailing on 3/27.  She required pressor support postprocedure and PRBCs for anemia.  We continue to monitor serial hemoglobin and patient able to wean off pressor support.  She was started on antibiotics for UTI and culture growing E. coli.  No growth to date on blood cultures.  Continue on Norco as needed for  pain and continue to work with PT OT.  She reported back pain occasionally radiating around the chest and x-ray showing T8 compression fracture indeterminate age.  Follow-up MRI showing acute compression fracture at T8 and prior kyphoplasty at T10, T11.  IR consulted for kyphoplasty.  Patient developed worsening chest wall/pleuritic discomfort and CTA of the chest ruled out PE.  She was cleared to proceed with kyphoplasty and underwent procedure without event.   She completed antibiotics for UTI during the stay.  Social work assist with discharge planning for subacute rehab.  Patient discharged to [facility] and discharged once cleared by all consultants.  She was instructed to follow-up with Ortho in 2 weeks, primary care in 1 week.     Lace+ Score: 71  59-90 High Risk  29-58 Medium Risk  0-28   Low Risk       TCM Follow-Up Recommendation:  LACE > 58: High Risk of readmission after discharge from the hospital.      Procedures during hospitalization:   Date of Operation:  3/27/2024  Preoperative Diagnosis:  RIGHT INTERTROCHANTERIC FEMUR FRACTURE  Postoperative Diagnosis:  RIGHT INTERTROCHANTERIC FEMUR FRACTURE  Procedure Performed:  RIGHT FEMUR INTRAMEDULLARY NAILING  Surgeon:  KM KIRBY M.D.      4/3 IR Kyphoplasty T8  Pre-Procedure Diagnosis:  painful osteoporotic compression fracture  Post-Procedure Diagnosis: same  Anesthesia:  Local and MAC  Findings:  good cement fill anterior 2/3 T8 vertebral body, no extravasation      Consultants: pulmCC, ortho, IR         Discharge Medications        START taking these medications        Instructions Prescription details   aspirin 81 MG Tbec  Replaces: aspirin 325 MG Tabs      Take 1 tablet (81 mg total) by mouth 2 (two) times daily. Start after lovenox regiment has been completed. Take for 4 weeks.   Quantity: 56 tablet  Refills: 0     enoxaparin 40 MG/0.4ML Sosy  Commonly known as: Lovenox      Inject 0.4 mL (40 mg total) into the skin daily. Take for 2 weeks after  surgery.   Quantity: 14 each  Refills: 0     HYDROcodone-acetaminophen 5-325 MG Tabs  Commonly known as: Norco      Take 1 tablet by mouth every 4 (four) hours as needed.   Quantity: 24 tablet  Refills: 0     Polyethylene Glycol 3350 17 g Pack  Commonly known as: MIRALAX      Take 17 g by mouth daily.   Quantity: 14 each  Refills: 0     Sennosides 17.2 MG Tabs      Take 1 tablet (17.2 mg total) by mouth nightly as needed (constipation).   Quantity: 14 tablet  Refills: 0            CHANGE how you take these medications        Instructions Prescription details   dorzolamide-timolol 2-0.5 % Soln  Commonly known as: Cosopt  What changed:   when to take this  Another medication with the same name was removed. Continue taking this medication, and follow the directions you see here.      Place 1 drop into both eyes 2 (two) times daily.   Quantity: 1 each  Refills: 0            CONTINUE taking these medications        Instructions Prescription details   atorvastatin 10 MG Tabs  Commonly known as: Lipitor      Take 1 tablet (10 mg total) by mouth daily.   Refills: 0     CALCIUM 600-D OR      Take by mouth 2 (two) times daily.   Refills: 0     citalopram 20 MG Tabs  Commonly known as: CeleXA      Take 1 tablet (20 mg total) by mouth daily.   Refills: 0     CRANBERRY EXTRACT OR      Take 1 capsule by mouth 2 (two) times daily.   Refills: 0     donepezil 5 MG Tabs  Commonly known as: Aricept      Take 1 tablet (5 mg total) by mouth at bedtime.   Refills: 0     latanoprost 0.005 % Soln  Commonly known as: Xalatan      Place 1 drop into both eyes daily.   Refills: 0     Loratadine 10 MG Caps      Take 10 mg by mouth daily as needed.   Refills: 0     melatonin 1 MG Tabs      Take 1.5 tablets (1.5 mg total) by mouth daily.   Refills: 0     midodrine 5 MG Tabs  Commonly known as: ProAmatine      Take 1 tablet (5 mg total) by mouth daily as needed (If systolic Blood pressure less than 120).   Refills: 0            STOP taking these  medications      ALPRAZolam 0.25 MG Tabs  Commonly known as: Xanax        aspirin 325 MG Tabs  Replaced by: aspirin 81 MG Tbec        carvedilol 3.125 MG Tabs  Commonly known as: Coreg                  Where to Get Your Medications        These medications were sent to [facility] [address] [place], [place] [address] [facility] [phone], [phone]  [address] [place] [address]      Phone: [phone]   aspirin 81 MG Tbec  dorzolamide-timolol 2-0.5 % Soln       Please  your prescriptions at the location directed by your doctor or nurse    Bring a paper prescription for each of these medications  enoxaparin 40 MG/0.4ML Sosy  HYDROcodone-acetaminophen 5-325 MG Tabs  Polyethylene Glycol 3350 17 g Pack  Sennosides 17.2 MG Tabs         [place]    Follow-up appointment:   Jimmy Brown MD  [address]  [address]  [facility] [id number] [phone]    Schedule an appointment as soon as possible for a visit in 1 week(s)      Ruben Marley MD  [address]  [address]  [facility] [id number] [phone]    Schedule an appointment as soon as possible for a visit in 2 week(s)      Appointments for Next 30 Days 4/3/2024 - 5/3/2024        Date Arrival Time Visit Type Length Department Provider     4/3/2024  2:00 PM  [facility] IVS IP IR NEURO SEDATE [6024] 60 min [facility] IVS IR     4/3/2024  2:00 PM  [facility] IVS IP IR NEURO SEDATE [6024] 60 min [facility] Taj Wooten MD    Patient Instructions:     When you arrive, park in the [facility], then proceed to the ground floor of the [facility] lob and check in at the [facility] registration desk so we know you have arrived (even if you already completed eCheck-in online).     Your physician or physician's representative will follow-up with you and your loved one by phone after you are discharged.    A nurse from the Interventional Suites Department will be calling you prior to your  procedure to perform a health history screening and give you instructions.  The time the nurse assigns you to arrive will be one hour prior to your procedure.  This will allow plenty of time to register and to get you prepared for your procedure.     If you have any questions, please call [phone].  We are available Monday through Friday, 8:00 AM to 5:00 PM.      Location Instructions:     Your appointment is scheduled in the Interventional Suites Department at [facility].&nbsp; The address is [address] [place] [address].&nbsp; To reach Registration, park in the [facility] and enter the doors located on the ground floor.&nbsp; Proceed to Registration, located across from the [facility], to the left of the Information Desk.  A nurse from Interventional Radiology will be calling you the day prior to your procedure with your arrival time.  Masks are optional for all patients and visitors, unless otherwise indicated.                      Vital signs:  Temp:  [97.9 °F (36.6 °C)-98.2 °F (36.8 °C)] 97.9 °F (36.6 °C)  Pulse:  [80-94] 94  Resp:  [15-25] 17  BP: (114-149)/(44-75) 142/75  SpO2:  [92 %-95 %] 95 %    Physical Exam:    General: No acute distress 89 year old female alert and oriented to self/persons, answering questions appropriately  Lungs: clear to auscultation   Cardiovascular: S1, S2 RRR  Abdomen: Soft, nondistended  Extremities: R hip incision w/ dressing    -----------------------------------------------------------------------------------------------  PATIENT DISCHARGE INSTRUCTIONS: See electronic chart    ERIN Herron  3:25 PM     Addendum:    Agree with above note.  Pt. Seen and examined.    Gen: NAD  CVS: s1s2  Resp: CTA  Abd: soft    POC: Discharge to [facility]  Discharge time spent 32 minutes       Pt seen an examined independently. Chart reviewed. Labs and imaging over the last 24 hours have been personally reviewed.  I personally made/approved 100% of the management  plan for this patient and take full responsibility for the patient management.   Note has been reviewed by me and modified as needed.  Exam and Impression/ Recs as noted above.  D/w staff.    Kesha Torres DO              The 21st Century Cures Act makes medical notes like these available to patients in the interest of transparency. Please be advised this is a medical document. Medical documents are intended to carry relevant information, facts as evident, and the clinical opinion of the practitioner. The medical note is intended as peer to peer communication and may appear blunt or direct. It is written in medical language and may contain abbreviations or verbiage that are unfamiliar.       Electronically signed by Kesha Torres DO on 4/4/2024  9:55 AM              Physical Therapy Notes (last 72 hours)      Physical Therapy Note signed by Tara Nance PTA at 4/3/2024 11:04 AM  Version 1 of 1    Author: Tara Nance PTA Service: Rehab Author Type: Physical Therapy Assistant    Filed: 4/3/2024 11:04 AM Date of Service: 4/3/2024 11:04 AM Status: Signed    : Tara Nance PTA (Physical Therapy Assistant)       Following for PT treatment. Pt with confirmed acute T8 superior endplate compression fx and plan for kypho this afternoon at 1400. Spoke with RN this morning who reported Pt experiencing back pain and wishes to defer another therapy session until after kypho. Will f/u tomorrow.        Physical Therapy Note signed by Wilda Gonzalez PT at 4/1/2024 11:00 AM  Version 1 of 1    Author: Wilda Gonzalez PT Service: Rehab Author Type: Physical Therapist    Filed: 4/1/2024 11:00 AM Date of Service: 4/1/2024 10:51 AM Status: Signed    : Wilda Gonzalez PT (Physical Therapist)          PHYSICAL THERAPY TREATMENT NOTE - INPATIENT    Room Number: 386/386-A     Session: 2/10     Number of Visits to Meet Established Goals: 10    Presenting Problem: R femur fracture  Co-Morbidities :  dementia    PHYSICAL THERAPY MEDICAL/SOCIAL HISTORY  History related to current admission: Patient is a 89 year old female admitted on 3/27/2024 from home for fall.  Pt diagnosed with R femur fracture, s/p IM nailing 3/27/24.     HOME SITUATION  Type of Home: Assisted living facility   Home Layout: One level     Lives With: Alone     Prior Level of [place]: Pt reports ambulating to dining room for meals with RW.  Pt is able to get self out of bed in the morning.     ASSESSMENT   Patient demonstrates fair progress this session, goals  remain in progress.    Patient continues to function below baseline with bed mobility, transfers, gait, maintaining seated position, and standing prolonged periods.  Contributing factors to remaining limitations include decreased functional strength, decreased endurance/aerobic capacity, pain, impaired static and dynamic standing balance, impaired coordination, impaired motor planning, decreased muscular endurance, and limited RLE ROM.  Next session anticipate patient to progress bed mobility, transfers, and gait.  Physical Therapy will continue to follow patient for duration of hospitalization.    Patient continues to benefit from continued skilled PT services: to promote return to prior level of function and safety with continuous assistance and gradual rehabilitative therapy .    PLAN  PT Treatment Plan: Bed mobility;Body mechanics;Coordination;Endurance;Energy conservation;Patient education;Family education;Gait training;Neuromuscular re-educate;Range of motion;Strengthening;Stoop training;Transfer training;Balance training;Stair training  Rehab Potential : Good  Frequency (Obs): 3-5x/week    CURRENT GOALS      Goal #1 Patient is able to demonstrate supine - sit EOB @ level: supervision      Goal #2 Patient is able to demonstrate transfers Sit to/from Stand at assistance level: supervision      Goal #3 Patient is able to ambulate 50 feet with assist device: walker - rolling at  assistance level: supervision      Goal #4     Goal #5     Goal #6     Goal Comments: Goals established on 3/28/2024  4/1/2024 all goals ongoing    SUBJECTIVE  \"Scratch my back!\"    OBJECTIVE  Precautions: Bed/chair alarm    WEIGHT BEARING RESTRICTION  Weight Bearing Restriction: R lower extremity        R Lower Extremity: Weight Bearing as Tolerated       PAIN ASSESSMENT   Rating: Unable to rate  Location: back and right thigh/hip  Management Techniques: Activity promotion;Body mechanics;Repositioning    BALANCE                                                                                                                       Static Sitting: Fair  Dynamic Sitting: Fair -           Static Standing: Poor  Dynamic Standing: Poor -    ACTIVITY TOLERANCE                         O2 WALK         AM-PAC '6-Clicks' INPATIENT SHORT FORM - BASIC MOBILITY  How much difficulty does the patient currently have...  Patient Difficulty: Turning over in bed (including adjusting bedclothes, sheets and blankets)?: A Lot   Patient Difficulty: Sitting down on and standing up from a chair with arms (e.g., wheelchair, bedside commode, etc.): A Lot   Patient Difficulty: Moving from lying on back to sitting on the side of the bed?: A Lot   How much help from another person does the patient currently need...   Help from Another: Moving to and from a bed to a chair (including a wheelchair)?: Total   Help from Another: Need to walk in hospital room?: Total   Help from Another: Climbing 3-5 steps with a railing?: Total       AM-PAC Score:  Raw Score: 9   Approx Degree of Impairment: 81.38%   Standardized Score (AM-PAC Scale): 30.55   CMS Modifier (G-Code): CM    FUNCTIONAL ABILITY STATUS  Gait Assessment   Functional Mobility/Gait Assessment  Gait Assistance: Not tested  Distance (ft): 12 inches  Assistive Device: Rolling walker  Pattern: R Foot drag;Shuffle (unable to weight shift to R side to advance LLE)    Skilled Therapy Provided    Bed  Mobility:  Rolling: NT   Supine<>Sit: w/ HOB significantly elevated- maxA x  2 to reach sitting EOB, unable to lift RLE to help assist transfer off bed   Sit<>Supine: NT     Transfer Mobility:  Sit<>Stand: w/ height of bed elevated modA x 2 to RW- rocking bkwds/fwds for momentum    Stand<>Sit: modA x 2    Gait: NT    Therapist's Comments:   Patient presents resting in bed. Two daughters present in room. Family reports she didn't get much sleep last night. Pt reports pain in back at rest, noticeable pain behaviors noted with movement of RLE. Educated on log roll technique for protection of spine, but due to RLE pain and inability to move on her own head of bed significantly elevated. MaxA x 2 to reach static sitting EOB, maxA to scoot forward EOB as well. Pt unable to assist moving RLE off bed during transfer. Once sitting EOB, SBA for static sitting balance. Elevated height of bed a little bit for ease of sit to stand transfers. Sit to stand transfer x 2 trials- modA x 2 to RW. On second attempt, a few lateral steps to left side at maxA, significant difficulty offloading to advance LE for lateral stepping. Unable to fully reach bedside chair, maxA x 2 for quick pivot into the chair. Up in chair at end of session. Reporting nausea, BP taken 150/71. Pt intermittently drowsy throughout session and closing her eyes. Daughters asking her questions throughout with inconsistent responses.     Patient End of Session: Up in chair;Needs met;Call light within reach;RN aware of session/findings;All patient questions and concerns addressed;SCDs in place;Alarm set;Family present;Discussed recommendations with /    PT Session Time: 27 minutes  Therapeutic Activity: 25 minutes                Occupational Therapy Notes (last 72 hours)      Occupational Therapy Note signed by Ene Wade OT at 4/1/2024 11:25 AM  Version 1 of 1    Author: Ene Wade OT Service: Rehab Author Type: Occupational  Therapist    Filed: 4/1/2024 11:25 AM Date of Service: 4/1/2024 10:50 AM Status: Signed    : Ene Wade OT (Occupational Therapist)       OCCUPATIONAL THERAPY TREATMENT NOTE - INPATIENT     Room Number: 386/386-A  Session: 1   Number of Visits to Meet Established Goals: 5    Presenting Problem: 3/27 R femur IM nailing      ASSESSMENT   Patient demonstrates limited progress this session, goals remain in progress.    Patient continues to function below baseline with toileting, lower body dressing, bed mobility, transfers, static standing balance, dynamic standing balance, and functional standing tolerance.   Contributing factors to remaining limitations include decreased functional reach, decreased endurance, pain, and impaired standing balance.  Next session anticipate patient to progress toileting, lower body dressing, bed mobility, transfers, and dynamic standing balance.  Occupational Therapy will continue to follow patient for duration of hospitalization.    Patient continues to benefit from continued skilled OT services: to promote return to prior level of function and safety with continuous assistance and gradual rehabilitative therapy .             History Related to Current Admission: Patient is a 89 year old female admitted on 3/27/2024 with Presenting Problem: 3/27 R femur IM nailing. Co-Morbidities : dementia    HOME SITUATION  Type of Home: Assisted living facility  Home Layout: One level  Lives With: Alone    Toilet and Equipment: Comfort height toilet  Shower/Tub and Equipment: Walk-in shower     Occupation/Status: retired  Hand Dominance: Right     Prior Level of Function: Prior to admission, patient's baseline is Mod I for all ADLs except for min A for showering.    WEIGHT BEARING RESTRICTION  Weight Bearing Restriction: R lower extremity        R Lower Extremity: Weight Bearing as Tolerated       Recommendations for nursing staff:   Transfers: sera-steady  Toileting location:  commode    TREATMENT SESSION:  Patient Start of Session: supine  FUNCTIONAL TRANSFER ASSESSMENT  Sit to Stand: Edge of Bed  Edge of Bed: Dependent (mod x2 with RW)    BED MOBILITY  Rolling: Not Tested  Supine to Sit : Dependent (max x2, HOB elevated)  Scooting: not tested    BALANCE ASSESSMENT  Static Sitting: Supervision  Sitting Bilateral: Contact Guard Assist  Static Standing: Moderate Assist  Standing Bilateral: Dependent    FUNCTIONAL ADL ASSESSMENT  Eating: Modified Independent  Grooming Seated: Not Tested  LB Dressing Seated: Dependent (total for sock management)      ACTIVITY TOLERANCE:                          O2 SATURATIONS  Oxygen Therapy  SPO2% on Room Air at Rest: 92  SPO2% on Oxygen at Rest: 97  At rest oxygen flow (liters per minute): 2    EDUCATION PROVIDED  Patient: Role of Occupational Therapy; Plan of Care; Functional Transfer Techniques; Fall Prevention; Weight Bear Status; Compensatory ADL Techniques  Patient's Response to Education: Verbalized Understanding; Requires Further Education (two daughters also at bedside throughout)      Equipment used: RW  Demonstrates functional use, Would benefit from additional trial      Therapist comments: Patient participatory with encouragement due to fatigue, pain increasing with movement both in R hip and back; educated on spine precautions for comfort due to back pain (awaiting imaging, per RN cleared for therapy in meantime); patient tolerated bed mobility, standing with RW and mod assist x2; initiated side-steps towards HOB and recliner on L side mod assist x2 with RW, but ultimately required max x2 to complete pivot transfer remainder of way to chair in preparation for commode transfer. Supportive daughters at bedside throughout. Will continue to follow.   Patient End of Session: Up in chair;Needs met;Call light within reach;RN aware of session/findings;All patient questions and concerns addressed;SCDs in place;Alarm set;Family  present    SUBJECTIVE  \"It hurts all over\"    PAIN ASSESSMENT  Rating: Unable to rate  Location: R hip, back, \"all over\"  Management Techniques: Activity promotion;Body mechanics;Breathing techniques;Relaxation;Repositioning     OBJECTIVE  Precautions: Bed/chair alarm    AM-PAC ‘6-Clicks’ Inpatient Daily Activity Short Form  -   Putting on and taking off regular lower body clothing?: Total  -   Bathing (including washing, rinsing, drying)?: A Lot  -   Toileting, which includes using toilet, bedpan or urinal? : Total  -   Putting on and taking off regular upper body clothing?: A Little  -   Taking care of personal grooming such as brushing teeth?: A Little  -   Eating meals?: A Little (setup)    AM-PAC Score:  Score: 13  Approx Degree of Impairment: 63.03%  Standardized Score (AM-PAC Scale): 32.03    PLAN  OT Treatment Plan: Balance activities;Energy conservation/work simplification techniques;IADL training;ADL training;Continued evaluation;Compensatory technique education;Equipment eval/education;Patient/Family training;Patient/Family education;Endurance training;UE strengthening/ROM;Functional transfer training  Rehab Potential : Good  Frequency: 3-5x/week    Goals in progress 4/1/24  ADL Goals   Patient will perform grooming: with setup  Patient will perform upper body dressing:  with supervision  Patient will perform lower body dressing:  with mod assist    Functional Transfer Goals  Patient will transfer from supine to sit:  with supervision  Patient will transfer from sit to stand:  with mod assist    OT Session Time: 25 minutes  Therapeutic Activity: 25 minutes               Video Swallow Study Notes    No notes of this type exist for this encounter.     SLP Notes    No notes of this type exist for this encounter.     Immunizations     Name Date      INFLUENZA 10/12/20       Multidisciplinary Problems     Active Goals        Problem: Patient/Family Goals    Goal Priority Disciplines Outcome Interventions    Patient/Family Long Term Goal     Interdisciplinary Progressing    Description: Patient's Long Term Goal: ambulate TID, perform ADLs    Interventions:  - work with pt/ot  - pain management  - See additional Care Plan goals for specific interventions   Patient/Family Short Term Goal     Interdisciplinary Progressing    Description: Patient's Short Term Goal: pain management    Interventions:   - pharm and nonpharm interventions  - See additional Care Plan goals for specific interventions

## (undated) NOTE — LETTER
[facility] 4SW-A  [address] [address]  [phone]    Blood Transfusion Consent    In the course of your treatment, it may become necessary to administer a transfusion of blood or blood components. This form provides basic information concerning this procedure and, if signed by you, authorizes its administration. By signing this form, you agree that all of your questions about the administration of blood or blood products have been answered by the ordering medical professional or designee.    Description of Procedure  Blood is introduced into one of your veins, commonly in the arm, using a sterilized disposable needle. The amount of blood transfused, and whether the transfusion will be of blood or blood components is a judgement the physician will make based on your particular needs.    Risks  The transfusion is a common procedure of low risk.  MINOR AND TEMPORARY REACTIONS ARE NOT UNCOMMON, including a slight bruise, swelling or local reaction in the area where the needle pierces your skin, or a nonserious reaction to the transfused material itself, including headache, fever or mild skin reaction, such as rash.  Serious reactions are possible, though very unlikely, and include severe allergic reaction (shock) and destruction (hemolysis) of transfused blood cells.  Infectious diseases which are known to be transmitted by blood transfusion include certain types of viral Hepatitis(liver infection from a virus), Human Immunodeficiency Virus (HIV-1,2) infection, a viral infection known to cause Acquired Immunodeficiency Syndrome (AIDS), as well as certain other bacterial, viral, and parasitic diseases. While a minimal risk of acquiring an infectious disease from transfused blood exists, in accordance with the Federal and State law, all due care has been taken in donor selection and testing to avoid transmission of disease.    Alternatives  If loss of blood poses serious threats during your  treatment, THERE IS NO EFFECTIVE ALTERNATIVE TO BLOOD TRANSFUSION. However, if you have any further questions on this matter, your provider will fully explain the alternatives to you if it has not already been done.    I, ______________________________, have read/had read to me the above. I understand the matters bearing on the decision whether or not to authorize a transfusion of blood or blood components. I have no questions which have not been answered to my full satisfaction. I hereby consent to such transfusion as my physician may deem necessary or advisable in the course of my treatment.    ______________________________________________                    ___________________________  (Signature of Patient or Responsible party in case of minor,                 (Printed Name of Patient or incompetent, or unconscious patient)              Responsible Party)    ___________________________               _____________________  (Relationship to Patient if not self)                                    (Date and Time)    __________________________                                                           ______________________              (Signature of Witness)               (Printed Name of Witness)     Language line ()    Telephone/Verbal/Video Consent    __________________________                     ____________________  (Signature of 2nd Witness           (Printed Name of 2nd  Telephone/Verbal/Video Consent)           Witness)    Patient Name: Valeria Ramey     : 1934                 Printed: 2024     Medical Record #: [id number]      Rev: 2023

## (undated) NOTE — LETTER
[address]  [address]  Authorization for Surgical Operation and Procedure     Date:___________                                                                                                         Time:__________  I hereby authorize Surgeon(s):  Ruben Marley MD, my physician and his/her assistants (if applicable), which may include medical students, residents, and/or fellows, to perform the following surgical operation/ procedure and administer such anesthesia as may be determined necessary by my physician:  Operation/Procedure name (s) Procedure(s):  RIGHT HIP/FEMUR INTRAMEDULLARY NAIL on Valeria Ramey   2.   I recognize that during the surgical operation/procedure, unforeseen conditions may necessitate additional or different procedures than those listed above.  I, therefore, further authorize and request that the above-named surgeon, assistants, or designees perform such procedures as are, in their judgment, necessary and desirable.    3.   My surgeon/physician has discussed prior to my surgery the potential benefits, risks and side effects of this procedure; the likelihood of achieving goals; and potential problems that might occur during recuperation.  They also discussed reasonable alternatives to the procedure, including risks, benefits, and side effects related to the alternatives and risks related to not receiving this procedure.  I have had all my questions answered and I acknowledge that no guarantee has been made as to the result that may be obtained.    4.   Should the need arise during my operation/procedure, which includes change of level of care prior to discharge, I also consent to the administration of blood and/or blood products.  Further, I understand that despite careful testing and screening of blood or blood products by collecting agencies, I may still be subject to ill effects as a result of receiving a blood transfusion and/or blood products.   The following are some, but not all, of the potential risks that can occur: fever and allergic reactions, hemolytic reactions, transmission of diseases such as Hepatitis, AIDS and Cytomegalovirus (CMV) and fluid overload.  In the event that I wish to have an autologous transfusion of my own blood, or a directed donor transfusion, I will discuss this with my physician.  Check only if Refusing Blood or Blood Products  I understand refusal of blood or blood products as deemed necessary by my physician may have serious consequences to my condition to include possible death. I hereby assume responsibility for my refusal and release the hospital, its personnel, and my physicians from any responsibility for the consequences of my refusal.          o  Refuse      5.   I authorize the use of any specimen, organs, tissues, body parts or foreign objects that may be removed from my body during the operation/procedure for diagnosis, research or teaching purposes and their subsequent disposal by hospital authorities.  I also authorize the release of specimen test results and/or written reports to my treating physician on the hospital medical staff or other referring or consulting physicians involved in my care, at the discretion of the Pathologist or my treating physician.    6.   I consent to the photographing or videotaping of the operations or procedures to be performed, including appropriate portions of my body for medical, scientific, or educational purposes, provided my identity is not revealed by the pictures or by descriptive texts accompanying them.  If the procedure has been photographed/videotaped, the surgeon will obtain the original picture, image, videotape or CD.  The hospital will not be responsible for storage, release or maintenance of the picture, image, tape or CD.    7.   I consent to the presence of a  or observers in the operating room as deemed necessary by my physician or their  designees.    8.   I recognize that in the event my procedure results in extended X-Ray/fluoroscopy time, I may develop a skin reaction.    9. If I have a Do Not Attempt Resuscitation (DNAR) order in place, that status will be suspended while in the operating room, procedural suite, and during the recovery period unless otherwise explicitly stated by me (or a person authorized to consent on my behalf). The surgeon or my attending physician will determine when the applicable recovery period ends for purposes of reinstating the DNAR order.  10. Patients having a sterilization procedure: I understand that if the procedure is successful the results will be permanent and it will therefore be impossible for me to inseminate, conceive, or bear children.  I also understand that the procedure is intended to result in sterility, although the result has not been guaranteed.   11. I acknowledge that my physician has explained sedation/analgesia administration to me including the risk and benefits I consent to the administration of sedation/analgesia as may be necessary or desirable in the judgment of my physician.    I CERTIFY THAT I HAVE READ AND FULLY UNDERSTAND THE ABOVE CONSENT TO OPERATION and/or OTHER PROCEDURE.    _________________________________________  __________________________________  Signature of Patient     Signature of Responsible Person         ___________________________________         Printed Name of Responsible Person           _________________________________                 Relationship to Patient  _________________________________________  ______________________________  Signature of Witness          Date  Time      Patient Name: Valeria Ramey     : 1934                 Printed: 2024     Medical Record #: [id number]                     Page 1 of [address] [place] [address]  [id number]    Consent for Anesthesia    Valeria SNYDER  Tanvir agree to be cared for by an anesthesiologist, who is specially trained to monitor me and give me medicine to put me to sleep or keep me comfortable during my procedure    I understand that my anesthesiologist is not an employee or agent of [facility] [employer]. He or she works for [employer].    As the patient asking for anesthesia services, I agree to:  Allow the anesthesiologist (anesthesia doctor) to give me medicine and do additional procedures as necessary. Some examples are: Starting or using an “IV” to give me medicine, fluids or blood during my procedure, and having a breathing tube placed to help me breathe when I’m asleep (intubation). In the event that my heart stops working properly, I understand that my anesthesiologist will make every effort to sustain my life, unless otherwise directed by [facility] Do Not Resuscitate documents.  Tell my anesthesia doctor before my procedure:  If I am pregnant.  The last time that I ate or drank.  All of the medicines I take (including prescriptions, herbal supplements, and pills I can buy without a prescription (including street drugs/illegal medications). Failure to inform my anesthesiologist about these medicines may increase my risk of anesthetic complications.  If I am allergic to anything or have had a reaction to anesthesia before.  I understand how the anesthesia medicine will help me (benefits).  I understand that with any type of anesthesia medicine there are risks:  The most common risks are: nausea, vomiting, sore throat, muscle soreness, damage to my eyes, mouth, or teeth (from breathing tube placement).  Rare risks include: remembering what happened during my procedure, allergic reactions to medications, injury to my airway, heart, lungs, vision, nerves, or muscles and in extremely rare instances death.  My doctor has explained to me other choices available to me for my care (alternatives).  Pregnant  Patients (“epidural”):  I understand that the risks of having an epidural (medicine given into my back to help control pain during labor), include itching, low blood pressure, difficulty urinating, headache or slowing of the baby’s heart. Very rare risks include infection, bleeding, seizure, irregular heart rhythms and nerve injury.  Regional Anesthesia (“spinal”, “epidural”, & “nerve blocks”):  I understand that rare but potential complications include headache, bleeding, infection, seizure, irregular heart rhythms, and nerve injury.    I can change my mind about having anesthesia services at any time before I get the medicine.    _____________________________________________________________________________  Patient (or Representative) Signature/Relationship to Patient  Date   Time    _____________________________________________________________________________   Name (if used)    Language/Organization   Time    _____________________________________________________________________________  Anesthesiologist Signature     Date   Time  I have discussed the procedure and information above with the patient (or patient’s representative) and answered their questions. The patient or their representative has agreed to have anesthesia services.    _____________________________________________________________________________  Witness        Date   Time  I have verified that the signature is that of the patient or patient’s representative, and that it was signed before the procedure  Patient Name: Valeria Ramey     : 1934                 Printed: 2024     Medical Record #: [id number]                     Page 2 of 2

## (undated) NOTE — LETTER
[address]  [address]    Consent for Anesthesia    IValeria agree to be cared for by a physician anesthesiologist alone and/or with a nurse anesthetist, who is specially trained to monitor me and give me medicine to put me to sleep or keep me comfortable during my procedure    I understand that my anesthesiologist and/or anesthetist is not an employee or agent of [facility] or [employer]. He or she works for [employer].    As the patient asking for anesthesia services, I agree to:  Allow the anesthesiologist (anesthesia doctor) to give me medicine and do additional procedures as necessary. Some examples are: Starting or using an “IV” to give me medicine, fluids or blood during my procedure, and having a breathing tube placed to help me breathe when I’m asleep (intubation). In the event that my heart stops working properly, I understand that my anesthesiologist will make every effort to sustain my life, unless otherwise directed by [facility] Do Not Resuscitate documents.  Tell my anesthesia doctor before my procedure:   If I am pregnant.   The last time that I ate or drank.  iii. All of the medicines I take (including prescriptions, herbal supplements, and pills I can buy without a prescription (including street drugs/illegal medications). Failure to inform my anesthesiologist about these medicines may increase my risk of anesthetic complications.  Iv.If I am allergic to anything or have had a reaction to anesthesia before.  I understand how the anesthesia medicine will help me (benefits).  I understand that with any type of anesthesia medicine there are risks:  The most common risks are: nausea, vomiting, sore throat, muscle soreness, damage to my eyes, mouth, or teeth (from breathing tube placement).  Rare risks include: remembering what happened during my procedure, allergic reactions to medications, injury to my airway,  heart, lungs, vision, nerves, or muscles and in extremely rare instances death.  My doctor has explained to me other choices available to me for my care (alternatives).  Pregnant Patients (“epidural”):  I understand that the risks of having an epidural (medicine given into my back to help control pain during labor), include itching, low blood pressure, difficulty urinating, headache or slowing of the baby’s heart. Very rare risks include infection, bleeding, seizure, irregular heart rhythms and nerve injury.  Regional Anesthesia (“spinal”, “epidural”, & “nerve blocks”):  I understand that rare but potential complications include headache, bleeding, infection, seizure, irregular heart rhythms, and nerve injury.    _____________________________________________________________________________  Patient (or Representative) Signature/Relationship to Patient  Date   Time    _____________________________________________________________________________   Name (if used)    Language/Organization   Time    _____________________________________________________________________________  Nurse Anesthetist Signature     Date   Time    ______________________________________________________________________________  Anesthesiologist Signature     Date   Time  I have discussed the procedure and information above with the patient (or patient’s representative) and answered their questions. The patient or their representative has agreed to have anesthesia services.    _____________________________________________________________________________  Witness       Date   Time  I have verified that the signature is that of the patient or patient’s representative, and that it was signed before the procedure    Patient Name: Valeria Ramey     : 1934                 Printed: 4/3/2024 at 1:54 PM    Medical Record #: [id number]                                            Page 1 of 1

## (undated) NOTE — LETTER
[address]  [address]  Consent for Procedure/Sedation  Date: 4/3/24         Time: 1330    I hereby authorize Dr Cedillo, my physician and his/her assistants (if applicable), which may include medical students, residents, and/or fellows, to perform the following surgical operation/ procedure and administer such anesthesia as may be determined necessary by my physician: Kyphoplasty on Valeria Fontanasette  2.   I recognize that during the surgical operation/procedure, unforeseen conditions may necessitate additional or different procedures than those listed above.  I, therefore, further authorize and request that the above-named surgeon, assistants, or designees perform such procedures as are, in their judgment, necessary and desirable.    3.   My surgeon/physician has discussed prior to my surgery the potential benefits, risks and side effects of this procedure; the likelihood of achieving goals; and potential problems that might occur during recuperation.  They also discussed reasonable alternatives to the procedure, including risks, benefits, and side effects related to the alternatives and risks related to not receiving this procedure.  I have had all my questions answered and I acknowledge that no guarantee has been made as to the result that may be obtained.    4.   Should the need arise during my operation/procedure, which includes change of level of care prior to discharge, I also consent to the administration of blood and/or blood products.  Further, I understand that despite careful testing and screening of blood or blood products by collecting agencies, I may still be subject to ill effects as a result of receiving a blood transfusion and/or blood products.  The following are some, but not all, of the potential risks that can occur: fever and allergic reactions, hemolytic reactions, transmission of diseases such as Hepatitis, AIDS and Cytomegalovirus (CMV) and fluid  overload.  In the event that I wish to have an autologous transfusion of my own blood, or a directed donor transfusion, I will discuss this with my physician.   Check only if Refusing Blood or Blood Products  I understand refusal of blood or blood products as deemed necessary by my physician may have serious consequences to my condition to include possible death. I hereby assume responsibility for my refusal and release the hospital, its personnel, and my physicians from any responsibility for the consequences of my refusal.         o  Refuse         5.   I authorize the use of any specimen, organs, tissues, body parts or foreign objects that may be removed from my body during the operation/procedure for diagnosis, research or teaching purposes and their subsequent disposal by hospital authorities.  I also authorize the release of specimen test results and/or written reports to my treating physician on the hospital medical staff or other referring or consulting physicians involved in my care, at the discretion of the Pathologist or my treating physician.    6.   I consent to the photographing or videotaping of the operations or procedures to be performed, including appropriate portions of my body for medical, scientific, or educational purposes, provided my identity is not revealed by the pictures or by descriptive texts accompanying them.  If the procedure has been photographed/videotaped, the surgeon will obtain the original picture, image, videotape or CD.  The hospital will not be responsible for storage, release or maintenance of the picture, image, tape or CD.    7.   I consent to the presence of a  or observers in the operating room as deemed necessary by my physician or their designees.    8.   I recognize that in the event my procedure results in extended X-Ray/fluoroscopy time, I may develop a skin reaction.    9. If I have a Do Not Attempt Resuscitation (DNAR) order in place, that status  will be suspended while in the operating room, procedural suite, and during the recovery period unless otherwise explicitly stated by me (or a person authorized to consent on my behalf). The surgeon or my attending physician will determine when the applicable recovery period ends for purposes of reinstating the DNAR order.  10. Patients having a sterilization procedure: I understand that if the procedure is successful the results will be permanent and it will therefore be impossible for me to inseminate, conceive, or bear children.  I also understand that the procedure is intended to result in sterility, although the result has not been guaranteed.   11. I acknowledge that my physician has explained sedation/analgesia administration to me including the risk and benefits I consent to the administration of sedation/analgesia as may be necessary or desirable in the judgment of my physician.    I CERTIFY THAT I HAVE READ AND FULLY UNDERSTAND THE ABOVE CONSENT TO OPERATION and/or OTHER PROCEDURE.        ____________________________________       _________________________________      ______________________________  Signature of Patient         Signature of Responsible Person        Printed Name of Responsible Person        ____________________________________      _________________________________      ______________________________       Signature of Witness          Relationship to Patient                       Date                                       Time  Patient Name: Valeria FERRERA Tanvir  : 1934    Reviewed: 2024   Printed: April 3, 2024  Medical Record #: [id number] Page 1 of 1